# Patient Record
Sex: FEMALE | Race: OTHER | HISPANIC OR LATINO | Employment: FULL TIME | URBAN - METROPOLITAN AREA
[De-identification: names, ages, dates, MRNs, and addresses within clinical notes are randomized per-mention and may not be internally consistent; named-entity substitution may affect disease eponyms.]

---

## 2017-03-02 ENCOUNTER — HISTORICAL (OUTPATIENT)
Dept: ADMINISTRATIVE | Facility: HOSPITAL | Age: 37
End: 2017-03-02

## 2017-03-15 ENCOUNTER — HISTORICAL (OUTPATIENT)
Dept: RADIOLOGY | Facility: HOSPITAL | Age: 37
End: 2017-03-15

## 2017-09-11 ENCOUNTER — HISTORICAL (OUTPATIENT)
Dept: ADMINISTRATIVE | Facility: HOSPITAL | Age: 37
End: 2017-09-11

## 2018-04-27 ENCOUNTER — HISTORICAL (OUTPATIENT)
Dept: ADMINISTRATIVE | Facility: HOSPITAL | Age: 38
End: 2018-04-27

## 2018-04-27 LAB
CHOLEST SERPL-MCNC: 133 MG/DL (ref 0–200)
CHOLEST/HDLC SERPL: 2.5 {RATIO} (ref 0–4)
HDLC SERPL-MCNC: 53 MG/DL (ref 35–60)
LDLC SERPL CALC-MCNC: 44 MG/DL (ref 0–129)
TRIGL SERPL-MCNC: 178 MG/DL (ref 30–150)
VLDLC SERPL CALC-MCNC: 36 MG/DL

## 2018-06-22 ENCOUNTER — HISTORICAL (OUTPATIENT)
Dept: ADMINISTRATIVE | Facility: HOSPITAL | Age: 38
End: 2018-06-22

## 2018-06-22 LAB
ABS NEUT (OLG): 5.45 X10(3)/MCL (ref 2.1–9.2)
ALBUMIN SERPL-MCNC: 3.4 GM/DL (ref 3.4–5)
ALBUMIN/GLOB SERPL: 1 RATIO (ref 1.1–2)
ALP SERPL-CCNC: 75 UNIT/L (ref 38–126)
ALT SERPL-CCNC: 26 UNIT/L (ref 12–78)
AST SERPL-CCNC: 15 UNIT/L (ref 15–37)
BASOPHILS # BLD AUTO: 0 X10(3)/MCL (ref 0–0.2)
BASOPHILS NFR BLD AUTO: 0 %
BILIRUB SERPL-MCNC: 0.3 MG/DL (ref 0.2–1)
BILIRUBIN DIRECT+TOT PNL SERPL-MCNC: 0.1 MG/DL (ref 0–0.5)
BILIRUBIN DIRECT+TOT PNL SERPL-MCNC: 0.2 MG/DL (ref 0–0.8)
BUN SERPL-MCNC: 10 MG/DL (ref 7–18)
CALCIUM SERPL-MCNC: 8.7 MG/DL (ref 8.5–10.1)
CHLORIDE SERPL-SCNC: 105 MMOL/L (ref 98–107)
CO2 SERPL-SCNC: 28 MMOL/L (ref 21–32)
CREAT SERPL-MCNC: 0.88 MG/DL (ref 0.55–1.02)
EOSINOPHIL # BLD AUTO: 0.2 X10(3)/MCL (ref 0–0.9)
EOSINOPHIL NFR BLD AUTO: 2 %
ERYTHROCYTE [DISTWIDTH] IN BLOOD BY AUTOMATED COUNT: 12.5 % (ref 11.5–17)
GLOBULIN SER-MCNC: 3.5 GM/DL (ref 2.4–3.5)
GLUCOSE SERPL-MCNC: 135 MG/DL (ref 74–106)
HCT VFR BLD AUTO: 42.2 % (ref 37–47)
HGB BLD-MCNC: 14.2 GM/DL (ref 12–16)
LYMPHOCYTES # BLD AUTO: 2.7 X10(3)/MCL (ref 0.6–4.6)
LYMPHOCYTES NFR BLD AUTO: 29 %
MCH RBC QN AUTO: 29.7 PG (ref 27–31)
MCHC RBC AUTO-ENTMCNC: 33.6 GM/DL (ref 33–36)
MCV RBC AUTO: 88.3 FL (ref 80–94)
MONOCYTES # BLD AUTO: 0.8 X10(3)/MCL (ref 0.1–1.3)
MONOCYTES NFR BLD AUTO: 9 %
NEUTROPHILS # BLD AUTO: 5.45 X10(3)/MCL (ref 2.1–9.2)
NEUTROPHILS NFR BLD AUTO: 59 %
PLATELET # BLD AUTO: 314 X10(3)/MCL (ref 130–400)
PMV BLD AUTO: 10.2 FL (ref 9.4–12.4)
POTASSIUM SERPL-SCNC: 4 MMOL/L (ref 3.5–5.1)
PROT SERPL-MCNC: 6.9 GM/DL (ref 6.4–8.2)
RBC # BLD AUTO: 4.78 X10(6)/MCL (ref 4.2–5.4)
SODIUM SERPL-SCNC: 140 MMOL/L (ref 136–145)
TSH SERPL-ACNC: 2.12 MIU/L (ref 0.36–3.74)
WBC # SPEC AUTO: 9.2 X10(3)/MCL (ref 4.5–11.5)

## 2018-09-11 ENCOUNTER — HISTORICAL (OUTPATIENT)
Dept: ADMINISTRATIVE | Facility: HOSPITAL | Age: 38
End: 2018-09-11

## 2019-08-02 ENCOUNTER — HISTORICAL (OUTPATIENT)
Dept: RADIOLOGY | Facility: HOSPITAL | Age: 39
End: 2019-08-02

## 2019-08-02 ENCOUNTER — HISTORICAL (OUTPATIENT)
Dept: ADMINISTRATIVE | Facility: HOSPITAL | Age: 39
End: 2019-08-02

## 2019-08-02 LAB
ABS NEUT (OLG): 3.97 X10(3)/MCL (ref 2.1–9.2)
ALBUMIN SERPL-MCNC: 3.4 GM/DL (ref 3.4–5)
ALBUMIN/GLOB SERPL: 1 {RATIO}
ALP SERPL-CCNC: 75 UNIT/L (ref 38–126)
ALT SERPL-CCNC: 23 UNIT/L (ref 12–78)
APPEARANCE, UA: CLEAR
AST SERPL-CCNC: 14 UNIT/L (ref 15–37)
BACTERIA SPEC CULT: NORMAL /HPF
BASOPHILS # BLD AUTO: 0 X10(3)/MCL (ref 0–0.2)
BASOPHILS NFR BLD AUTO: 1 %
BILIRUB SERPL-MCNC: 0.4 MG/DL (ref 0.2–1)
BILIRUB UR QL STRIP: NEGATIVE
BILIRUBIN DIRECT+TOT PNL SERPL-MCNC: 0.1 MG/DL (ref 0–0.2)
BILIRUBIN DIRECT+TOT PNL SERPL-MCNC: 0.3 MG/DL (ref 0–0.8)
BUN SERPL-MCNC: 9 MG/DL (ref 7–18)
CALCIUM SERPL-MCNC: 9.1 MG/DL (ref 8.5–10.1)
CHLORIDE SERPL-SCNC: 103 MMOL/L (ref 98–107)
CHOLEST SERPL-MCNC: 168 MG/DL (ref 0–200)
CHOLEST/HDLC SERPL: 3.2 {RATIO} (ref 0–4)
CO2 SERPL-SCNC: 27 MMOL/L (ref 21–32)
COLOR UR: YELLOW
CREAT SERPL-MCNC: 0.84 MG/DL (ref 0.55–1.02)
EOSINOPHIL # BLD AUTO: 0.1 X10(3)/MCL (ref 0–0.9)
EOSINOPHIL NFR BLD AUTO: 2 %
ERYTHROCYTE [DISTWIDTH] IN BLOOD BY AUTOMATED COUNT: 13.1 % (ref 11.5–17)
EST. AVERAGE GLUCOSE BLD GHB EST-MCNC: 111 MG/DL
GLOBULIN SER-MCNC: 3.4 GM/DL (ref 2.4–3.5)
GLUCOSE (UA): NEGATIVE
GLUCOSE SERPL-MCNC: 87 MG/DL (ref 74–106)
HBA1C MFR BLD: 5.5 % (ref 4.2–6.3)
HCT VFR BLD AUTO: 42.9 % (ref 37–47)
HDLC SERPL-MCNC: 53 MG/DL (ref 35–60)
HGB BLD-MCNC: 14.4 GM/DL (ref 12–16)
HGB UR QL STRIP: NEGATIVE
KETONES UR QL STRIP: NEGATIVE
LDLC SERPL CALC-MCNC: 62 MG/DL (ref 0–129)
LEUKOCYTE ESTERASE UR QL STRIP: NEGATIVE
LYMPHOCYTES # BLD AUTO: 2 X10(3)/MCL (ref 0.6–4.6)
LYMPHOCYTES NFR BLD AUTO: 29 %
MCH RBC QN AUTO: 29.5 PG (ref 27–31)
MCHC RBC AUTO-ENTMCNC: 33.6 GM/DL (ref 33–36)
MCV RBC AUTO: 87.9 FL (ref 80–94)
MONOCYTES # BLD AUTO: 0.7 X10(3)/MCL (ref 0.1–1.3)
MONOCYTES NFR BLD AUTO: 10 %
NEUTROPHILS # BLD AUTO: 3.97 X10(3)/MCL (ref 2.1–9.2)
NEUTROPHILS NFR BLD AUTO: 57 %
NITRITE UR QL STRIP: NEGATIVE
NRBC BLD AUTO-RTO: 0 % (ref 0–0.2)
PH UR STRIP: 5 [PH] (ref 5–9)
PLATELET # BLD AUTO: 373 X10(3)/MCL (ref 130–400)
PMV BLD AUTO: 10.1 FL (ref 9.4–12.4)
POTASSIUM SERPL-SCNC: 4.6 MMOL/L (ref 3.5–5.1)
PROT SERPL-MCNC: 6.8 GM/DL (ref 6.4–8.2)
PROT UR QL STRIP: NEGATIVE
RBC # BLD AUTO: 4.88 X10(6)/MCL (ref 4.2–5.4)
RBC #/AREA URNS HPF: NORMAL /[HPF]
SODIUM SERPL-SCNC: 138 MMOL/L (ref 136–145)
SP GR UR STRIP: 1.02 (ref 1–1.03)
SQUAMOUS EPITHELIAL, UA: NORMAL
TRIGL SERPL-MCNC: 263 MG/DL (ref 30–150)
TSH SERPL-ACNC: 1.99 MIU/L (ref 0.36–3.74)
UROBILINOGEN UR STRIP-ACNC: 0.2
VLDLC SERPL CALC-MCNC: 53 MG/DL
WBC # SPEC AUTO: 6.9 X10(3)/MCL (ref 4.5–11.5)
WBC #/AREA URNS HPF: NORMAL /[HPF]

## 2020-01-02 ENCOUNTER — HISTORICAL (OUTPATIENT)
Dept: RADIOLOGY | Facility: HOSPITAL | Age: 40
End: 2020-01-02

## 2020-01-20 ENCOUNTER — HISTORICAL (OUTPATIENT)
Dept: RADIOLOGY | Facility: HOSPITAL | Age: 40
End: 2020-01-20

## 2020-01-29 ENCOUNTER — HISTORICAL (OUTPATIENT)
Dept: ADMINISTRATIVE | Facility: HOSPITAL | Age: 40
End: 2020-01-29

## 2020-01-29 LAB
BUN SERPL-MCNC: 9 MG/DL (ref 7–18)
CALCIUM SERPL-MCNC: 8.8 MG/DL (ref 8.5–10.1)
CHLORIDE SERPL-SCNC: 106 MMOL/L (ref 98–107)
CHOLEST SERPL-MCNC: 198 MG/DL (ref 0–200)
CHOLEST/HDLC SERPL: 3.8 {RATIO} (ref 0–4)
CO2 SERPL-SCNC: 29 MMOL/L (ref 21–32)
CREAT SERPL-MCNC: 0.76 MG/DL (ref 0.55–1.02)
CREAT/UREA NIT SERPL: 11.8
GLUCOSE SERPL-MCNC: 88 MG/DL (ref 74–106)
HDLC SERPL-MCNC: 52 MG/DL (ref 35–60)
LDLC SERPL CALC-MCNC: 107 MG/DL (ref 0–129)
POTASSIUM SERPL-SCNC: 4.7 MMOL/L (ref 3.5–5.1)
SODIUM SERPL-SCNC: 143 MMOL/L (ref 136–145)
TRIGL SERPL-MCNC: 195 MG/DL (ref 30–150)
VLDLC SERPL CALC-MCNC: 39 MG/DL

## 2020-06-12 ENCOUNTER — HISTORICAL (OUTPATIENT)
Dept: ADMINISTRATIVE | Facility: HOSPITAL | Age: 40
End: 2020-06-12

## 2020-06-12 LAB
ALBUMIN SERPL-MCNC: 3.5 GM/DL (ref 3.5–5)
ALP SERPL-CCNC: 72 UNIT/L (ref 40–150)
ALT SERPL-CCNC: 14 UNIT/L (ref 0–55)
AMYLASE SERPL-CCNC: 80 UNIT/L (ref 25–125)
AST SERPL-CCNC: 14 UNIT/L (ref 5–34)
BILIRUB SERPL-MCNC: 0.3 MG/DL
BILIRUBIN DIRECT+TOT PNL SERPL-MCNC: 0.1 MG/DL (ref 0–0.5)
BILIRUBIN DIRECT+TOT PNL SERPL-MCNC: 0.2 MG/DL (ref 0–0.8)
LIPASE SERPL-CCNC: 29 U/L
LIVER PROFILE INTERP: NORMAL
PROT SERPL-MCNC: 6.7 GM/DL (ref 6.4–8.3)

## 2021-02-25 ENCOUNTER — HISTORICAL (OUTPATIENT)
Dept: ADMINISTRATIVE | Facility: HOSPITAL | Age: 41
End: 2021-02-25

## 2021-02-25 LAB
ALBUMIN SERPL-MCNC: 3.8 GM/DL (ref 3.5–5)
ALBUMIN/GLOB SERPL: 1.3 RATIO (ref 1.1–2)
ALP SERPL-CCNC: 62 UNIT/L (ref 40–150)
ALT SERPL-CCNC: 18 UNIT/L (ref 0–55)
AST SERPL-CCNC: 19 UNIT/L (ref 5–34)
BILIRUB SERPL-MCNC: 0.5 MG/DL
BILIRUBIN DIRECT+TOT PNL SERPL-MCNC: 0.2 MG/DL (ref 0–0.5)
BILIRUBIN DIRECT+TOT PNL SERPL-MCNC: 0.3 MG/DL (ref 0–0.8)
BUN SERPL-MCNC: 8.5 MG/DL (ref 7–18.7)
CALCIUM SERPL-MCNC: 8.6 MG/DL (ref 8.4–10.2)
CHLORIDE SERPL-SCNC: 105 MMOL/L (ref 98–107)
CHOLEST SERPL-MCNC: 150 MG/DL
CHOLEST/HDLC SERPL: 3 {RATIO} (ref 0–5)
CO2 SERPL-SCNC: 24 MMOL/L (ref 22–29)
CREAT SERPL-MCNC: 0.8 MG/DL (ref 0.55–1.02)
GLOBULIN SER-MCNC: 3 GM/DL (ref 2.4–3.5)
GLUCOSE SERPL-MCNC: 79 MG/DL (ref 74–100)
HDLC SERPL-MCNC: 47 MG/DL (ref 35–60)
LDLC SERPL CALC-MCNC: 52 MG/DL (ref 50–140)
POTASSIUM SERPL-SCNC: 4.4 MMOL/L (ref 3.5–5.1)
PROT SERPL-MCNC: 6.8 GM/DL (ref 6.4–8.3)
SODIUM SERPL-SCNC: 139 MMOL/L (ref 136–145)
TRIGL SERPL-MCNC: 253 MG/DL (ref 37–140)
VLDLC SERPL CALC-MCNC: 51 MG/DL

## 2022-02-21 ENCOUNTER — HISTORICAL (OUTPATIENT)
Dept: ADMINISTRATIVE | Facility: HOSPITAL | Age: 42
End: 2022-02-21

## 2022-02-21 LAB
ALBUMIN SERPL-MCNC: 3.9 G/DL (ref 3.5–5)
ALBUMIN/GLOB SERPL: 1.1 {RATIO} (ref 1.1–2)
ALP SERPL-CCNC: 65 U/L (ref 40–150)
ALT SERPL-CCNC: 25 U/L (ref 0–55)
AST SERPL-CCNC: 24 U/L (ref 5–34)
BILIRUB SERPL-MCNC: 0.6 MG/DL
BILIRUBIN DIRECT+TOT PNL SERPL-MCNC: 0.3 (ref 0–0.5)
BILIRUBIN DIRECT+TOT PNL SERPL-MCNC: 0.3 (ref 0–0.8)
BUN SERPL-MCNC: 8.9 MG/DL (ref 7–18.7)
CALCIUM SERPL-MCNC: 9.6 MG/DL (ref 8.7–10.5)
CHLORIDE SERPL-SCNC: 103 MMOL/L (ref 98–107)
CHOLEST SERPL-MCNC: 177 MG/DL
CHOLEST/HDLC SERPL: 3 {RATIO} (ref 0–5)
CO2 SERPL-SCNC: 26 MMOL/L (ref 22–29)
CREAT SERPL-MCNC: 0.77 MG/DL (ref 0.55–1.02)
GLOBULIN SER-MCNC: 3.4 G/DL (ref 2.4–3.5)
GLUCOSE SERPL-MCNC: 76 MG/DL (ref 74–100)
HDLC SERPL-MCNC: 56 MG/DL (ref 35–60)
HEMOLYSIS INTERF INDEX SERPL-ACNC: 13
ICTERIC INTERF INDEX SERPL-ACNC: 1
LDLC SERPL CALC-MCNC: 90 MG/DL (ref 50–140)
LIPEMIC INTERF INDEX SERPL-ACNC: 14
POTASSIUM SERPL-SCNC: 4.3 MMOL/L (ref 3.5–5.1)
PROT SERPL-MCNC: 7.3 G/DL (ref 6.4–8.3)
SODIUM SERPL-SCNC: 139 MMOL/L (ref 136–145)
TRIGL SERPL-MCNC: 156 MG/DL (ref 37–140)
VLDLC SERPL CALC-MCNC: 31 MG/DL

## 2022-03-09 ENCOUNTER — HISTORICAL (OUTPATIENT)
Dept: RADIOLOGY | Facility: HOSPITAL | Age: 42
End: 2022-03-09

## 2022-03-09 ENCOUNTER — HISTORICAL (OUTPATIENT)
Dept: ADMINISTRATIVE | Facility: HOSPITAL | Age: 42
End: 2022-03-09

## 2022-03-29 LAB — PAP RECOMMENDATION EXT: NORMAL

## 2022-04-11 ENCOUNTER — HISTORICAL (OUTPATIENT)
Dept: ADMINISTRATIVE | Facility: HOSPITAL | Age: 42
End: 2022-04-11
Payer: COMMERCIAL

## 2022-04-14 ENCOUNTER — HISTORICAL (OUTPATIENT)
Dept: ADMINISTRATIVE | Facility: HOSPITAL | Age: 42
End: 2022-04-14

## 2022-04-14 ENCOUNTER — HISTORICAL (OUTPATIENT)
Dept: RADIOLOGY | Facility: HOSPITAL | Age: 42
End: 2022-04-14
Payer: COMMERCIAL

## 2022-04-24 VITALS
BODY MASS INDEX: 33.23 KG/M2 | HEIGHT: 61 IN | DIASTOLIC BLOOD PRESSURE: 70 MMHG | WEIGHT: 176 LBS | SYSTOLIC BLOOD PRESSURE: 132 MMHG | OXYGEN SATURATION: 97 %

## 2022-05-10 ENCOUNTER — HOSPITAL ENCOUNTER (OUTPATIENT)
Dept: RADIOLOGY | Facility: HOSPITAL | Age: 42
Discharge: HOME | End: 2022-05-10
Attending: NURSE PRACTITIONER
Payer: COMMERCIAL

## 2022-05-10 DIAGNOSIS — R92.8 ABNORMAL MAMMOGRAM: ICD-10-CM

## 2022-05-10 DIAGNOSIS — R92.8 ABNORMAL MAMMOGRAM: Primary | ICD-10-CM

## 2022-05-10 PROCEDURE — 19083 BX BREAST 1ST LESION US IMAG: CPT | Mod: RT,,, | Performed by: RADIOLOGY

## 2022-05-10 PROCEDURE — 77065 DX MAMMO INCL CAD UNI: CPT | Mod: TC,RT

## 2022-05-10 PROCEDURE — 19083 US BREAST BIOPSY WITH IMAGING 1ST SITE RIGHT: ICD-10-PCS | Mod: RT,,, | Performed by: RADIOLOGY

## 2022-05-10 PROCEDURE — 19083 BX BREAST 1ST LESION US IMAG: CPT | Mod: RT

## 2022-05-10 PROCEDURE — 77065 DX MAMMO INCL CAD UNI: CPT | Mod: 26,RT,, | Performed by: RADIOLOGY

## 2022-05-10 PROCEDURE — 77061 MAMMO DIGITAL DIAGNOSTIC RIGHT WITH TOMO: ICD-10-PCS | Mod: 26,RT,, | Performed by: RADIOLOGY

## 2022-05-10 PROCEDURE — 27000549 US BREAST BIOPSY WITH IMAGING 1ST SITE RIGHT

## 2022-05-10 PROCEDURE — 77065 MAMMO DIGITAL DIAGNOSTIC RIGHT WITH TOMO: ICD-10-PCS | Mod: 26,RT,, | Performed by: RADIOLOGY

## 2022-05-10 PROCEDURE — 77061 BREAST TOMOSYNTHESIS UNI: CPT | Mod: 26,RT,, | Performed by: RADIOLOGY

## 2022-05-31 LAB
ESTRIOL SERPL-MCNC: NORMAL NG/ML
ESTROGEN SERPL-MCNC: NORMAL PG/ML
INSULIN SERPL-ACNC: NORMAL U[IU]/ML
LAB AP CLINICAL INFORMATION: NORMAL
LAB AP GROSS DESCRIPTION: NORMAL
LAB AP REPORT FOOTNOTES: NORMAL
T3RU NFR SERPL: NORMAL %

## 2022-06-01 ENCOUNTER — TELEPHONE (OUTPATIENT)
Dept: SURGERY | Facility: CLINIC | Age: 42
End: 2022-06-01
Payer: COMMERCIAL

## 2022-06-01 NOTE — PROGRESS NOTES
[facility] - Breast [place] [facility]  Breast Surgical Oncology  New Patient Office Visit - H&P      Referring Provider: No ref. provider found   PCP: Aliya Winslow MD     Chief Complaint:   Chief Complaint   Patient presents with    Breast Cancer     Right Invasive ductal Ca        Subjective:     HPI:  Rob Dennison is a 42 y.o. female who presents on 2022 for evaluation of newly diagnosed right  breast cancer.    A detailed patient history was obtained and reviewed. She currently denies any breast issues including rashes, redness, pain, swelling, nipple discharge, or new lumps/masses.    MG breast density: Category C ( Heterogeneously dense)     Imagin. 3/9/2022 BL SC MG at [facility]- which revealed a a focal asymmetry in the right breast central to the nipple, posterior depth.    No other significant masses, calcifications, or other findings are seen in either breast.  BIRADS-0; additional imaging needed.    2. 2022 R DG MG/ R US BREAST LIMITED at [facility]- which revealed on R MG at central region posterior depth focal asymmetry recalled from screening mammography. On today's additional mammographic views, there are three adjacent oval equal density masses with indistinct margins located in the central region far posterior depth. The most posterior of these three masses correlates with the focal asymmetry recalled from screening. No other significant mammographic finding.  On R US, at 7 o'clock to 9 o'clock and subareolar region revealed three adjacent oval hypoechoic masses with indistinct margins at the 9 o'clock  subareolar position, correlating with the three adjacent oval equal density masses with indistinct margins seen on today's mammogram. These three masses measure 0.5 x 0.5 x 0.4 cm, 0.9 x 0.9 x 0.8 cm, and 0.8 x 0.4 x 0.5 cm.   Incidentally seen at 7 o'clock to 8 o'clock  are several subcentimeter cysts of varying size and complication. These are benign. No suspicious right  axillary adenopathy. BIRADS-4 suspicious; biopsy recommended.       Pathology:  1. 2022 Ultrasound-guided Core Needle Biopsy Right Breast Mass 9 o'clock Subareolar-  -Invasive ductal carcinoma, grade 3 (measuring 5.0mm)  ER %  AL 05  HER2 Negative  Ki67 63%    OB/GYN History:  Age at Menarche Onset: 10  Menopausal Status: premenopausal, LMP: 2022   Hysterectomy/Oophorectomy: no,n/a  Hormonal birth control (duration): Yes OCP (estrogen/progesterone).   Pregnancy History:   Age at first live birth: 1  Hormone Replacement Therapy: No, none  Patient admits to nipple discharge. Described as bilateral and clear after having first mammogram which has resolved   Patient denies to previous breast biopsy.   Patient denies to a personal history of breast cancer.    Other Relevant History:  Prior thoracic RT: none  Genetic testing: no  Ashkenazi Druze descent: No    Family History:  Father - Skin cancer possible melanoma at age 80  Paternal Grandfather - colon cancer at age 70    Past History:  Past Medical History:   Diagnosis Date    Anxiety     Depression     Hypertension         Past Surgical History:   Procedure Laterality Date    BACK SURGERY      CHOLECYSTECTOMY      ENDOSCOPIC RELEASE OF BOTH CARPAL TUNNELS Bilateral     TRIGGER FINGER RELEASE Right         Social History     Socioeconomic History    Marital status: Unknown   Tobacco Use    Smoking status: Never Smoker    Smokeless tobacco: Never Used   Substance and Sexual Activity    Alcohol use: Not Currently     Comment: Occasionally        Body mass index is 32.8 kg/m².     Allergy/Medications:   Review of patient's allergies indicates:  No Known Allergies       Current Outpatient Medications:     acyclovir (ZOVIRAX) 400 MG tablet, Take 400 mg by mouth once daily., Disp: , Rfl:     cyanocobalamin 2000 MCG tablet, Take 2,000 mcg by mouth once daily., Disp: , Rfl:     DULoxetine (CYMBALTA) 60 MG capsule, Take 120 mg by mouth once daily.,  Disp: , Rfl:     DULoxetine (CYMBALTA) 60 MG capsule,  See Instructions, TAKE 1 CAPSULE BY MOUTH DAILY, # 90 cap(s), 1 Refill(s), Pharmacy: [id number], 155, cm, Height/Length Dosing, 02/22/21 13:16:00 CST, 79.83, kg, Weight Dosing, 02/22/21 13:16:00 CST, Disp: , Rfl:     fluticasone propionate (FLONASE) 50 mcg/actuation nasal spray, 1 spray by Each Nostril route once daily., Disp: , Rfl:     hyoscyamine (LEVSIN/SL) 0.125 mg Subl, DISSOLVE 1 TABLET UNDER THE TONGUE EVERY 6 HOURS AS NEEDED FOR CRAMPING OR DIARRHEA, Disp: , Rfl:     lisinopriL 10 MG tablet, Take 10 mg by mouth once daily., Disp: , Rfl:     loratadine (CLARITIN) 10 mg tablet, Take 10 mg by mouth once daily., Disp: , Rfl:     multivitamin (ONE DAILY MULTIVITAMIN) per tablet, Take 1 tablet by mouth once daily., Disp: , Rfl:     ondansetron (ZOFRAN-ODT) 8 MG TbDL, DISSOLVE ONE TABLET BY MOUTH EVERY 8 HOURS AS NEEDED FORNAUSEA AND VOMITING, Disp: , Rfl:     pyridoxine, vitamin B6, (VITAMIN B-6) 100 MG Tab, Take 50 mg by mouth once daily., Disp: , Rfl:     spironolactone (ALDACTONE) 100 MG tablet, Take 100 mg by mouth every morning., Disp: , Rfl:     VIENVA 0.1-20 mg-mcg per tablet, Take 1 tablet by mouth once daily., Disp: , Rfl:     vitamin E 400 UNIT capsule, Take 400 Units by mouth once daily., Disp: , Rfl:        Review of Systems:  Constitutional: denies fevers, chills, weight loss  HEENT: denies blurry/double vision, changes in hearing, odynophagia, dysphagia  Respiratory: denies cough, shortness of breath  Cardiovascular: denies palpitations, swelling of the extremities  GI: denies abdominal pain, nausea/vomiting, hematochezia, frequent stools  : denies frequency, dysuria, flank pain, hematuria  Skin: denies new rashes  Neurological: denies muscular/sensory deficiencies, loss of coordination, headaches, memory changes  Endo: denies hair loss/thinning, nervousness, hot flashes, heat/cold intolerance, lumps in the neck  "area  Heme: denies easy bruising and fatigue  Psychological: denies anxious/depressive moods  Musculoskeletal: denies bony pain, muscle cramps, swollen joints    -Right Shoulder pain- when lying down at night (sharp, deep) less than a year; pain has gotten worse over the year  -Left great toe pain- started a week ago  -Shortness of breath- stress related, anxiety  -Back pain-has had seen since 2018- Herniated disc(surgery, injections)  -Anxiety/ Depression(in the past)  -Delayed healing and bruises easily      Objective:     Vitals:  Blood pressure 108/72, pulse 91, temperature 97.9 °F (36.6 °C), resp. rate 18, height 5' 1" (1.549 m), weight 78.7 kg (173 lb 9.6 oz), last menstrual period 05/25/2022, SpO2 98 %.      Physical Exam:  General: The patient is awake, alert and oriented times three. The patient is well nourished and in no acute distress.   Neck: There is no evidence of palpable cervical, supraclavicular or axillary adenopathy. The neck is supple. The thyroid is not enlarged.   Musculoskeletal: The patient has a normal range of motion of her bilateral upper extremities.   Chest: Examination of the chest wall fails to reveal any obvious abnormalities. The lungs are clear to auscultation bilaterally without rales, rhonchi, or wheezing.   Cardiovascular: The heart has a regular rate and rhythm without murmurs, gallops or rubs.  Breast:  Right: Examination of right breast fails to reveal any dominant masses or areas of significant focal nodularity. The nipple is everted without evidence of discharge. There is no skin dimpling with movement of the pectoralis. There are no significant skin changes overlying the breast.   Left: Examination of the left breast fails to reveal any dominant masses or areas of significant focal nodularity. The nipple is everted without evidence of discharge. There is no skin dimpling with movement of the pectoralis. There are no significant skin changes overlying the breast.  Abdomen: " The abdomen is soft, flat, nontender and nondistended with no palpable masses or organomegaly.  Integumentary: no rashes or skin lesions present  Neurologic: cranial nerves intact, no signs of peripheral neurological deficit, motor/sensory function intact    Assessment and Discussion:     Cancer Staging  Malignant neoplasm of central portion of right breast in female, estrogen receptor negative  Staging form: Breast, AJCC 8th Edition  - Clinical stage from 6/2/2022: Stage IB (cT1b(3), cN0, cM0, G3, ER-, IN-, HER2-) - Signed by Genie Josue MD on 6/2/2022        Encounter Diagnoses   Name Primary?    Malignant neoplasm of central portion of right breast in female, estrogen receptor negative Yes         We discussed the need to proceed with local and systemic treatment. Local treatment options are surgery and radiation. The choices for surgical operations include mastectomy and lumpectomy with radiation. The general operative procedure of mastectomy, the skin sparing nature and attempts made to save underlying muscle with modified mastectomy were discussed. The options of primary reconstruction following mastectomy include either implant reconstruction or tissue transfer type of reconstruction. The pros and cons of both of these reconstructive methods were addressed. Both of these are performed in stages and second operation is usually required before the final completion of the reconstructive process. I also explained to the patient that the reconstructive options are generally by law required to be covered by insurance and would be considered part of a cancer operation and not a cosmetic operation. Sometimes also a reduction or mastopexy is performed on the opposite side for better match, and this operation by itself is also considered part of the cancer treatment.     Following explanation of the mastectomy option, I then explained to her the procedure of lumpectomy. The rationale for lumpectomy, the need to  obtain clear margins was specifically addressed. The absolute necessity of adding radiation following lumpectomy with good margins was explained. The logistics of radiation were discussed at length. The patient will further discuss details of radiation with her Radiation Oncologist.     I informed her of the complications which include surgical site infections, hematoma or seroma requiring operation, necrosis of nipple-areola and/or mastectomy flaps requiring debridement or hyperbaric therapy, unplanned re-operations, and delay in adjuvant treatments.     Following this, I also explained to her the procedure of sentinel lymph node mapping and its rationale. We have a 98% success rate identifying the sentinel node. The need to use radioactive dye and blue dye to ensure proper identification of the node was explained. MagTrace brown dye can also be used in the setting of a mastectomy. Also the small but real risk of anaphylactic shock with blue dye or MagTrace dye was discussed. The need to perform completion dissection should the sentinel lymph node be positive was also explained to the patient.     Following this, I have also briefly discussed with her the rationale for adjuvant systemic treatment in the form of either chemotherapy and/or hormonal therapy. This would depend on the presence of hormone receptors in the tumor. Further recommendations regarding the kind of chemo and the cycles would be finalized by Medical Oncology. She will discuss these issues at length with her medical oncologist. Specifically, we went over the local recurrence rate and survival rates with mastectomy and breast conserving therapy, the small but real risk of lymphedema should we need completion dissection, and the indications for post mastectomy radiation in certain subset of patients.       Plan:       1. Surgery - TBD  2. Recommend BL Breast MRI - re: newly diagnosed breast cancer, elevated risk, and evaluation of extent of  disease  3. Recommend Genetic testing - re: newly diagnosed breast cancer at age 42  4. Recommend Right shoulder X-ray-2/views - re: new onset right shoulder pain. Also recommend referral to Ortho - Dr. Jose A Rojas  5. Referral to Medical Oncology - she would like to be seen in [place] if possible will coordinate  6. Mediport placement and chemotherapy were also discussed      All of questions were answered    Genie Josue MD  Breast Surgical Oncology     OFFICE VISIT CODING:    Non-face-to-face time included:  Yes Preparing to see the patient such as reviewing the patient record  Yes Obtaining and reviewing separately obtained history  Yes Independently interpreting results  Yes Documenting clinical information in electronic health record  No Ordering appropriate medications  Yes Ordering appropriate tests  Yes Ordering appropriate procedures (including follow-up)  No Referring and communicating with other health care professionals (not separately reported)  Yes Care Coordination (not separately reported)    Face-to-face time included:  Yes Performing a medically necessary appropriate history, examination, and/or evaluation  Yes Communicating results to the patient/family/caregiver  Yes Counseling and educating the patient/family/caregiver  Yes Answering patient/family/caregiver questions    Total Time: 65 minutes    Total time includes both face-to-face and non-face-to-face time personally spent by myself on the day of the visit.

## 2022-06-02 ENCOUNTER — OFFICE VISIT (OUTPATIENT)
Dept: SURGERY | Facility: CLINIC | Age: 42
End: 2022-06-02
Payer: COMMERCIAL

## 2022-06-02 VITALS
TEMPERATURE: 98 F | SYSTOLIC BLOOD PRESSURE: 108 MMHG | DIASTOLIC BLOOD PRESSURE: 72 MMHG | HEIGHT: 61 IN | BODY MASS INDEX: 32.78 KG/M2 | HEART RATE: 91 BPM | WEIGHT: 173.63 LBS | OXYGEN SATURATION: 98 % | RESPIRATION RATE: 18 BRPM

## 2022-06-02 DIAGNOSIS — Z17.1 MALIGNANT NEOPLASM OF CENTRAL PORTION OF RIGHT BREAST IN FEMALE, ESTROGEN RECEPTOR NEGATIVE: Primary | ICD-10-CM

## 2022-06-02 DIAGNOSIS — M25.511 ACUTE PAIN OF RIGHT SHOULDER: ICD-10-CM

## 2022-06-02 DIAGNOSIS — C50.111 MALIGNANT NEOPLASM OF CENTRAL PORTION OF RIGHT BREAST IN FEMALE, ESTROGEN RECEPTOR NEGATIVE: Primary | ICD-10-CM

## 2022-06-02 PROCEDURE — 1160F RVW MEDS BY RX/DR IN RCRD: CPT | Mod: CPTII,S$GLB,, | Performed by: SURGERY

## 2022-06-02 PROCEDURE — 99999 PR PBB SHADOW E&M-EST. PATIENT-LVL V: CPT | Mod: PBBFAC,,, | Performed by: SURGERY

## 2022-06-02 PROCEDURE — 99999 PR PBB SHADOW E&M-EST. PATIENT-LVL V: ICD-10-PCS | Mod: PBBFAC,,, | Performed by: SURGERY

## 2022-06-02 PROCEDURE — 3008F BODY MASS INDEX DOCD: CPT | Mod: CPTII,S$GLB,, | Performed by: SURGERY

## 2022-06-02 PROCEDURE — 99205 PR OFFICE/OUTPT VISIT, NEW, LEVL V, 60-74 MIN: ICD-10-PCS | Mod: S$GLB,,, | Performed by: SURGERY

## 2022-06-02 PROCEDURE — 1159F PR MEDICATION LIST DOCUMENTED IN MEDICAL RECORD: ICD-10-PCS | Mod: CPTII,S$GLB,, | Performed by: SURGERY

## 2022-06-02 PROCEDURE — 3074F PR MOST RECENT SYSTOLIC BLOOD PRESSURE < 130 MM HG: ICD-10-PCS | Mod: CPTII,S$GLB,, | Performed by: SURGERY

## 2022-06-02 PROCEDURE — 1159F MED LIST DOCD IN RCRD: CPT | Mod: CPTII,S$GLB,, | Performed by: SURGERY

## 2022-06-02 PROCEDURE — 3078F PR MOST RECENT DIASTOLIC BLOOD PRESSURE < 80 MM HG: ICD-10-PCS | Mod: CPTII,S$GLB,, | Performed by: SURGERY

## 2022-06-02 PROCEDURE — 1160F PR REVIEW ALL MEDS BY PRESCRIBER/CLIN PHARMACIST DOCUMENTED: ICD-10-PCS | Mod: CPTII,S$GLB,, | Performed by: SURGERY

## 2022-06-02 PROCEDURE — 3008F PR BODY MASS INDEX (BMI) DOCUMENTED: ICD-10-PCS | Mod: CPTII,S$GLB,, | Performed by: SURGERY

## 2022-06-02 PROCEDURE — 4010F PR ACE/ARB THEARPY RXD/TAKEN: ICD-10-PCS | Mod: CPTII,S$GLB,, | Performed by: SURGERY

## 2022-06-02 PROCEDURE — 3078F DIAST BP <80 MM HG: CPT | Mod: CPTII,S$GLB,, | Performed by: SURGERY

## 2022-06-02 PROCEDURE — 3074F SYST BP LT 130 MM HG: CPT | Mod: CPTII,S$GLB,, | Performed by: SURGERY

## 2022-06-02 PROCEDURE — 4010F ACE/ARB THERAPY RXD/TAKEN: CPT | Mod: CPTII,S$GLB,, | Performed by: SURGERY

## 2022-06-02 PROCEDURE — 99205 OFFICE O/P NEW HI 60 MIN: CPT | Mod: S$GLB,,, | Performed by: SURGERY

## 2022-06-02 RX ORDER — LISINOPRIL 10 MG/1
10 TABLET ORAL DAILY
COMMUNITY
Start: 2022-04-10 | End: 2023-04-03

## 2022-06-02 RX ORDER — DULOXETIN HYDROCHLORIDE 60 MG/1
120 CAPSULE, DELAYED RELEASE ORAL DAILY
COMMUNITY
Start: 2021-10-25 | End: 2023-03-13

## 2022-06-02 RX ORDER — ONDANSETRON 8 MG/1
8 TABLET, ORALLY DISINTEGRATING ORAL
COMMUNITY
Start: 2022-05-08

## 2022-06-02 RX ORDER — PYRIDOXINE HCL (VITAMIN B6) 100 MG
50 TABLET ORAL DAILY
COMMUNITY

## 2022-06-02 RX ORDER — HYOSCYAMINE SULFATE 0.12 MG/1
TABLET SUBLINGUAL
COMMUNITY
Start: 2022-05-08 | End: 2023-11-16 | Stop reason: ALTCHOICE

## 2022-06-02 RX ORDER — LORATADINE 10 MG/1
10 TABLET ORAL DAILY
COMMUNITY

## 2022-06-02 RX ORDER — TIMOLOL MALEATE 5 MG/ML
1 SOLUTION/ DROPS OPHTHALMIC DAILY
COMMUNITY
Start: 2022-04-24 | End: 2022-06-29

## 2022-06-02 RX ORDER — WITCH HAZEL 50 %
2000 PADS, MEDICATED (EA) TOPICAL DAILY
COMMUNITY

## 2022-06-02 RX ORDER — SPIRONOLACTONE 100 MG/1
100 TABLET, FILM COATED ORAL EVERY MORNING
COMMUNITY
Start: 2022-04-24 | End: 2023-11-16 | Stop reason: ALTCHOICE

## 2022-06-02 RX ORDER — VITAMIN E 268 MG
400 CAPSULE ORAL DAILY
COMMUNITY

## 2022-06-02 RX ORDER — MULTIVITAMIN
1 TABLET ORAL DAILY
COMMUNITY

## 2022-06-02 RX ORDER — FLUTICASONE PROPIONATE 50 MCG
1 SPRAY, SUSPENSION (ML) NASAL DAILY
COMMUNITY

## 2022-06-02 RX ORDER — ACYCLOVIR 400 MG/1
400 TABLET ORAL DAILY
COMMUNITY
Start: 2022-04-24

## 2022-06-03 NOTE — NURSING
Met with patient after her consult with . I informed patient that I refer my patients to [facility] and [facility] to serve as places to obtain any resources she may need during the course of her treatment. She verbalized understanding, and she gave her consent to be referred to these organizations.  The Distress Screening Tool was utilized, and the patient rated her stress as a 10.  She states that she is in the process of moving to [place] and having some personal issues she is dealing with. The educational program, Nurse KARISSA, was viewed by the patient.  The Breast Education binder was given to the patient.  All patient's questions were answered.

## 2022-06-08 ENCOUNTER — HOSPITAL ENCOUNTER (OUTPATIENT)
Dept: RADIOLOGY | Facility: HOSPITAL | Age: 42
Discharge: HOME | End: 2022-06-08
Attending: SURGERY
Payer: COMMERCIAL

## 2022-06-08 ENCOUNTER — LAB VISIT (OUTPATIENT)
Dept: LAB | Facility: HOSPITAL | Age: 42
End: 2022-06-08
Attending: NURSE PRACTITIONER
Payer: COMMERCIAL

## 2022-06-08 ENCOUNTER — OFFICE VISIT (OUTPATIENT)
Dept: HEMATOLOGY/ONCOLOGY | Facility: CLINIC | Age: 42
End: 2022-06-08
Payer: COMMERCIAL

## 2022-06-08 DIAGNOSIS — C50.111 MALIGNANT NEOPLASM OF CENTRAL PORTION OF RIGHT BREAST IN FEMALE, ESTROGEN RECEPTOR NEGATIVE: ICD-10-CM

## 2022-06-08 DIAGNOSIS — M25.511 ACUTE PAIN OF RIGHT SHOULDER: ICD-10-CM

## 2022-06-08 DIAGNOSIS — Z17.1 MALIGNANT NEOPLASM OF CENTRAL PORTION OF RIGHT BREAST IN FEMALE, ESTROGEN RECEPTOR NEGATIVE: ICD-10-CM

## 2022-06-08 PROCEDURE — 1159F MED LIST DOCD IN RCRD: CPT | Mod: CPTII,95,, | Performed by: NURSE PRACTITIONER

## 2022-06-08 PROCEDURE — 99205 OFFICE O/P NEW HI 60 MIN: CPT | Mod: 95,,, | Performed by: NURSE PRACTITIONER

## 2022-06-08 PROCEDURE — 73030 X-RAY EXAM OF SHOULDER: CPT | Mod: TC,RT

## 2022-06-08 PROCEDURE — 36415 COLL VENOUS BLD VENIPUNCTURE: CPT

## 2022-06-08 PROCEDURE — 1159F PR MEDICATION LIST DOCUMENTED IN MEDICAL RECORD: ICD-10-PCS | Mod: CPTII,95,, | Performed by: NURSE PRACTITIONER

## 2022-06-08 PROCEDURE — 4010F PR ACE/ARB THEARPY RXD/TAKEN: ICD-10-PCS | Mod: CPTII,95,, | Performed by: NURSE PRACTITIONER

## 2022-06-08 PROCEDURE — 4010F ACE/ARB THERAPY RXD/TAKEN: CPT | Mod: CPTII,95,, | Performed by: NURSE PRACTITIONER

## 2022-06-08 PROCEDURE — 99205 PR OFFICE/OUTPT VISIT, NEW, LEVL V, 60-74 MIN: ICD-10-PCS | Mod: 95,,, | Performed by: NURSE PRACTITIONER

## 2022-06-08 NOTE — PROGRESS NOTES
REFERRING PHYSICIAN: Dr. Genie Josue    Subjective:       Patient ID: Rob Dennison is a 42 y.o. female.    Chief Complaint: Personal history of recently diagnosed triple negative breast cancer and a family history of cancer. Patient was referred by Dr. Josue to assist with surgical decision-making. Patient presented via Telemedicine Visit (audio and video) today for risk assessment, genetic counseling, and consideration for genetic testing.    ALLERGIES:  Patient has no known allergies.    HPI  Past Medical History:   Diagnosis Date    Anxiety     Depression     Hypertension      Past Surgical History:   Procedure Laterality Date    BACK SURGERY      CHOLECYSTECTOMY      ENDOSCOPIC RELEASE OF BOTH CARPAL TUNNELS Bilateral     TRIGGER FINGER RELEASE Right        Oncology History   Malignant neoplasm of central portion of right breast in female, estrogen receptor negative         Assessment:       Family History   Problem Relation Age of Onset    Hypertension Mother     Hyperlipidemia Mother     Hyperlipidemia Father     Stroke Father     Skin cancer Father     Cataracts Father     Colon cancer Paternal Grandfather           Plan:       Risk Assessment:  This patient is at increased risk of having a genetic mutation that increases the risk of cancer. She meets criteria for genetic testing based on the National Comprehensive Cancer Network (NCCN) criteria due to a personal history of triple negative breast cancer diagnosed under 60y (dx42y) and a family history that is not well known (limited family structure)  (see family history and pedigree). Based on her likelihood of having a mutation, BRCA1/2 analysis with myRisk testing through [employer] was described in detail.    Education and Counseling:  [employer] is a gene panel that evaluates a broad number of hereditary cancer syndromes to help define patients' cancer risk with a focus on eight primary cancer sites (breast, ovarian,  gastric, colorectal, pancreatic, melanoma, prostate, and endometrial). This test is appropriate for patients that meet criteria for genetic testing for Breast and Ovarian Cancer Syndrome. This panel will test for genes that increase cancer risk APC, MARIO, AXIN2, BAP1, BARD1, BMPR1A, BRCA1, BRCA2, BRIP1, CDH1, CDK4, CDKN2A, CHEK2, CTNNA1, FH, FLCN, HOXB13 (seq only), MEN1, MET, MLH1, MSH2, MSH3 (excluding repetitive portions of exon 1), MSH6, MUTYH, NTHL1, PALB2, PMS2, PTEN, RAD51C, RAD51D, SDHA, SDHB, SDHC, SDHD, SMAD4, STK11, TP53, TSC1, TSC2, VHL.    Risks of cancer associated with inherited cancer predisposition mutations were discussed in detail.  If a mutation were found, this patient would have a significantly increased risk for cancer.  It has been shown that individuals with a BRCA1 or BRCA2 mutation face a 56-87% lifetime risk of breast cancer and a 27-44% lifetime risk of ovarian cancer. Mutation carriers who have had breast cancer have a 20% (BRCA1) or 12% (BRCA2) chance of developing a second breast cancer within 5 years, and up to 64% (BRCA1) or 52% (BRCA2) of a second breast cancer by age 70. Mutation carriers have up to a 5% risk of pancreatic cancer, as well as increased risk for other cancers such as colon cancer and lymphoma. Other inherited cancer syndromes that are also included in the myRisk test, may have different, but still significant risk for cancer.    The availability of clinical management options for inherited cancer predisposition mutation carriers was discussed, including increased surveillance, chemo prevention, and prophylactic surgery. Details of the testing process, including benefits and limitations of genetic analysis as well as the implications of possible test results, were discussed.  Because this patient is the first member of her family to be tested comprehensive testing was presented.  Related insurance issues were discussed.      Summary:  This patient was evaluated for  hereditary risk of cancer and was found to be at an increased risk of having an inherited cancer predisposition mutation.  The option of genetic testing was explained in detail, including the possible impact of this information on family members.  Since this patient wishes to proceed with testing an informed consent will be obtained and blood drawn and sent to [email or web address].  Expedited testing will be requested. Results will be expected in approximately 2 weeks from this time.  A follow-up appointment will be scheduled for results disclosure.        The patient location is: home    Visit type: audiovisual    Face to Face time with patient: 30 minutes  >60 minutes of total time spent on the encounter, which includes face to face time and non-face to face time preparing to see the patient (eg, review of tests), Obtaining and/or reviewing separately obtained history, Documenting clinical information in the electronic or other health record, Independently interpreting results (not separately reported) and communicating results to the patient/family/caregiver, or Care coordination (not separately reported).         Each patient to whom he or she provides medical services by telemedicine is:  (1) informed of the relationship between the physician and patient and the respective role of any other health care provider with respect to management of the patient; and (2) notified that he or she may decline to receive medical services by telemedicine and may withdraw from such care at any time.      ASPEN KEE, PhD    Answers for HPI/ROS submitted by the patient on 6/6/2022  appetite change : No  unexpected weight change: No  mouth sores: No  visual disturbance: No  cough: No  shortness of breath: Yes  chest pain: No  abdominal pain: No  diarrhea: No  frequency: No  back pain: No  rash: No  headaches: No  adenopathy: No  nervous/ anxious: Yes

## 2022-06-09 ENCOUNTER — TELEPHONE (OUTPATIENT)
Dept: SURGERY | Facility: CLINIC | Age: 42
End: 2022-06-09
Payer: COMMERCIAL

## 2022-06-09 NOTE — TELEPHONE ENCOUNTER
Called patient with her shoulder Xray results.  Patient states that she sees an Orthopedist at [facility].  She states she will call for an appointment with them.       ----- Message from Genie Josue MD sent at 6/9/2022  7:05 AM CDT -----  Please call patient and let her know - shoulder X-ray was normal. We can refer her to Ortho for further evaluation

## 2022-06-15 ENCOUNTER — TELEPHONE (OUTPATIENT)
Dept: SURGERY | Facility: CLINIC | Age: 42
End: 2022-06-15
Payer: COMMERCIAL

## 2022-06-15 NOTE — TELEPHONE ENCOUNTER
Spoke with patient regarding her MRI results.  She states that she is leaning more towards a bilateral mastectomy.      ----- Message from Genie Josue MD sent at 6/15/2022  2:57 PM CDT -----  Please call and let her know the area that we thought was 3 small lesion appears to me more like 1 lesion measuring 2.7 cm. There also additional areas of concern that would need further biopsy if breast conserving surgery. Dr. Josue to explain more at follow up.

## 2022-06-20 NOTE — H&P (VIEW-ONLY)
[facility] Breast Surg  Breast Surgical Oncology  Follow-Up Patient Office Visit       Referring Provider: No ref. provider found  PCP: Aliya Winslow MD   Care Team: No care team member to display No care team member to display     Chief Complaint:   Chief Complaint   Patient presents with    Follow-up     Followup for Right Breast Caner Preop        Subjective:   Treatment History:  2022-Genetic Testing  Medical Oncology Referral to Dr. Marcy Del Valle    Interval History:  2022 - Rob Dennison presents today to discuss surgical options.    HPI:  Rob Dennison is a 42 y.o. female who presents on 2022 for evaluation of newly diagnosed right  breast cancer.     A detailed patient history was obtained and reviewed. She currently denies any breast issues including rashes, redness, pain, swelling, nipple discharge, or new lumps/masses.     MG breast density: Category C ( Heterogeneously dense)      Imagin. 3 BL SC MG at [facility] - which revealed a a focal asymmetry in the right breast central to the nipple, posterior depth.    No other significant masses, calcifications, or other findings are seen in either breast.  BIRADS-0; additional imaging needed.     2. 2022 R DG MG/ R US BREAST LIMITED at [facility] - which revealed on R MG at central region posterior depth focal asymmetry recalled from screening mammography. On today's additional mammographic views, there are three adjacent oval equal density masses with indistinct margins located in the central region far posterior depth. The most posterior of these three masses correlates with the focal asymmetry recalled from screening. No other significant mammographic finding.  On R US, at 7 o'clock to 9 o'clock and subareolar region revealed three adjacent oval hypoechoic masses with indistinct margins at the 9 o'clock  subareolar position, correlating with the three adjacent oval equal density masses with indistinct  margins seen on today's mammogram. These three masses measure 0.5 x 0.5 x 0.4 cm, 0.9 x 0.9 x 0.8 cm, and 0.8 x 0.4 x 0.5 cm.   Incidentally seen at 7 o'clock to 8 o'clock  are several subcentimeter cysts of varying size and complication. These are benign. No suspicious right axillary adenopathy. BIRADS-4 suspicious; biopsy recommended.    3. 6/8/2022 Xray Right Shoulder at [facility] - which revealed no acute osseous abnormality.     4. 6/9/2022 BL BREAST MRI at [facility] - which revealed in R breast, 9 o'clock subareolar right breast, posterior depth, there is a 2.7 x 1.5 x 1.6 cm oval mass with non circumscribed margins which demonstrates rapid, washout kinetics (axial image 333).  There is a signal void consistent with a T3 coil shaped HydroMARK clip within this known malignancy.  In the subareolar right breast, anterior to middle depth, there is a 0.7 x 0.3 x 0.7 cm oval, enhancing mass with circumscribed margins (axial image 335).  This mass is 3.5 cm anterior to the known malignancy and 2.8 cm deep to the nipple.   Inferomedial to the known malignancy, in the lower inner quadrant of the right breast, posterior depth, there are 2 areas of clumped, non mass enhancement.  The deeper of the 2 areas of clumped, non mass enhancement spans 0.8 cm (axial image 341) and is 1.4 cm anterior to the pectoralis major muscle.  The more superficial area of clumped, non mass enhancement spans 0.7 cm (axial image 342).   Superomedial to the known malignancy, in the upper inner quadrant of the right breast, middle depth, there is linear, non mass enhancement spanning 1.7 cm anterior to posterior (axial image 327).  In total the abnormal enhancement spans 7.6 cm anterior to posterior.  There is no skin thickening or nipple retraction.  No axillary or internal mammary lymphadenopathy is identified.  In the L breast, there is no suspicious areas of enhancement or areas of architectural distortion are seen.  There is no skin thickening or  nipple retraction.  No axillary or internal mammary lymphadenopathy is identified.  BIRADS-4 suspicious          Pathology:  1. 2022 Ultrasound-guided Core Needle Biopsy Right Breast Mass 9 o'clock Subareolar-  -Invasive ductal carcinoma, grade 3 (measuring 5.0mm)  ER %  [place] 05  HER2 Negative  Ki67 63%     OB/GYN History:  Age at Menarche Onset: 10  Menopausal Status: premenopausal, LMP: 2022   Hysterectomy/Oophorectomy: no,n/a  Hormonal birth control (duration): Yes OCP (estrogen/progesterone).   Pregnancy History:   Age at first live birth: 1  Hormone Replacement Therapy: No, none  Patient admits to nipple discharge. Described as bilateral and clear after having first mammogram which has resolved   Patient denies to previous breast biopsy.   Patient denies to a personal history of breast cancer.     Other Relevant History:  Prior thoracic RT: none  Genetic testing: yes  [employer] descent: No     Family History:  Father - Skin cancer possible melanoma at age 80  Paternal Grandfather - colon cancer at age 70     Past History:  Past Medical History:   Diagnosis Date    Anxiety     Depression     Hypertension         Past Surgical History:   Procedure Laterality Date    BACK SURGERY      CHOLECYSTECTOMY      ENDOSCOPIC RELEASE OF BOTH CARPAL TUNNELS Bilateral     TRIGGER FINGER RELEASE Right         Social History     Socioeconomic History    Marital status: Legally    Tobacco Use    Smoking status: Never Smoker    Smokeless tobacco: Never Used   Substance and Sexual Activity    Alcohol use: Yes     Comment: Occasionally    Drug use: Never    Sexual activity: Yes        Body mass index is 34.04 kg/m².     Allergy/Medications:   Review of patient's allergies indicates:  No Known Allergies       Current Outpatient Medications:     acyclovir (ZOVIRAX) 400 MG tablet, Take 400 mg by mouth once daily., Disp: , Rfl:     cyanocobalamin 2000 MCG tablet, Take 2,000 mcg by mouth once  daily., Disp: , Rfl:     DULoxetine (CYMBALTA) 60 MG capsule, Take 120 mg by mouth once daily., Disp: , Rfl:     DULoxetine (CYMBALTA) 60 MG capsule,  See Instructions, TAKE 1 CAPSULE BY MOUTH DAILY, # 90 cap(s), 1 Refill(s), Pharmacy: [id number], 155, cm, Height/Length Dosing, 02/22/21 13:16:00 CST, 79.83, kg, Weight Dosing, 02/22/21 13:16:00 CST, Disp: , Rfl:     fluticasone propionate (FLONASE) 50 mcg/actuation nasal spray, 1 spray by Each Nostril route once daily., Disp: , Rfl:     hyoscyamine (LEVSIN/SL) 0.125 mg Subl, DISSOLVE 1 TABLET UNDER THE TONGUE EVERY 6 HOURS AS NEEDED FOR CRAMPING OR DIARRHEA, Disp: , Rfl:     lisinopriL 10 MG tablet, Take 10 mg by mouth once daily., Disp: , Rfl:     loratadine (CLARITIN) 10 mg tablet, Take 10 mg by mouth once daily., Disp: , Rfl:     multivitamin (THERAGRAN) per tablet, Take 1 tablet by mouth once daily., Disp: , Rfl:     ondansetron (ZOFRAN-ODT) 8 MG TbDL, DISSOLVE ONE TABLET BY MOUTH EVERY 8 HOURS AS NEEDED FORNAUSEA AND VOMITING, Disp: , Rfl:     pyridoxine, vitamin B6, (B-6) 100 MG Tab, Take 50 mg by mouth once daily., Disp: , Rfl:     spironolactone (ALDACTONE) 100 MG tablet, Take 100 mg by mouth every morning., Disp: , Rfl:     vitamin E 400 UNIT capsule, Take 400 Units by mouth once daily., Disp: , Rfl:     VIENVA 0.1-20 mg-mcg per tablet, Take 1 tablet by mouth once daily., Disp: , Rfl:        Review of Systems:  Constitutional: denies fevers, chills, weight loss  HEENT: denies blurry/double vision, changes in hearing, odynophagia, dysphagia  Respiratory: denies cough, shortness of breath  Cardiovascular: denies palpitations, swelling of the extremities  GI: denies abdominal pain, nausea/vomiting, hematochezia, frequent stools  : denies frequency, dysuria, flank pain, hematuria  Skin: denies new rashes  Neurological: denies muscular/sensory deficiencies, loss of coordination, headaches, memory changes  Endo: denies hair  loss/thinning, nervousness, hot flashes, heat/cold intolerance, lumps in the neck area  Heme: denies easy bruising and fatigue  Psychological: denies anxious/depressive moods  Musculoskeletal: denies bony pain, muscle cramps, swollen joints    Objective:     Vitals:  Blood pressure 132/84, pulse 87, temperature 98.8 °F (37.1 °C), temperature source Oral, resp. rate 20, height 5' (1.524 m), weight 79.1 kg (174 lb 4.8 oz), last menstrual period 06/22/2022, SpO2 99 %.      Physical Exam:  General: The patient is awake, alert and oriented times three. The patient is well nourished and in no acute distress.   Neck: There is no evidence of palpable cervical, supraclavicular or axillary adenopathy. The neck is supple. The thyroid is not enlarged.   Musculoskeletal: The patient has a normal range of motion of her bilateral upper extremities.   Chest: Examination of the chest wall fails to reveal any obvious abnormalities. The lungs are clear to auscultation bilaterally without rales, rhonchi, or wheezing.   Cardiovascular: The heart has a regular rate and rhythm without murmurs, gallops or rubs.   Breast: Examination of right breast fails to reveal any dominant masses or areas of significant focal nodularity. The nipple is everted without evidence of discharge. There is no skin dimpling with movement of the pectoralis. There is no significant skin changes overlying the breast. Examination of the left breast fails to reveal any dominant masses or areas of significant focal nodularity. The nipple is everted without evidence of discharge. There is no skin dimpling with movement of the pectoralis. There is no significant skin changes overlying the breast.  Abdomen: The abdomen is soft, flat, nontender and nondistended with no palpable masses or organomegaly.  Integumentary: no rashes or skin lesions present  Neurologic: cranial nerves intact, no signs of peripheral neurological deficit, motor/sensory function  intact    Assessment and Plan:     Encounter Diagnoses   Name Primary?    Malignant neoplasm of central portion of right breast in female, estrogen receptor negative Yes    Venous insufficiency         We discussed the details and findings of her recent breast MRI. Dr. Del Valle and I spoke about her and agreed that proceeding with neoadjuvant chemotherapy first is best.    She requires chemotherapy and has venous insufficiency.     I then spent about twenty minutes with the patient explaining the procedure of the port placement.  I explained to her that this would be an outpatient procedure with local anesthesia and sedation.  The port would be placed under her skin with a catheter which runs into the major vein in her neck or chest.  I explained to her my initial access site would be the subclavian vein which runs beneath the clavicle. If not successful, then the second option would be to do another incision in the neck to access the internal jugular vein.  The risks and complications of pain, bleeding, infection, blood clot, scarring, the inability to access the port, clogging of the port and pneumothorax/hemothorax requiring chest tube or additional surgery were all explained in detail.      Informed consent was obtained, and we shall arrange the surgery at the earliest available opportunity.         Plan:     1. Surgery - Mediport Placement  Tentative Date: 7/1/2022  2. Preop - BMP and CBC  3. Surgical policies and instructions     All of questions were answered    Genie Josue MD  Breast Surgical Oncology     OFFICE VISIT CODING:    Non-face-to-face time included:  Yes Preparing to see the patient such as reviewing the patient record  No Obtaining and reviewing separately obtained history  No Independently interpreting results  Yes Documenting clinical information in electronic health record  No Ordering appropriate medications  Yes Ordering appropriate tests  Yes Ordering appropriate procedures (including  follow-up)  Yes Referring and communicating with other health care professionals (not separately reported)  Yes Care Coordination (not separately reported)    Face-to-face time included:  Yes Performing a medically necessary appropriate history, examination, and/or evaluation  Yes Communicating results to the patient/family/caregiver  Yes Counseling and educating the patient/family/caregiver  Yes Answering patient/family/caregiver questions    Total Time: 40 minutes    Total time includes both face-to-face and non-face-to-face time personally spent by myself on the day of the visit.

## 2022-06-20 NOTE — PROGRESS NOTES
[facility] Breast [facility]  Breast Surgical Oncology  Follow-Up Patient Office Visit       Referring Provider: No ref. provider found  PCP: Aliya Winslow MD   Care Team: No care team member to display No care team member to display     Chief Complaint:   Chief Complaint   Patient presents with    Follow-up     Followup for Right Breast Caner Preop        Subjective:   Treatment History:  2022-Genetic Testing  Medical Oncology Referral to Dr. Marcy Del Valle    Interval History:  2022 - Rob Dennison presents today to discuss surgical options.    HPI:  Rob Dennison is a 42 y.o. female who presents on 2022 for evaluation of newly diagnosed right  breast cancer.     A detailed patient history was obtained and reviewed. She currently denies any breast issues including rashes, redness, pain, swelling, nipple discharge, or new lumps/masses.     MG breast density: Category C ( Heterogeneously dense)      Imagin. 3 BL SC MG at [facility] - which revealed a a focal asymmetry in the right breast central to the nipple, posterior depth.    No other significant masses, calcifications, or other findings are seen in either breast.  BIRADS-0; additional imaging needed.     2. 2022 R DG MG/ R US BREAST LIMITED at [facility] - which revealed on R MG at central region posterior depth focal asymmetry recalled from screening mammography. On today's additional mammographic views, there are three adjacent oval equal density masses with indistinct margins located in the central region far posterior depth. The most posterior of these three masses correlates with the focal asymmetry recalled from screening. No other significant mammographic finding.  On R US, at 7 o'clock to 9 o'clock and subareolar region revealed three adjacent oval hypoechoic masses with indistinct margins at the 9 o'clock  subareolar position, correlating with the three adjacent oval equal density masses with indistinct  margins seen on today's mammogram. These three masses measure 0.5 x 0.5 x 0.4 cm, 0.9 x 0.9 x 0.8 cm, and 0.8 x 0.4 x 0.5 cm.   Incidentally seen at 7 o'clock to 8 o'clock  are several subcentimeter cysts of varying size and complication. These are benign. No suspicious right axillary adenopathy. BIRADS-4 suspicious; biopsy recommended.    3. 6/8/2022 Xray Right Shoulder at [facility] - which revealed no acute osseous abnormality.     4. 6/9/2022 BL BREAST MRI at [facility] - which revealed in R breast, 9 o'clock subareolar right breast, posterior depth, there is a 2.7 x 1.5 x 1.6 cm oval mass with non circumscribed margins which demonstrates rapid, washout kinetics (axial image 333).  There is a signal void consistent with a T3 coil shaped HydroMARK clip within this known malignancy.  In the subareolar right breast, anterior to middle depth, there is a 0.7 x 0.3 x 0.7 cm oval, enhancing mass with circumscribed margins (axial image 335).  This mass is 3.5 cm anterior to the known malignancy and 2.8 cm deep to the nipple.   Inferomedial to the known malignancy, in the lower inner quadrant of the right breast, posterior depth, there are 2 areas of clumped, non mass enhancement.  The deeper of the 2 areas of clumped, non mass enhancement spans 0.8 cm (axial image 341) and is 1.4 cm anterior to the pectoralis major muscle.  The more superficial area of clumped, non mass enhancement spans 0.7 cm (axial image 342).   Superomedial to the known malignancy, in the upper inner quadrant of the right breast, middle depth, there is linear, non mass enhancement spanning 1.7 cm anterior to posterior (axial image 327).  In total the abnormal enhancement spans 7.6 cm anterior to posterior.  There is no skin thickening or nipple retraction.  No axillary or internal mammary lymphadenopathy is identified.  In the L breast, there is no suspicious areas of enhancement or areas of architectural distortion are seen.  There is no skin thickening or  nipple retraction.  No axillary or internal mammary lymphadenopathy is identified.  BIRADS-4 suspicious          Pathology:  1. 2022 Ultrasound-guided Core Needle Biopsy Right Breast Mass 9 o'clock Subareolar-  -Invasive ductal carcinoma, grade 3 (measuring 5.0mm)  ER %  OH 05  HER2 Negative  Ki67 63%     OB/GYN History:  Age at Menarche Onset: 10  Menopausal Status: premenopausal, LMP: 2022   Hysterectomy/Oophorectomy: no,n/a  Hormonal birth control (duration): Yes OCP (estrogen/progesterone).   Pregnancy History:   Age at first live birth: 1  Hormone Replacement Therapy: No, none  Patient admits to nipple discharge. Described as bilateral and clear after having first mammogram which has resolved   Patient denies to previous breast biopsy.   Patient denies to a personal history of breast cancer.     Other Relevant History:  Prior thoracic RT: none  Genetic testing: yes  Ash[employer] descent: No     Family History:  Father - Skin cancer possible melanoma at age 80  Paternal Grandfather - colon cancer at age 70     Past History:  Past Medical History:   Diagnosis Date    Anxiety     Depression     Hypertension         Past Surgical History:   Procedure Laterality Date    BACK SURGERY      CHOLECYSTECTOMY      ENDOSCOPIC RELEASE OF BOTH CARPAL TUNNELS Bilateral     TRIGGER FINGER RELEASE Right         Social History     Socioeconomic History    Marital status: Legally    Tobacco Use    Smoking status: Never Smoker    Smokeless tobacco: Never Used   Substance and Sexual Activity    Alcohol use: Yes     Comment: Occasionally    Drug use: Never    Sexual activity: Yes        Body mass index is 34.04 kg/m².     Allergy/Medications:   Review of patient's allergies indicates:  No Known Allergies       Current Outpatient Medications:     acyclovir (ZOVIRAX) 400 MG tablet, Take 400 mg by mouth once daily., Disp: , Rfl:     cyanocobalamin 2000 MCG tablet, Take 2,000 mcg by mouth once  daily., Disp: , Rfl:     DULoxetine (CYMBALTA) 60 MG capsule, Take 120 mg by mouth once daily., Disp: , Rfl:     DULoxetine (CYMBALTA) 60 MG capsule,  See Instructions, TAKE 1 CAPSULE BY MOUTH DAILY, # 90 cap(s), 1 Refill(s), Pharmacy: [id number], 155, cm, Height/Length Dosing, 02/22/21 13:16:00 CST, 79.83, kg, Weight Dosing, 02/22/21 13:16:00 CST, Disp: , Rfl:     fluticasone propionate (FLONASE) 50 mcg/actuation nasal spray, 1 spray by Each Nostril route once daily., Disp: , Rfl:     hyoscyamine (LEVSIN/SL) 0.125 mg Subl, DISSOLVE 1 TABLET UNDER THE TONGUE EVERY 6 HOURS AS NEEDED FOR CRAMPING OR DIARRHEA, Disp: , Rfl:     lisinopriL 10 MG tablet, Take 10 mg by mouth once daily., Disp: , Rfl:     loratadine (CLARITIN) 10 mg tablet, Take 10 mg by mouth once daily., Disp: , Rfl:     multivitamin (THERAGRAN) per tablet, Take 1 tablet by mouth once daily., Disp: , Rfl:     ondansetron (ZOFRAN-ODT) 8 MG TbDL, DISSOLVE ONE TABLET BY MOUTH EVERY 8 HOURS AS NEEDED FORNAUSEA AND VOMITING, Disp: , Rfl:     pyridoxine, vitamin B6, (B-6) 100 MG Tab, Take 50 mg by mouth once daily., Disp: , Rfl:     spironolactone (ALDACTONE) 100 MG tablet, Take 100 mg by mouth every morning., Disp: , Rfl:     vitamin E 400 UNIT capsule, Take 400 Units by mouth once daily., Disp: , Rfl:     VIENVA 0.1-20 mg-mcg per tablet, Take 1 tablet by mouth once daily., Disp: , Rfl:        Review of Systems:  Constitutional: denies fevers, chills, weight loss  HEENT: denies blurry/double vision, changes in hearing, odynophagia, dysphagia  Respiratory: denies cough, shortness of breath  Cardiovascular: denies palpitations, swelling of the extremities  GI: denies abdominal pain, nausea/vomiting, hematochezia, frequent stools  : denies frequency, dysuria, flank pain, hematuria  Skin: denies new rashes  Neurological: denies muscular/sensory deficiencies, loss of coordination, headaches, memory changes  Endo: denies hair  loss/thinning, nervousness, hot flashes, heat/cold intolerance, lumps in the neck area  Heme: denies easy bruising and fatigue  Psychological: denies anxious/depressive moods  Musculoskeletal: denies bony pain, muscle cramps, swollen joints    Objective:     Vitals:  Blood pressure 132/84, pulse 87, temperature 98.8 °F (37.1 °C), temperature source Oral, resp. rate 20, height 5' (1.524 m), weight 79.1 kg (174 lb 4.8 oz), last menstrual period 06/22/2022, SpO2 99 %.      Physical Exam:  General: The patient is awake, alert and oriented times three. The patient is well nourished and in no acute distress.   Neck: There is no evidence of palpable cervical, supraclavicular or axillary adenopathy. The neck is supple. The thyroid is not enlarged.   Musculoskeletal: The patient has a normal range of motion of her bilateral upper extremities.   Chest: Examination of the chest wall fails to reveal any obvious abnormalities. The lungs are clear to auscultation bilaterally without rales, rhonchi, or wheezing.   Cardiovascular: The heart has a regular rate and rhythm without murmurs, gallops or rubs.   Breast: Examination of right breast fails to reveal any dominant masses or areas of significant focal nodularity. The nipple is everted without evidence of discharge. There is no skin dimpling with movement of the pectoralis. There is no significant skin changes overlying the breast. Examination of the left breast fails to reveal any dominant masses or areas of significant focal nodularity. The nipple is everted without evidence of discharge. There is no skin dimpling with movement of the pectoralis. There is no significant skin changes overlying the breast.  Abdomen: The abdomen is soft, flat, nontender and nondistended with no palpable masses or organomegaly.  Integumentary: no rashes or skin lesions present  Neurologic: cranial nerves intact, no signs of peripheral neurological deficit, motor/sensory function  intact    Assessment and Plan:     Encounter Diagnoses   Name Primary?    Malignant neoplasm of central portion of right breast in female, estrogen receptor negative Yes    Venous insufficiency         We discussed the details and findings of her recent breast MRI. Dr. Del Valle and I spoke about her and agreed that proceeding with neoadjuvant chemotherapy first is best.    She requires chemotherapy and has venous insufficiency.     I then spent about twenty minutes with the patient explaining the procedure of the port placement.  I explained to her that this would be an outpatient procedure with local anesthesia and sedation.  The port would be placed under her skin with a catheter which runs into the major vein in her neck or chest.  I explained to her my initial access site would be the subclavian vein which runs beneath the clavicle. If not successful, then the second option would be to do another incision in the neck to access the internal jugular vein.  The risks and complications of pain, bleeding, infection, blood clot, scarring, the inability to access the port, clogging of the port and pneumothorax/hemothorax requiring chest tube or additional surgery were all explained in detail.      Informed consent was obtained, and we shall arrange the surgery at the earliest available opportunity.         Plan:     1. Surgery - Mediport Placement  Tentative Date: 7/1/2022  2. Preop - BMP and CBC  3. Surgical policies and instructions     All of questions were answered    Genie Josue MD  Breast Surgical Oncology     OFFICE VISIT CODING:    Non-face-to-face time included:  Yes Preparing to see the patient such as reviewing the patient record  No Obtaining and reviewing separately obtained history  No Independently interpreting results  Yes Documenting clinical information in electronic health record  No Ordering appropriate medications  Yes Ordering appropriate tests  Yes Ordering appropriate procedures (including  follow-up)  Yes Referring and communicating with other health care professionals (not separately reported)  Yes Care Coordination (not separately reported)    Face-to-face time included:  Yes Performing a medically necessary appropriate history, examination, and/or evaluation  Yes Communicating results to the patient/family/caregiver  Yes Counseling and educating the patient/family/caregiver  Yes Answering patient/family/caregiver questions    Total Time: 40 minutes    Total time includes both face-to-face and non-face-to-face time personally spent by myself on the day of the visit.

## 2022-06-23 ENCOUNTER — OFFICE VISIT (OUTPATIENT)
Dept: SURGERY | Facility: CLINIC | Age: 42
End: 2022-06-23
Payer: COMMERCIAL

## 2022-06-23 ENCOUNTER — LAB VISIT (OUTPATIENT)
Dept: LAB | Facility: HOSPITAL | Age: 42
End: 2022-06-23
Attending: SURGERY
Payer: COMMERCIAL

## 2022-06-23 ENCOUNTER — HOSPITAL ENCOUNTER (OUTPATIENT)
Dept: RADIOLOGY | Facility: HOSPITAL | Age: 42
Discharge: HOME | End: 2022-06-23
Attending: SURGERY
Payer: COMMERCIAL

## 2022-06-23 VITALS
RESPIRATION RATE: 20 BRPM | WEIGHT: 174.31 LBS | DIASTOLIC BLOOD PRESSURE: 84 MMHG | HEIGHT: 60 IN | BODY MASS INDEX: 34.22 KG/M2 | SYSTOLIC BLOOD PRESSURE: 132 MMHG | OXYGEN SATURATION: 99 % | HEART RATE: 87 BPM | TEMPERATURE: 99 F

## 2022-06-23 DIAGNOSIS — C50.111 MALIGNANT NEOPLASM OF CENTRAL PORTION OF RIGHT BREAST IN FEMALE, ESTROGEN RECEPTOR NEGATIVE: Primary | ICD-10-CM

## 2022-06-23 DIAGNOSIS — Z17.1 MALIGNANT NEOPLASM OF CENTRAL PORTION OF RIGHT BREAST IN FEMALE, ESTROGEN RECEPTOR NEGATIVE: ICD-10-CM

## 2022-06-23 DIAGNOSIS — C50.111 MALIGNANT NEOPLASM OF CENTRAL PORTION OF RIGHT BREAST IN FEMALE, ESTROGEN RECEPTOR NEGATIVE: ICD-10-CM

## 2022-06-23 DIAGNOSIS — Z17.1 MALIGNANT NEOPLASM OF CENTRAL PORTION OF RIGHT BREAST IN FEMALE, ESTROGEN RECEPTOR NEGATIVE: Primary | ICD-10-CM

## 2022-06-23 DIAGNOSIS — I87.2 VENOUS INSUFFICIENCY: ICD-10-CM

## 2022-06-23 LAB
ANION GAP SERPL CALC-SCNC: 8 MEQ/L
BASOPHILS # BLD AUTO: 0.06 X10(3)/MCL (ref 0–0.2)
BASOPHILS NFR BLD AUTO: 0.7 %
BUN SERPL-MCNC: 7.9 MG/DL (ref 7–18.7)
CALCIUM SERPL-MCNC: 9.7 MG/DL (ref 8.4–10.2)
CHLORIDE SERPL-SCNC: 104 MMOL/L (ref 98–107)
CO2 SERPL-SCNC: 26 MMOL/L (ref 22–29)
CREAT SERPL-MCNC: 0.69 MG/DL (ref 0.55–1.02)
CREAT/UREA NIT SERPL: 11
EOSINOPHIL # BLD AUTO: 0.12 X10(3)/MCL (ref 0–0.9)
EOSINOPHIL NFR BLD AUTO: 1.3 %
ERYTHROCYTE [DISTWIDTH] IN BLOOD BY AUTOMATED COUNT: 12.8 % (ref 11.5–17)
GLUCOSE SERPL-MCNC: 78 MG/DL (ref 74–100)
HCT VFR BLD AUTO: 43.4 % (ref 37–47)
HGB BLD-MCNC: 14.6 GM/DL (ref 12–16)
IMM GRANULOCYTES # BLD AUTO: 0.02 X10(3)/MCL (ref 0–0.02)
IMM GRANULOCYTES NFR BLD AUTO: 0.2 % (ref 0–0.43)
LYMPHOCYTES # BLD AUTO: 2.9 X10(3)/MCL (ref 0.6–4.6)
LYMPHOCYTES NFR BLD AUTO: 31.8 %
MCH RBC QN AUTO: 29.8 PG (ref 27–31)
MCHC RBC AUTO-ENTMCNC: 33.6 MG/DL (ref 33–36)
MCV RBC AUTO: 88.6 FL (ref 80–94)
MONOCYTES # BLD AUTO: 0.97 X10(3)/MCL (ref 0.1–1.3)
MONOCYTES NFR BLD AUTO: 10.6 %
NEUTROPHILS # BLD AUTO: 5.1 X10(3)/MCL (ref 2.1–9.2)
NEUTROPHILS NFR BLD AUTO: 55.4 %
NRBC BLD AUTO-RTO: 0 %
PLATELET # BLD AUTO: 396 X10(3)/MCL (ref 130–400)
PMV BLD AUTO: 10.2 FL (ref 9.4–12.4)
POTASSIUM SERPL-SCNC: 4.8 MMOL/L (ref 3.5–5.1)
RBC # BLD AUTO: 4.9 X10(6)/MCL (ref 4.2–5.4)
SODIUM SERPL-SCNC: 138 MMOL/L (ref 136–145)
WBC # SPEC AUTO: 9.1 X10(3)/MCL (ref 4.5–11.5)

## 2022-06-23 PROCEDURE — 99999 PR PBB SHADOW E&M-EST. PATIENT-LVL V: CPT | Mod: PBBFAC,,, | Performed by: SURGERY

## 2022-06-23 PROCEDURE — 80048 BASIC METABOLIC PNL TOTAL CA: CPT | Performed by: SURGERY

## 2022-06-23 PROCEDURE — 36415 COLL VENOUS BLD VENIPUNCTURE: CPT

## 2022-06-23 PROCEDURE — 99215 OFFICE O/P EST HI 40 MIN: CPT | Mod: S$GLB,,, | Performed by: SURGERY

## 2022-06-23 PROCEDURE — 4010F PR ACE/ARB THEARPY RXD/TAKEN: ICD-10-PCS | Mod: CPTII,S$GLB,, | Performed by: SURGERY

## 2022-06-23 PROCEDURE — 3075F PR MOST RECENT SYSTOLIC BLOOD PRESS GE 130-139MM HG: ICD-10-PCS | Mod: CPTII,S$GLB,, | Performed by: SURGERY

## 2022-06-23 PROCEDURE — 1159F MED LIST DOCD IN RCRD: CPT | Mod: CPTII,S$GLB,, | Performed by: SURGERY

## 2022-06-23 PROCEDURE — 3008F BODY MASS INDEX DOCD: CPT | Mod: CPTII,S$GLB,, | Performed by: SURGERY

## 2022-06-23 PROCEDURE — 71045 X-RAY EXAM CHEST 1 VIEW: CPT | Mod: TC

## 2022-06-23 PROCEDURE — 85025 COMPLETE CBC W/AUTO DIFF WBC: CPT | Performed by: SURGERY

## 2022-06-23 PROCEDURE — 99215 PR OFFICE/OUTPT VISIT, EST, LEVL V, 40-54 MIN: ICD-10-PCS | Mod: S$GLB,,, | Performed by: SURGERY

## 2022-06-23 PROCEDURE — 3075F SYST BP GE 130 - 139MM HG: CPT | Mod: CPTII,S$GLB,, | Performed by: SURGERY

## 2022-06-23 PROCEDURE — 4010F ACE/ARB THERAPY RXD/TAKEN: CPT | Mod: CPTII,S$GLB,, | Performed by: SURGERY

## 2022-06-23 PROCEDURE — 1160F RVW MEDS BY RX/DR IN RCRD: CPT | Mod: CPTII,S$GLB,, | Performed by: SURGERY

## 2022-06-23 PROCEDURE — 3008F PR BODY MASS INDEX (BMI) DOCUMENTED: ICD-10-PCS | Mod: CPTII,S$GLB,, | Performed by: SURGERY

## 2022-06-23 PROCEDURE — 1160F PR REVIEW ALL MEDS BY PRESCRIBER/CLIN PHARMACIST DOCUMENTED: ICD-10-PCS | Mod: CPTII,S$GLB,, | Performed by: SURGERY

## 2022-06-23 PROCEDURE — 3079F PR MOST RECENT DIASTOLIC BLOOD PRESSURE 80-89 MM HG: ICD-10-PCS | Mod: CPTII,S$GLB,, | Performed by: SURGERY

## 2022-06-23 PROCEDURE — 3079F DIAST BP 80-89 MM HG: CPT | Mod: CPTII,S$GLB,, | Performed by: SURGERY

## 2022-06-23 PROCEDURE — 99999 PR PBB SHADOW E&M-EST. PATIENT-LVL V: ICD-10-PCS | Mod: PBBFAC,,, | Performed by: SURGERY

## 2022-06-23 PROCEDURE — 1159F PR MEDICATION LIST DOCUMENTED IN MEDICAL RECORD: ICD-10-PCS | Mod: CPTII,S$GLB,, | Performed by: SURGERY

## 2022-06-23 NOTE — LETTER
June 24, 2022        Aliya Winslow MD  [address]  [address]  [place] [address]             [facility] [place] [facility]  [address]  [place] [address]  Phone: [phone]  Fax: [phone]   Patient: Rob Dennison   MR Number: [id number]   YOB: 1980   Date of Visit: 6/23/2022       Dear Dr. Winslow:    Thank you for referring Rob Dennison to me for evaluation. Attached you will find relevant portions of my assessment and plan of care.    If you have questions, please do not hesitate to call me. I look forward to following Rob Dennison along with you.    Sincerely,      eGnie Josue MD            CC  No Recipients    Enclosure

## 2022-06-27 ENCOUNTER — OFFICE VISIT (OUTPATIENT)
Dept: HEMATOLOGY/ONCOLOGY | Facility: CLINIC | Age: 42
End: 2022-06-27
Payer: COMMERCIAL

## 2022-06-27 DIAGNOSIS — Z15.09 BRCA1 GENE MUTATION POSITIVE: ICD-10-CM
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
## 2022-06-27 DIAGNOSIS — Z17.1 MALIGNANT NEOPLASM OF CENTRAL PORTION OF RIGHT BREAST IN FEMALE, ESTROGEN RECEPTOR NEGATIVE: Primary | ICD-10-CM

## 2022-06-27 DIAGNOSIS — C50.111 MALIGNANT NEOPLASM OF CENTRAL PORTION OF RIGHT BREAST IN FEMALE, ESTROGEN RECEPTOR NEGATIVE: Primary | ICD-10-CM

## 2022-06-27 DIAGNOSIS — Z17.1 ESTROGEN RECEPTOR NEGATIVE STATUS (ER-): ICD-10-CM

## 2022-06-27 DIAGNOSIS — Z15.01 BRCA1 GENE MUTATION POSITIVE: ICD-10-CM

## 2022-06-27 DIAGNOSIS — C50.411 MALIGNANT NEOPLASM OF UPPER-OUTER QUADRANT OF RIGHT FEMALE BREAST: Primary | ICD-10-CM

## 2022-06-27 PROCEDURE — 99213 OFFICE O/P EST LOW 20 MIN: CPT | Mod: 95,,, | Performed by: NURSE PRACTITIONER

## 2022-06-27 PROCEDURE — 4010F ACE/ARB THERAPY RXD/TAKEN: CPT | Mod: CPTII,95,, | Performed by: NURSE PRACTITIONER

## 2022-06-27 PROCEDURE — 4010F PR ACE/ARB THEARPY RXD/TAKEN: ICD-10-PCS | Mod: CPTII,95,, | Performed by: NURSE PRACTITIONER

## 2022-06-27 PROCEDURE — 99999 PR PBB SHADOW E&M-EST. PATIENT-LVL III: ICD-10-PCS | Mod: PBBFAC,,, | Performed by: NURSE PRACTITIONER

## 2022-06-27 PROCEDURE — 99999 PR PBB SHADOW E&M-EST. PATIENT-LVL III: CPT | Mod: PBBFAC,,, | Performed by: NURSE PRACTITIONER

## 2022-06-27 PROCEDURE — 1159F PR MEDICATION LIST DOCUMENTED IN MEDICAL RECORD: ICD-10-PCS | Mod: CPTII,95,, | Performed by: NURSE PRACTITIONER

## 2022-06-27 PROCEDURE — 1159F MED LIST DOCD IN RCRD: CPT | Mod: CPTII,95,, | Performed by: NURSE PRACTITIONER

## 2022-06-27 PROCEDURE — 99213 PR OFFICE/OUTPT VISIT, EST, LEVL III, 20-29 MIN: ICD-10-PCS | Mod: 95,,, | Performed by: NURSE PRACTITIONER

## 2022-06-27 NOTE — PROGRESS NOTES
REFERRING PHYSICIAN: Dr. Genie Josue    Subjective:       Patient ID: Rob Dennison is a 42 y.o. female.    Chief Complaint: Personal history of recently diagnosed triple negative breast cancer and a family history of cancer. Patient was referred by Dr. Josue to assist with surgical decision-making. Patient presented for genetic counseling on 6/8/2022 and was found appropriate for genetic testing based on the National Comprehensive Cancer Network (NCCN) criteria due to a personal history of triple negative breast cancer diagnosed under 60y (dx42y) and a family history that is not well known (limited family structure)  (see family history and pedigree). She signed consent, lab was drawn and sent to [facility] for BRCA1/2 Analysis with myRisk panel testing. She presented via Telemedicine Visit (audio and video) today for results disclosure.     HPI  Past Medical History:   Diagnosis Date    Anxiety     Depression     Hypertension       Review of patient's allergies indicates:  No Known Allergies     Review of Systems   Constitutional: Negative for appetite change and unexpected weight change.   HENT: Negative for mouth sores.    Eyes: Negative for visual disturbance.   Respiratory: Positive for shortness of breath. Negative for cough.    Cardiovascular: Negative for chest pain.   Gastrointestinal: Negative for abdominal pain and diarrhea.   Genitourinary: Negative for frequency.   Musculoskeletal: Negative for back pain.   Integumentary:  Negative for rash.   Neurological: Negative for headaches.   Hematological: Negative for adenopathy.   Psychiatric/Behavioral: The patient is nervous/anxious.              Problem List Items Addressed This Visit    None       Oncology History   Malignant neoplasm of central portion of right breast in female, estrogen receptor negative   6/2/2022 Initial Diagnosis    Malignant neoplasm of central portion of right breast in female, estrogen receptor negative      6/2/2022 Cancer Staged    Staging form: Breast, AJCC 8th Edition  - Clinical stage from 6/2/2022: Stage IB (cT1b(3), cN0, cM0, G3, ER-, PA-, HER2-)            Family History   Problem Relation Age of Onset    Hypertension Mother     Hyperlipidemia Mother     Hyperlipidemia Father     Stroke Father     Skin cancer Father     Cataracts Father     Colon cancer Paternal Grandfather       Assessment:   Genetic testing for this patient indicated the heterozygous deleterious mutation: BRCA1 c.211A>G ([id number]). No other mutation or variant of uncertain significance was found in the genes included in the panel: APC, MARIO, AXIN2, BAP1, BARD1, BMPR1A, BRCA1, BRCA2, BRIP1, CDH1, CDK4, CDKN2A, CHEK2, CTNNA1, FH, FLCN, HOXB13 (seq only), MEN1, MET, MLH1, MSH2, MSH3 (excluding repetitive portions of exon 1), MSH6, MUTYH, NTHL1, PALB2, PMS2, PTEN, RAD51C, RAD51D, SDHA, SDHB, SDHC, SDHD, SMAD4, STK11, TP53, TSC1, TSC2, VHL.    The heterozygous germline BRCA1 mutation c.211A>G is located near the 3' end of exon 5, and it has been shown to result in abnormal mRNA splicing.  This BRCA1 mutation is associated with a high risk for female breast cancer (28-87% lifetime risk compared to 10.2% general population lifetime risk), ovarian cancer (up to 63% lifetime risk), and elevated risk of pancreatic cancer (risk thought to be higher with known family history of pancreatic cancer).     The National Comprehensive Cancer Network (NCCN) considers an BRCA1 mutation as a high risk for breast cancer and recommends annual breast MRI starting at 25y, annual mammogram and annual breast MRI at age 30. A reasonable schedule is mammogram alternating with breast MRI every six months. NCCN states that risk-reducing prophylactic mastectomy may be considered.     For ovarian cancer risk associated with BRCA1, screening is transvaginal ultrasound and CA-125 beginning at 30-35 years. However, screening has not been shown to allow detection of  ovarian cancer at an early stage. The NCCN recommendation for management of ovarian cancer risk associated with BRCA1 is bilateral salpingo-oophorectomy between 35 and 40 years of age, upon completion of childbearing.     For patients with a family history of pancreatic cancer, consider available options for pancreatic cancer screening, including the possibility of endoscopic ultrasonography (EUS) and MRI/magnetic resonance cholangiopancreatography (MRCP). Screening should begin at For patients with a family history of pancreatic cancer, consider available options for pancreatic cancer screening, including the possibility of endoscopic ultrasonography (EUS) and MRI/magnetic resonance cholangiopancreatography (MRCP). It is recommended that patients who are candidates for pancreatic cancer screening be managed by a multidisciplinary team with experience in screening for pancreatic cancer, preferably within research protocols. Screening should begin at 50y, or 10 years prior to the earliest pancreatic cancer diagnosis in the family. It is recommended that patients who are candidates for pancreatic cancer screening be managed by a multidisciplinary team with experience in screening for pancreatic cancer, preferably within research protocols. Patient does not have much knowledge of her paternal extended family. She will attempt to find out more information and contact me if she discovers pancreatic cancer family history.    Each first degree relative of Ms. Dennison has a 50% risk of having this same mutation. Family members can be tested using specific site analysis. A copy of a simple letter of explanation and her results will be emailed to Ms. Dennison ([email or web address]) to assist with informing family members.     Per patient request, in addition to Dr. Josue, a copy of this note and genetic test results will be forwarded to her GYN, Dr. Aliya Winslow, and her oncologist, Dr. Grace Del Valle in [place].       The patient  location is: home.    Visit type: audiovisual    Face to Face time with patient: 10 minutes  20 minutes of total time spent on the encounter, which includes face to face time and non-face to face time preparing to see the patient (eg, review of tests), Obtaining and/or reviewing separately obtained history, Documenting clinical information in the electronic or other health record, Independently interpreting results (not separately reported) and communicating results to the patient/family/caregiver, or Care coordination (not separately reported).         Each patient to whom he or she provides medical services by telemedicine is:  (1) informed of the relationship between the physician and patient and the respective role of any other health care provider with respect to management of the patient; and (2) notified that he or she may decline to receive medical services by telemedicine and may withdraw from such care at any time.    ASPEN KEE PhD

## 2022-06-28 ENCOUNTER — ANESTHESIA EVENT (OUTPATIENT)
Dept: SURGERY | Facility: HOSPITAL | Age: 42
End: 2022-06-28
Payer: COMMERCIAL

## 2022-06-28 RX ORDER — MEPERIDINE HYDROCHLORIDE 25 MG/ML
12.5 INJECTION INTRAMUSCULAR; INTRAVENOUS; SUBCUTANEOUS EVERY 10 MIN PRN
Status: CANCELLED | OUTPATIENT
Start: 2022-06-28 | End: 2022-06-29

## 2022-06-28 RX ORDER — PROCHLORPERAZINE EDISYLATE 5 MG/ML
5 INJECTION INTRAMUSCULAR; INTRAVENOUS EVERY 30 MIN PRN
Status: CANCELLED | OUTPATIENT
Start: 2022-06-28

## 2022-06-28 RX ORDER — ONDANSETRON 2 MG/ML
4 INJECTION INTRAMUSCULAR; INTRAVENOUS DAILY PRN
Status: CANCELLED | OUTPATIENT
Start: 2022-06-28

## 2022-06-28 RX ORDER — DIPHENHYDRAMINE HYDROCHLORIDE 50 MG/ML
25 INJECTION INTRAMUSCULAR; INTRAVENOUS EVERY 6 HOURS PRN
Status: CANCELLED | OUTPATIENT
Start: 2022-06-28

## 2022-06-28 RX ORDER — HYDROMORPHONE HYDROCHLORIDE 2 MG/ML
0.2 INJECTION, SOLUTION INTRAMUSCULAR; INTRAVENOUS; SUBCUTANEOUS EVERY 5 MIN PRN
Status: CANCELLED | OUTPATIENT
Start: 2022-06-28

## 2022-06-28 RX ORDER — IPRATROPIUM BROMIDE AND ALBUTEROL SULFATE 2.5; .5 MG/3ML; MG/3ML
3 SOLUTION RESPIRATORY (INHALATION)
Status: CANCELLED | OUTPATIENT
Start: 2022-06-28

## 2022-06-28 NOTE — ANESTHESIA PREPROCEDURE EVALUATION
Adarelis Juma    Procedure(s): Placement, Mediport     Review of patient's allergies indicates:  No Known Allergies    Current Outpatient Medications   Medication Instructions    acyclovir (ZOVIRAX) 400 mg, Oral, Daily    cyanocobalamin 2,000 mcg, Oral, Daily    DULoxetine (CYMBALTA) 60 MG capsule   See Instructions, TAKE 1 CAPSULE BY MOUTH DAILY, # 90 cap(s), 1 Refill(s), [id number], 155, cm, Height/Length Dosing, 02/22/21 13:16:00 CST, 79.83, kg, Weight Dosing, 02/22/21 13:16:00 CST    DULoxetine (CYMBALTA) 120 mg, Oral, Daily    fluticasone propionate (FLONASE) 50 mcg/actuation nasal spray 1 spray, Each Nostril, Daily    hyoscyamine (LEVSIN/SL) 0.125 mg Subl DISSOLVE 1 TABLET UNDER THE TONGUE EVERY 6 HOURS AS NEEDED FOR CRAMPING OR DIARRHEA    lisinopriL 10 mg, Oral, Daily    loratadine (CLARITIN) 10 mg, Oral, Daily    multivitamin (THERAGRAN) per tablet 1 tablet, Oral, Daily    ondansetron (ZOFRAN-ODT) 8 MG TbDL DISSOLVE ONE TABLET BY MOUTH EVERY 8 HOURS AS NEEDED FORNAUSEA AND VOMITING    pyridoxine (vitamin B6) (B-6) 50 mg, Oral, Daily    spironolactone (ALDACTONE) 100 mg, Oral, Every morning    VIENVA 0.1-20 mg-mcg per tablet 1 tablet, Oral, Daily    vitamin E 400 Units, Oral, Daily       Placement, Mediport;PA INSERT TUNNELED CV CATH WITH PORT [3*    Past Medical History:   Diagnosis Date    Anxiety     Depression     Hypertension        Past Surgical History:   Procedure Laterality Date    BACK SURGERY      CHOLECYSTECTOMY      ENDOSCOPIC RELEASE OF BOTH CARPAL TUNNELS Bilateral     TRIGGER FINGER RELEASE Right    PMH includes Migraine HAs    Social History     Tobacco Use    Smoking status: Never Smoker    Smokeless tobacco: Never Used   Substance Use Topics    Alcohol use: Yes     Comment: Occasionally     X-Ray Chest 1 View    Result Date: 6/23/2022  EXAMINATION: XR CHEST 1 VIEW CLINICAL HISTORY: pre op breast;, Malignant neoplasm of central portion of  right female breast. FINDINGS: No alveolar consolidation, effusion, or pneumothorax is seen.   The thoracic aorta is normal  cardiac silhouette, central pulmonary vessels and mediastinum are normal in size and are grossly unremarkable.   visualized osseous structures are grossly unremarkable.     No acute chest disease is identified. Electronically signed by: Inder Alicea Date:    06/23/2022 Time:    12:42    Lab Results   Component Value Date    WBC 9.1 06/23/2022    HGB 14.6 06/23/2022    HCT 43.4 06/23/2022    MCV 88.6 06/23/2022     06/23/2022      BMP  Lab Results   Component Value Date     06/23/2022    K 4.8 06/23/2022    CO2 26 06/23/2022    BUN 7.9 06/23/2022    CREATININE 0.69 06/23/2022    CALCIUM 9.7 06/23/2022    EGFRNONAA >60 06/23/2022          Pre-op Assessment    I have reviewed the Patient Summary Reports.    I have reviewed the NPO Status.   I have reviewed the Medications.     Review of Systems  Anesthesia Hx:  Hx of Anesthetic complications (PONV)  Denies Family Hx of Anesthesia complications.   Denies Personal Hx of Anesthesia complications.   Social:  Non-Smoker    Cardiovascular:   Exercise tolerance: good Hypertension  Denies Angina.  Denies Orthopnea.  Denies PND.  Denies DONAHUE.  Functional Capacity good / => 4 METS    Musculoskeletal:  Musculoskeletal Normal    Neurological:   Denies TIA. Denies CVA.    Psych:   Psychiatric History anxiety depression          Physical Exam  General: Well nourished, Alert and Oriented    Airway:  Mallampati: III   Mouth Opening: Normal  TM Distance: Normal  Tongue: Normal  Neck ROM: Normal ROM    Dental:  Intact    Chest/Lungs:  Clear to auscultation    Heart:  Rate: Normal  Rhythm: Regular Rhythm  No pretibial edema  No carotid bruits      Anesthesia Plan  Type of Anesthesia, risks & benefits discussed:    Anesthesia Type: Gen Natural Airway  Intra-op Monitoring Plan: Standard ASA Monitors  Post Op Pain Control Plan: IV/PO Opioids  PRN  Induction:  IV  Informed Consent: Informed consent signed with the Patient and all parties understand the risks and agree with anesthesia plan.  All questions answered. Patient consented to blood products? No  ASA Score: 3  Day of Surgery Review of History & Physical: H&P Update referred to the surgeon/provider.  Anesthesia Plan Notes: GA TIVA    Ready For Surgery From Anesthesia Perspective.     .

## 2022-07-01 ENCOUNTER — HOSPITAL ENCOUNTER (OUTPATIENT)
Facility: HOSPITAL | Age: 42
Discharge: HOME | End: 2022-07-01
Attending: SURGERY | Admitting: SURGERY
Payer: COMMERCIAL

## 2022-07-01 ENCOUNTER — ANESTHESIA (OUTPATIENT)
Dept: SURGERY | Facility: HOSPITAL | Age: 42
End: 2022-07-01
Payer: COMMERCIAL

## 2022-07-01 DIAGNOSIS — Z17.1 MALIGNANT NEOPLASM OF CENTRAL PORTION OF RIGHT BREAST IN FEMALE, ESTROGEN RECEPTOR NEGATIVE: ICD-10-CM

## 2022-07-01 DIAGNOSIS — I87.2 VENOUS INSUFFICIENCY: ICD-10-CM

## 2022-07-01 DIAGNOSIS — C50.111 MALIGNANT NEOPLASM OF CENTRAL PORTION OF RIGHT BREAST IN FEMALE, ESTROGEN RECEPTOR NEGATIVE: ICD-10-CM

## 2022-07-01 LAB
B-HCG UR QL: NEGATIVE
CTP QC/QA: YES

## 2022-07-01 PROCEDURE — 77001 CHG FLUOROGUIDE CNTRL VEN ACCESS,PLACE,REPLACE,REMOVE: ICD-10-PCS | Mod: 26,,, | Performed by: SURGERY

## 2022-07-01 PROCEDURE — 81025 URINE PREGNANCY TEST: CPT | Performed by: ANESTHESIOLOGY

## 2022-07-01 PROCEDURE — 37000009 HC ANESTHESIA EA ADD 15 MINS: Performed by: SURGERY

## 2022-07-01 PROCEDURE — 36000706: Performed by: SURGERY

## 2022-07-01 PROCEDURE — 37000008 HC ANESTHESIA 1ST 15 MINUTES: Performed by: SURGERY

## 2022-07-01 PROCEDURE — 63600175 PHARM REV CODE 636 W HCPCS: Performed by: SURGERY

## 2022-07-01 PROCEDURE — 25000003 PHARM REV CODE 250

## 2022-07-01 PROCEDURE — 36561 INSERT TUNNELED CV CATH: CPT | Mod: AS,LT,, | Performed by: PHYSICIAN ASSISTANT

## 2022-07-01 PROCEDURE — 71000016 HC POSTOP RECOV ADDL HR: Performed by: SURGERY

## 2022-07-01 PROCEDURE — 36561 PR INSERT TUNNELED CV CATH WITH PORT: ICD-10-PCS | Mod: LT,,, | Performed by: SURGERY

## 2022-07-01 PROCEDURE — 36561 INSERT TUNNELED CV CATH: CPT | Mod: LT,,, | Performed by: SURGERY

## 2022-07-01 PROCEDURE — 25000003 PHARM REV CODE 250: Performed by: ANESTHESIOLOGY

## 2022-07-01 PROCEDURE — 36561 PR INSERT TUNNELED CV CATH WITH PORT: ICD-10-PCS | Mod: AS,LT,, | Performed by: PHYSICIAN ASSISTANT

## 2022-07-01 PROCEDURE — 36000707: Performed by: SURGERY

## 2022-07-01 PROCEDURE — 71000015 HC POSTOP RECOV 1ST HR: Performed by: SURGERY

## 2022-07-01 PROCEDURE — 63600175 PHARM REV CODE 636 W HCPCS

## 2022-07-01 PROCEDURE — 77001 FLUOROGUIDE FOR VEIN DEVICE: CPT | Mod: 26,,, | Performed by: SURGERY

## 2022-07-01 PROCEDURE — C1788 PORT, INDWELLING, IMP: HCPCS | Performed by: SURGERY

## 2022-07-01 PROCEDURE — 25000003 PHARM REV CODE 250: Performed by: SURGERY

## 2022-07-01 DEVICE — PORT POWER SLIM: Type: IMPLANTABLE DEVICE | Site: CHEST | Status: FUNCTIONAL

## 2022-07-01 RX ORDER — SCOLOPAMINE TRANSDERMAL SYSTEM 1 MG/1
1 PATCH, EXTENDED RELEASE TRANSDERMAL
Status: ACTIVE | OUTPATIENT
Start: 2022-07-01

## 2022-07-01 RX ORDER — HEPARIN SODIUM 5000 [USP'U]/ML
INJECTION, SOLUTION INTRAVENOUS; SUBCUTANEOUS
Status: DISCONTINUED | OUTPATIENT
Start: 2022-07-01 | End: 2022-07-01 | Stop reason: HOSPADM

## 2022-07-01 RX ORDER — CEFAZOLIN SODIUM 1 G/3ML
INJECTION, POWDER, FOR SOLUTION INTRAMUSCULAR; INTRAVENOUS
Status: DISCONTINUED
Start: 2022-07-01 | End: 2022-07-01 | Stop reason: HOSPADM

## 2022-07-01 RX ORDER — LIDOCAINE HYDROCHLORIDE 10 MG/ML
1 INJECTION, SOLUTION EPIDURAL; INFILTRATION; INTRACAUDAL; PERINEURAL ONCE
Status: COMPLETED | OUTPATIENT
Start: 2022-07-01 | End: 2022-07-01

## 2022-07-01 RX ORDER — MIDAZOLAM HYDROCHLORIDE 1 MG/ML
2 INJECTION INTRAMUSCULAR; INTRAVENOUS
Status: ACTIVE | OUTPATIENT
Start: 2022-07-01 | End: 2022-07-01

## 2022-07-01 RX ORDER — HEPARIN SODIUM 5000 [USP'U]/ML
INJECTION, SOLUTION INTRAVENOUS; SUBCUTANEOUS
Status: DISCONTINUED
Start: 2022-07-01 | End: 2022-07-01 | Stop reason: HOSPADM

## 2022-07-01 RX ORDER — PROPOFOL 10 MG/ML
VIAL (ML) INTRAVENOUS CONTINUOUS PRN
Status: DISCONTINUED | OUTPATIENT
Start: 2022-07-01 | End: 2022-07-01

## 2022-07-01 RX ORDER — TRAMADOL HYDROCHLORIDE 50 MG/1
50 TABLET ORAL EVERY 4 HOURS PRN
Status: DISCONTINUED | OUTPATIENT
Start: 2022-07-01 | End: 2022-07-01 | Stop reason: HOSPADM

## 2022-07-01 RX ORDER — SODIUM CHLORIDE, SODIUM GLUCONATE, SODIUM ACETATE, POTASSIUM CHLORIDE AND MAGNESIUM CHLORIDE 30; 37; 368; 526; 502 MG/100ML; MG/100ML; MG/100ML; MG/100ML; MG/100ML
1000 INJECTION, SOLUTION INTRAVENOUS CONTINUOUS
Status: ACTIVE | OUTPATIENT
Start: 2022-07-01 | End: 2022-07-31

## 2022-07-01 RX ORDER — FENTANYL CITRATE 50 UG/ML
INJECTION, SOLUTION INTRAMUSCULAR; INTRAVENOUS
Status: DISCONTINUED | OUTPATIENT
Start: 2022-07-01 | End: 2022-07-01

## 2022-07-01 RX ORDER — LIDOCAINE HYDROCHLORIDE 10 MG/ML
INJECTION INFILTRATION; PERINEURAL
Status: DISCONTINUED
Start: 2022-07-01 | End: 2022-07-01 | Stop reason: HOSPADM

## 2022-07-01 RX ORDER — CEFAZOLIN SODIUM 2 G/50ML
2 SOLUTION INTRAVENOUS
Status: COMPLETED | OUTPATIENT
Start: 2022-07-01 | End: 2022-07-01

## 2022-07-01 RX ORDER — SCOLOPAMINE TRANSDERMAL SYSTEM 1 MG/1
PATCH, EXTENDED RELEASE TRANSDERMAL
Status: DISCONTINUED
Start: 2022-07-01 | End: 2022-07-01 | Stop reason: HOSPADM

## 2022-07-01 RX ORDER — PROPOFOL 10 MG/ML
VIAL (ML) INTRAVENOUS
Status: DISCONTINUED | OUTPATIENT
Start: 2022-07-01 | End: 2022-07-01

## 2022-07-01 RX ORDER — DEXAMETHASONE SODIUM PHOSPHATE 4 MG/ML
INJECTION, SOLUTION INTRA-ARTICULAR; INTRALESIONAL; INTRAMUSCULAR; INTRAVENOUS; SOFT TISSUE
Status: DISCONTINUED | OUTPATIENT
Start: 2022-07-01 | End: 2022-07-01

## 2022-07-01 RX ORDER — ONDANSETRON 2 MG/ML
4 INJECTION INTRAMUSCULAR; INTRAVENOUS EVERY 12 HOURS PRN
Status: DISCONTINUED | OUTPATIENT
Start: 2022-07-01 | End: 2022-07-01 | Stop reason: HOSPADM

## 2022-07-01 RX ORDER — ONDANSETRON 2 MG/ML
INJECTION INTRAMUSCULAR; INTRAVENOUS
Status: DISCONTINUED | OUTPATIENT
Start: 2022-07-01 | End: 2022-07-01

## 2022-07-01 RX ORDER — BUPIVACAINE HYDROCHLORIDE 2.5 MG/ML
INJECTION, SOLUTION EPIDURAL; INFILTRATION; INTRACAUDAL
Status: DISCONTINUED
Start: 2022-07-01 | End: 2022-07-01 | Stop reason: HOSPADM

## 2022-07-01 RX ORDER — LIDOCAINE HYDROCHLORIDE 10 MG/ML
INJECTION INFILTRATION; PERINEURAL
Status: DISCONTINUED | OUTPATIENT
Start: 2022-07-01 | End: 2022-07-01 | Stop reason: HOSPADM

## 2022-07-01 RX ORDER — BUPIVACAINE HYDROCHLORIDE 2.5 MG/ML
INJECTION, SOLUTION EPIDURAL; INFILTRATION; INTRACAUDAL
Status: DISCONTINUED | OUTPATIENT
Start: 2022-07-01 | End: 2022-07-01 | Stop reason: HOSPADM

## 2022-07-01 RX ORDER — MIDAZOLAM HYDROCHLORIDE 1 MG/ML
INJECTION INTRAMUSCULAR; INTRAVENOUS
Status: COMPLETED
Start: 2022-07-01 | End: 2022-07-01

## 2022-07-01 RX ADMIN — DEXAMETHASONE SODIUM PHOSPHATE 4 MG: 4 INJECTION, SOLUTION INTRA-ARTICULAR; INTRALESIONAL; INTRAMUSCULAR; INTRAVENOUS; SOFT TISSUE at 07:07

## 2022-07-01 RX ADMIN — SODIUM CHLORIDE, POTASSIUM CHLORIDE, SODIUM LACTATE AND CALCIUM CHLORIDE 1000 ML: 600; 310; 30; 20 INJECTION, SOLUTION INTRAVENOUS at 06:07

## 2022-07-01 RX ADMIN — CEFAZOLIN SODIUM 2 G: 2 SOLUTION INTRAVENOUS at 07:07

## 2022-07-01 RX ADMIN — SODIUM CHLORIDE, POTASSIUM CHLORIDE, SODIUM LACTATE AND CALCIUM CHLORIDE: 600; 310; 30; 20 INJECTION, SOLUTION INTRAVENOUS at 07:07

## 2022-07-01 RX ADMIN — PROPOFOL 50 MG: 10 INJECTION, EMULSION INTRAVENOUS at 07:07

## 2022-07-01 RX ADMIN — FENTANYL CITRATE 50 MCG: 50 INJECTION, SOLUTION INTRAMUSCULAR; INTRAVENOUS at 07:07

## 2022-07-01 RX ADMIN — PROPOFOL 50 MCG/KG/MIN: 10 INJECTION, EMULSION INTRAVENOUS at 07:07

## 2022-07-01 RX ADMIN — SCOLOPAMINE TRANSDERMAL SYSTEM 1 PATCH: 1 PATCH, EXTENDED RELEASE TRANSDERMAL at 06:07

## 2022-07-01 RX ADMIN — MIDAZOLAM HYDROCHLORIDE 2 MG: 1 INJECTION INTRAMUSCULAR; INTRAVENOUS at 07:07

## 2022-07-01 RX ADMIN — LIDOCAINE HYDROCHLORIDE 40 MG: 10 INJECTION, SOLUTION EPIDURAL; INFILTRATION; INTRACAUDAL; PERINEURAL at 07:07

## 2022-07-01 RX ADMIN — ONDANSETRON 4 MG: 2 INJECTION INTRAMUSCULAR; INTRAVENOUS at 07:07

## 2022-07-01 RX ADMIN — SCOPALAMINE 1 PATCH: 1 PATCH, EXTENDED RELEASE TRANSDERMAL at 06:07

## 2022-07-01 RX ADMIN — MIDAZOLAM 2 MG: 1 INJECTION INTRAMUSCULAR; INTRAVENOUS at 07:07

## 2022-07-01 NOTE — PATIENT INSTRUCTIONS
Patient Education        How to Care for a Portacath   About this topic   A central line catheter is a very long intravenous (IV) line. A portacath or port is a type of central line. A port has 2 main parts, a round disc and a long tube called a catheter. The port and catheter are completely inside your body. The port looks like a small bump under your skin. It is most often in the chest area, but may be in your upper arm or lower abdomen. The long catheter goes under the skin and into a large vein that leads just above your heart.  Some ports can be used to inject dye when you have special tests. This is a power-injectable port. Ask your doctor about your port before you have any tests. Also, your doctor will give you a card that tells information about your port. Keep this with you and share it with your other doctors.  Once this port is in place, the doctor or nurse will use a special needle to get into the disc part. This is called accessing the port. The needle can stay in for a week at a time and will be covered with a clear bandage and taped in place. When the needle is not in the port, you will not have any holes in your skin because the port is under the skin. It can stay in place for months or years until your treatment is no longer needed. This kind of IV is used to draw blood for tests whenever possible to save your other veins from needlesticks.  General   Always wash your hands carefully before you touch your port, needle, tubing, or dressing. Everyone who touches your port or access needle should wash their hands first and wear gloves.       When you are at home, there may be times when your port is still accessed. This means it still has a needle in place.  Do not get your port wet while the needle is in place. Take a sponge bath or cover the area with plastic wrap before you shower.  Make sure your dressing stays clean and dry. This will help keep the needle secure and in place.  If your port is  not accessed, it will just look like a small raised area under the skin.  You can shower, take a bath, or swim in a pool.  You do not need to have a dressing over your port.  Wear clothes that do not rub on your port.  Talk to your doctor about what activities are safe for you.  Flush your port before and after drugs and blood draws. Flush your port at least one time every 4 weeks. Talk to your doctor about what you should use to flush your port with.  Changing the Port Dressing: Keeping the skin clean and changing the dressing as ordered may help to prevent infection. You will always change the dressing if it is wet, loose, or dirty. You or someone in your family can learn how to do this. Others have a nurse come to your home to help with this care.  Gather supplies and place them on a clean work space. Your supplies may come in a kit. You will need clean gloves, sterile gloves, masks, chlorhexidine swab sticks, skin preparation wipes, tape, and sterile dressings.  Always wash your hands well with soap and water before touching the needle and tubing. This will help to avoid spreading germs. Wear a mask when changing the dressing. Also, have the patient wear a mask or keep their head turned away while the dressing is being changed. Wear clean gloves when taking off the old dressing.  Take off the old dressing. Avoid using scissors or sharp tools. They could cut the extension tubing that is connected to the needle. Do not pull on the tubing when taking off the dressing.  Check the site for swelling, drainage, or redness.  Wash your hands again. Put on sterile gloves.  Wash the site with a chlorhexidine swab using a back-and-forth scrubbing motion for 30 seconds. Start at the exit site, wash the tubing if it is staying in, and wash away from the site. Clean the entire area where the new dressing will go, about 4 inches (10 cm). Let the cleaned area air dry for at least 30 seconds or until completely dry. Do not fan,  wave, or blow on the site to help it dry faster.  Your doctor may order a small sponge to go around the needle where it goes into the port. Put this sponge on the skin so it circles the needle. Make sure it is right-side up as marked on the sponge.  Once the cleaned area is dry, use a skin preparation wipe around the port where the new dressing will go. Allow this skin prep to dry completely before putting the dressing on. This protects the skin, helping to prevent irritation from changing the dressing and helps the dressing stick.  Lay the tubing on the skin in a loose [place] if it is long or in a straight line if it is short. It should not lay on top of where the port is in the skin or lay on itself. Put the dressing on all sides of the tubing. Be sure to cover where the needle goes in to the skin. The dressing should be snug but not stretched or it will pull on the skin. Most dressings are clear and will stick to the skin. If it is not sticking, put medical tape on the edges only to hold it in place. If you often have trouble with it sticking, talk to your doctor or nurse about the dressing you use or the way you are changing your dressing. They will help you get the dressing to stick better.  Throw away the old dressing, mask, used swabs, and used wipes.  Remove gloves.  Wash your hands with soap and water.  Accessing and Flushing the Port: The port should be flushed each month with a sterile fluid. It should also be flushed before and after each use. This will help keep the port and catheter from getting blocked.  Wash your hands with soap and water.  Gather supplies and place them on a clean work space. You will need:  Clean gloves  Masks for the person who is accessing the port and one for the patient  Chlorhexidine swab sticks  Alcohol wipes  Special needle to access the port connected to tubing. This needle is bent in the middle and connected to a short piece of clear tubing.  10 mL syringes filled with the  fluid to flush the catheter. Your doctor may order heparin, normal saline, or a special flush for you to use. Your doctor may have you use a smaller amount of fluid to flush the line if the catheter is in a child.  Injection cap  Numbing cream is sometimes used to numb the skin before accessing the port.  Wear a mask when changing the dressing. Also, have the patient wear a mask or keep their head turned away while the dressing is being changed.  Put on clean gloves and take off the old dressing. Throw it away.  Wash your hands with soap and water.  Apply numbing cream on skin over port and wait required time for cream to work.  Wipe off cream with clean gauze.  Now put on sterile gloves. Connect the injection cap to the tubing. Fill injection cap, tubing, and needle with flush solution. Clamp tubing and leave the syringe attached to the tubing.  Clean skin over port with chlorhexidine swabs and allow to dry. Do not fan, wave, or blow on the site to help it dry faster.  Hold the edges of the port in place with your thumb and index finger of the hand that you do not write with. Do not touch the place where you will insert the needle because it is clean.  Insert the needle at a 90-degree angle from the skin in the center of the port. Push the needle in firmly until it touches the back of the port. Do not push more once you reach the back of the port.  Unclamp the tubing and pull back on the syringe to check for a blood return. You do not need to fill the syringe with blood, just enough into the tubing to check that the blood is there. If there is a blood return, flush slowly and steadily until the syringe is almost empty. Clamp the tubing before you get to the very last amount of flush solution, less than 1 mL in the syringe.  If there is not a blood return, reposition the needle and try again. If there is still no blood return, start over with a new needle. If you are not able to get a blood return with a new needle,  contact your doctor.  Take off syringe.  Cover needle and tubing with a clear dressing.  Throw out any used swabs, wipes, or materials.  Wash your hands with soap and water.  Flushing the Port and Taking Out the Needle (Deaccessing)   Wash your hands with soap and water.  Gather supplies and place them on a clean work space. You will need:  Masks for the person who is accessing the port and one for the patient  Clean gloves  Alcohol wipe  10 mL syringes filled with the fluid to flush the catheter. Your doctor may order heparin, normal saline, or a special flush solution for you to use. Your doctor may have you use a smaller amount of fluid to flush the line if the catheter is in a child.  Wash your hands with soap and water.  Wearing clean gloves, scrub the injection cap with an alcohol wipe for 15 seconds and let dry. Do not fan, wave, or blow on the site to help it dry faster.  Attach the flush syringe to the injection cap. Unclamp the tubing and pull back on the syringe to check for a blood return. If there is a blood return, flush slowly and steadily until the syringe is almost empty. Clamp the tubing before you get to the very last bit of flush solution, less than 1 mL, and remove the syringe.  If there is not a blood return, reposition the needle and try again. If there is still no blood return, start over with a new needle. If you are not able to get a blood return with a new needle, contact your doctor.  After flushing the port, put on the mask and remove the clear dressing. Hold the port steady with your thumb and index fingers of one hand. With your other hand, remove the port needle. Take care that you do not stick your fingers or skin with the needle. Most needles have a safety catch or cover. Your nurse will show you how to cover the needle when it is out.  Dispose of the needle in the proper container.  If there is bleeding, put gentle pressure on the site with a sterile gauze until it stops.  Throw  out your mask, used swabs, wipes, or materials.  Wash your hands with soap and water.  What problems could happen?   Infection  Bruising  Blood clot  Scar inside the large blood vessel if the port is in for a long time  Catheter moves to wrong position inside the vein  Fluid leaks into the surrounding skin and tissue  When do I need to call the doctor?   Fever of 100.4°F (38°C) or higher  Problems with your port site like:  Site starts bleeding and does not stop with gentle pressure  Site becomes more red, or the skin over or around your port breaks open  Rash, bumps, itching, or irritation where the dressing goes onto the skin  Drainage or pus from the port site  Problems with the port like:  Your port seems to have moved under your skin  Not able to get the needle into the port  Pain, irritation, or swelling when you flush the port  Not able to get the drugs or flush solution through the port  Not able to get a blood return from your port  You have any concerns about your port  Sudden shortness of breath or chest pain  Pain in your arm or chest when you flush the port  Swishing sound in one of your ears when you flush the port  Swelling in your neck, jaw, face, or upper chest  Your veins in your face, upper arms, neck, or chest are swelling up or sticking out  Change in skin color on your upper chest, neck, jaw, or face  Teach Back: Helping You Understand   The Teach Back Method helps you understand the information we are giving you. After you talk with the staff, tell them in your own words what you learned. This helps to make sure the staff has described each thing clearly. It also helps to explain things that may have been confusing. Before going home, make sure you can do these:  I can tell you about my condition.  I can tell you how I will take care of my port.  I can tell you what I will do if I have fever, redness, or drainage from the port site.  Where can I learn more?   American Society of Clinical  Oncology  https://www.cancer.net/navigating-cancer-care/how-cancer-treated/chemotherapy/catheters-and-ports-cancer-treatment   [facility] Support  https://www.[facility].org.uk/information-and-support/treating/chemotherapy/being-treated-with-chemotherapy/implantable-ports.html#62191   Last Reviewed Date   2021-09-27  Consumer Information Use and Disclaimer   This information is not specific medical advice and does not replace information you receive from your health care provider. This is only a brief summary of general information. It does NOT include all information about conditions, illnesses, injuries, tests, procedures, treatments, therapies, discharge instructions or life-style choices that may apply to you. You must talk with your health care provider for complete information about your health and treatment options. This information should not be used to decide whether or not to accept your health care providers advice, instructions or recommendations. Only your health care provider has the knowledge and training to provide advice that is right for you.  Copyright   Copyright © 2021 [employer] and its affiliates and/or licensors. All rights reserved.

## 2022-07-01 NOTE — PLAN OF CARE
Problem: Pain Acute  Goal: Acceptable Pain Control and Functional Ability  Outcome: Met  Intervention: Prevent or Manage Pain  Flowsheets (Taken 7/1/2022 0882)  Sleep/Rest Enhancement:   family presence promoted   noise level reduced  Sensory Stimulation Regulation:   care clustered   quiet environment promoted  Bowel Elimination Promotion:   adequate fluid intake promoted   ambulation promoted  Medication Review/Management: medications reviewed     Problem: Fall Injury Risk  Goal: Absence of Fall and Fall-Related Injury  Outcome: Met  Intervention: Identify and Manage Contributors  Flowsheets (Taken 7/1/2022 2223)  Self-Care Promotion: independence encouraged  Medication Review/Management: medications reviewed

## 2022-07-01 NOTE — INTERVAL H&P NOTE
The patient has been examined and the H&P has been reviewed:    I concur with the findings and no changes have occurred since H&P was written.    Anesthesia/Surgery risks, benefits and alternative options discussed and understood by patient/family.

## 2022-07-01 NOTE — ANESTHESIA POSTPROCEDURE EVALUATION
Anesthesia Post Evaluation    Patient: Rob Dennison    Procedure(s) Performed: Procedure(s) (LRB):  Placement, Mediport (N/A)    Final Anesthesia Type: MAC      Patient location during evaluation: OPS  Patient participation: Yes- Able to Participate  Level of consciousness: awake and alert  Post-procedure vital signs: reviewed and stable  Pain management: adequate  Airway patency: patent    PONV status at discharge: No PONV  Anesthetic complications: no      Cardiovascular status: blood pressure returned to baseline  Respiratory status: unassisted and room air  Hydration status: euvolemic  Follow-up not needed.          Vitals Value Taken Time   /85 07/01/22 0755   Temp 36.6 °C (97.9 °F) 07/01/22 0754   Pulse 84 07/01/22 0754   Resp 16 07/01/22 0755   SpO2 99 % 07/01/22 0754         No case tracking events are documented in the log.      Pain/Jayden Score: No data recorded

## 2022-07-01 NOTE — BRIEF OP NOTE
[facility]  Brief Operative Note     SUMMARY     Surgery Date: 7/1/2022     Surgeon: Genie Josue MD    Assist: Shavon Rizzo PA-C    Pre-op Diagnosis:     * Malignant neoplasm of central portion of right breast in female, estrogen receptor negative [C50.111, Z17.1]     * Venous insufficiency [I87.2]    Post-op Diagnosis:  Post-Op Diagnosis Codes:     * Malignant neoplasm of central portion of right breast in female, estrogen receptor negative [C50.111, Z17.1]     * Venous insufficiency [I87.2]    Procedure(s) (LRB):  Placement, Mediport (N/A) left subclavian vein under fluoroscopic-guidance    Anesthesia: General    Findings/Key Components: Left subclavian vein Mediport Placement    Estimated Blood Loss: 1 ml         Specimens:   Specimen (24h ago, onward)            None          Discharge Note    SUMMARY     Admit Date: 7/1/2022    Discharge Date and Time:  07/01/2022 7:57 AM    Hospital Course (synopsis of major diagnoses, care, treatment, and services provided during the course of the hospital stay): She is status post left subclavian vein Mediport placement     Final Diagnosis: Post-Op Diagnosis Codes:     * Malignant neoplasm of central portion of right breast in female, estrogen receptor negative [C50.111, Z17.1]     * Venous insufficiency [I87.2]    Disposition: Home or Self Care    Follow Up/Patient Instructions:     Medications:  Reconciled Home Medications:      Medication List      CONTINUE taking these medications    acyclovir 400 MG tablet  Commonly known as: ZOVIRAX  Take 400 mg by mouth once daily.     cyanocobalamin 2000 MCG tablet  Take 2,000 mcg by mouth once daily.     * DULoxetine 60 MG capsule  Commonly known as: CYMBALTA  See Instructions, TAKE 1 CAPSULE BY MOUTH DAILY, # 90 cap(s), 1 Refill(s), [id number], 155, cm, Height/Length Dosing, 02/22/21 13:16:00 CST, 79.83, kg, Weight Dosing, 02/22/21 13:16:00 CST     * DULOXETINE ORAL  Take  120 mg by mouth once daily.     fluticasone propionate 50 mcg/actuation nasal spray  Commonly known as: FLONASE  1 spray by Each Nostril route once daily.     hyoscyamine 0.125 mg Subl  Commonly known as: LEVSIN/SL  DISSOLVE 1 TABLET UNDER THE TONGUE EVERY 6 HOURS AS NEEDED FOR CRAMPING OR DIARRHEA     lisinopriL 10 MG tablet  Take 10 mg by mouth once daily.     loratadine 10 mg tablet  Commonly known as: CLARITIN  Take 10 mg by mouth once daily.     multivitamin per tablet  Commonly known as: THERAGRAN  Take 1 tablet by mouth once daily.     ondansetron 8 MG Tbdl  Commonly known as: ZOFRAN-ODT  8 mg.     pyridoxine (vitamin B6) 100 MG Tab  Commonly known as: B-6  Take 50 mg by mouth once daily.     spironolactone 100 MG tablet  Commonly known as: ALDACTONE  Take 100 mg by mouth every morning.     vitamin E 400 UNIT capsule  Take 400 Units by mouth once daily.         * This list has 2 medication(s) that are the same as other medications prescribed for you. Read the directions carefully, and ask your doctor or other care provider to review them with you.              Discharge Procedure Orders   Diet general     Ice to affected area     Lifting restrictions     Remove dressing in 24 hours   Order Comments: Leave glue and steri strips until clinic visit     Call MD for:  temperature >100.4     Call MD for:  persistent nausea and vomiting     Call MD for:  severe uncontrolled pain     Call MD for:  difficulty breathing, headache or visual disturbances     Call MD for:  redness, tenderness, or signs of infection (pain, swelling, redness, odor or green/yellow discharge around incision site)     Call MD for:  hives     Call MD for:  persistent dizziness or light-headedness

## 2022-07-01 NOTE — DISCHARGE INSTRUCTIONS
Mediport/Portacath Discharge Instructions     About this topic   A portacath or port is a type of central line. A central line catheter is a very long intravenous (IV) line. A port has two main parts: A round disc and long tube called a catheter. The port looks like a small bump under your skin. It is most often in the chest area, but may be in your upper arm or lower abdomen. The long catheter tube goes under the skin and into a large vein near your heart. Once you have a port, you do not need to have IV needles stuck into your arms to have lab tests drawn or fluids given.    Once this port is in place, the doctor or nurse will use a special needle to get into the disc part. This is called accessing the port. The needle can stay in for a week at a time and will be covered with a clear bandage and taped in place. When the needle is not in the port, you will not have any holes in your skin because the port is under the skin. It can stay in place for months or years until your treatment is no longer needed. Sometimes, this kind of IV is used to draw blood for tests.  Your doctor can give you any kind of fluids, drugs, or blood products through your port. They will not bother your veins because the port goes into such a large vein in your body. You can also have all kinds of blood draws through your port. If your port is not accessed, you will just see a small bump under your skin. There will be nothing that is sticking out from your skin.         What care is needed at home?   This is what you will need to know:  You can take the bandage off in 24 hours.  Wash your hands before and after touching your dressing and incision. You may take a bath or shower after removing the bandage. Wash your incision with soap and water, pat dry, then leave open to air. The skin glue (dermabond) will peel off within 1-2 weeks. Do not peel or pick at it.  Check your incision daily for signs and symptoms of infection: redness, drainage  or pus, foul odor, warmth, and swelling.   No lifting anything over 10 pounds (4.5 kg) until the doctor say otherwise.  Gradually go back to your normal activities like work, driving, or sex.  You may use an ice pack for comfort and swelling. Place ice pack over incision with a towel between your skin and the ice pack. Leave on 15 minutes and take off. Repeat 3-4 times during the day.   How to care for your port.  Wash your hands with soap and water for at least 30 seconds before touching the needle or port. Do not allow anyone to touch your needle or port with dirty hands or if they have not had special training.  If there is no needle in it, the port will be fully under your skin and you will be able to do your normal activities.  Your port will need to be flushed so a clot does not form at the end. A nurse will flush the port with saline and then heparin to avoid having a clot form inside or at the end of the tube. The nurse will do this each time you are finished getting fluid or drugs, and when the it's time to pull the needle out of your skin. If you are allergic to heparin or had a bad side effect from it, talk to your doctor.  You will get an ID card that will tell other people that you have an implanted port. It is very important to keep this card with you at all times to let doctors and nurses know what kind of port you have.  Will physical activity be limited?   You may have to limit your activity when you have a port. Avoid activities that make you tired. Avoid sports where you might get hit in the port. Talk to your doctor about the right amount of activity for you.  Avoid activities that pull on your arms or chest.  No pulling with arm on the side of the mediport and not picking up heavy objects with the arm on the side of the port.  What problems could happen?   Infection  Bruising  Blood clot  Scar inside the large blood vessel if the port is in for a long time  When do I need to call the doctor?    Signs of infection. These include a fever of 100.4°F (38°C) or higher, chills.  Signs of wound infection. These include swelling, redness, warmth around the wound; too much pain when touched; yellowish, greenish, or bloody discharge; foul smell coming from the cut site; cut site opens up.  If you hear a rushing sound in your ear when you flush your port.  You have concerns about your port.  Problems with the port like:  Your port seems to have moved under your skin  Not able to get the needle into the port  Pain, irritation, or swelling when you flush the port  Not able to get the drugs or flush solution through the port  Not able to get a blood return from your port  You have any concerns about your port  Problems with your port site like:  Site starts bleeding and does not stop with gentle pressure  Site becomes more red, or the skin over or around your port breaks open  Rash, bumps, itching, or irritation where the dressing goes onto the skin  Drainage or pus from the port site.  Sudden shortness of breath or chest pain  Pain in your arm or chest when you flush the port  Swelling in your neck, jaw, face, or upper chest  Your veins in your face, upper arms, neck, or chest are swelling up or sticking out  Change in skin color on your upper chest, neck, jaw, or face

## 2022-07-01 NOTE — OP NOTE
DATE OF PROCEDURE: 7/1/2022    SURGEON: Genie Josue M.D.    ASSISTANT:  Shavon Rizzo PA-C    PREOPERATIVE DIAGNOSIS:   * Malignant neoplasm of central portion of right breast in female, estrogen receptor negative [C50.111, Z17.1]     * Venous insufficiency [I87.2]     POSTOPERATIVE DIAGNOSIS: Post-Op Diagnosis Codes:     * Malignant neoplasm of central portion of right breast in female, estrogen receptor negative [C50.111, Z17.1]     * Venous insufficiency [I87.2]     ANESTHESIA: General Anesthesiologist: Sridhar Mao MD  CRNA: Laura Reyes CRNA     PROCEDURES PERFORMED:   Placement, Mediport:  Left subclavian vein under fluoroscopic-guidance      PROCEDURE IN DETAIL:   She is in need of adjuvant chemotherapy and has venous insufficiency. I explained to her that this would be an outpatient procedure with local anesthesia and sedation. The port would be placed under her skin with a catheter which runs into the major vein in her neck or chest. I explained to her my initial access site would be the subclavian vein beneath the clavicle on the left side. If not successful, then the second option would be to do another incision in the neck to access the internal jugular vein. The risks and complications of pain, bleeding, infection, blood clot, scarring, the inability to access the port, clogging of the port, and pneumo/hemothorax with possible chest tube or additional surgery were all explained in detail. Informed consent was obtained.    The patient was then brought to the operating room and placed supine on the operative table. Sedation anesthesia was initiated. The skin of the Left and Right neck, chest, and shoulder were prepped and draped in standard sterile surgical fashion. A time-out was completed verifying correct patient, procedure, site, positioning and equipment prior to beginning the procedure.     The Mediport placement  The skin of the Left and Right neck, chest, and shoulder were  prepped and draped in standard sterile surgical fashion. An incision was planned of the left infraclavicular area about 2 fingerbreadths below the clavicle. The incision was made with a 15-blade. The subcutaneous tissue was dissected using electrocautery. Hemostasis was checked and maintained. Using Seldinger technique the left subclavian vein was assessed. A guide-wire was then introduced. Using the C-Arm the positioning of the wire was checked and determine to be in the appropriate location. A sheath was then inserted over the guide-wire and the inner cannula and guide-wire were both removed. The catheter which had already been flushed and checked was then inserted into the subclavian vein. The C-Arm was then used to confirm and adjust the catheter so that it was positioned in the superior vena cava. The length of the catheter was measure at 22 cm from the entry point.    A subcutaneous pocket was then created for the Mediport. The catheter was cut and attached to the Mediport. The port was flushed and checked with Heparinized solution. The port was placed into the pocket. The Mediport was checked and flushed with heparinized solution. The subcutaneous tissues were closed with 3-0 Monocryl and the skin closed with running 4-0 Monocryl subcuticular stitches. Dermabond was applied followed by sterile dressings.     Post-Op CXR reviewed    Significant Surgical Tasks Conducted by the Assistant(s), if Applicable: The skilled assistance of the Physician Assistant, Shavon Rizzo PA-C, was necessary for the successful completion of this case. She was essential for proper positioning of the patient, manipulation of instruments, proper exposure, manipulation of tissue, and wound closure.       ESTIMATED BLOOD LOSS: 1 ml    Implants:   Implant Name Type Inv. Item Serial No.  Lot No. LRB No. Used Action   PORT POWER SLIM - [id number]  PORT POWER SLIM  [employer] [id number] Left 1 Implanted       Specimens:    Specimen (24h ago, onward)            None                  Condition: Good    Disposition: PACU - hemodynamically stable.    Attestation: I performed the procedure.

## 2022-07-02 VITALS
BODY MASS INDEX: 33.99 KG/M2 | WEIGHT: 173.13 LBS | SYSTOLIC BLOOD PRESSURE: 123 MMHG | TEMPERATURE: 98 F | OXYGEN SATURATION: 99 % | HEIGHT: 60 IN | HEART RATE: 84 BPM | DIASTOLIC BLOOD PRESSURE: 81 MMHG | RESPIRATION RATE: 16 BRPM

## 2022-07-18 ENCOUNTER — PATIENT MESSAGE (OUTPATIENT)
Dept: SURGERY | Facility: CLINIC | Age: 42
End: 2022-07-18
Payer: COMMERCIAL

## 2022-07-19 ENCOUNTER — PATIENT MESSAGE (OUTPATIENT)
Dept: SURGERY | Facility: CLINIC | Age: 42
End: 2022-07-19
Payer: COMMERCIAL

## 2022-09-26 ENCOUNTER — PATIENT MESSAGE (OUTPATIENT)
Dept: SURGERY | Facility: CLINIC | Age: 42
End: 2022-09-26
Payer: COMMERCIAL

## 2022-11-10 ENCOUNTER — DOCUMENTATION ONLY (OUTPATIENT)
Dept: ADMINISTRATIVE | Facility: HOSPITAL | Age: 42
End: 2022-11-10
Payer: COMMERCIAL

## 2022-12-05 ENCOUNTER — PATIENT MESSAGE (OUTPATIENT)
Dept: SURGERY | Facility: CLINIC | Age: 42
End: 2022-12-05
Payer: COMMERCIAL

## 2022-12-23 ENCOUNTER — PATIENT MESSAGE (OUTPATIENT)
Dept: SURGERY | Facility: CLINIC | Age: 42
End: 2022-12-23
Payer: COMMERCIAL

## 2023-01-09 NOTE — H&P (VIEW-ONLY)
[facility] Breast [facility]urg  Breast Surgical Oncology  Follow-Up Patient Office Visit       Referring Provider: No ref. provider found  PCP: Richa Waller MD   Care Team: No care team member to display No care team member to display     Chief Complaint:   Chief Complaint   Patient presents with    Follow-up     Patient present today for follow up post chemo. Patient is well, no breast pain nor concerns to report.        Subjective:   Treatment History:  1. 2022-Genetic Testing- BRCA 1 mutation  2. Medical Oncology Referral to Dr. Marcy Del Valle  3. 2022 Mediport Placement  4. Neoadjuvant Chemotherapy - AC/Ketruda followed by weekly Taxol x 12 cycles (last dose of Taxol 2022) + Carboplatin/Keytruda (last dose of Keytruda 2022)    Interval History:  1/10/2023 - Rob Dennison presents today to discuss surgical options following neoadjuvant chemotherapy. She has been doing well. She did get hospitalized after her 2nd dose of chemotherapy, but dosage was reduced and was fine. She has some nail bed color changes still. Appetite is still good.     HPI:  Rob Dennison is a 42 y.o. female who presents on 2022 for evaluation of newly diagnosed right  breast cancer.     A detailed patient history was obtained and reviewed. She currently denies any breast issues including rashes, redness, pain, swelling, nipple discharge, or new lumps/masses.     MG breast density: Category C (Heterogeneously dense)      Imagin. 3/9/2022 BL SC MG at [facility] - which revealed a a focal asymmetry in the right breast central to the nipple, posterior depth.    No other significant masses, calcifications, or other findings are seen in either breast.  BIRADS-0; additional imaging needed.     2. 2022 R DG MG/ R US BREAST LIMITED at [facility] - which revealed on R MG at central region posterior depth focal asymmetry recalled from screening mammography. On today's additional mammographic views, there  are three adjacent oval equal density masses with indistinct margins located in the central region far posterior depth. The most posterior of these three masses correlates with the focal asymmetry recalled from screening. No other significant mammographic finding.  On R US, at 7 o'clock to 9 o'clock and subareolar region revealed three adjacent oval hypoechoic masses with indistinct margins at the 9 o'clock  subareolar position, correlating with the three adjacent oval equal density masses with indistinct margins seen on today's mammogram. These three masses measure 0.5 x 0.5 x 0.4 cm, 0.9 x 0.9 x 0.8 cm, and 0.8 x 0.4 x 0.5 cm.   Incidentally seen at 7 o'clock to 8 o'clock  are several subcentimeter cysts of varying size and complication. These are benign. No suspicious right axillary adenopathy. BIRADS-4 suspicious; biopsy recommended.    3. 6/8/2022 Xray Right Shoulder at [facility] - which revealed no acute osseous abnormality.     4. 6/9/2022 BL BREAST MRI at [facility] - which revealed in R breast, 9 o'clock subareolar right breast, posterior depth, there is a 2.7 x 1.5 x 1.6 cm oval mass with non circumscribed margins which demonstrates rapid, washout kinetics (axial image 333).  There is a signal void consistent with a T3 coil shaped HydroMARK clip within this known malignancy.  In the subareolar right breast, anterior to middle depth, there is a 0.7 x 0.3 x 0.7 cm oval, enhancing mass with circumscribed margins (axial image 335).  This mass is 3.5 cm anterior to the known malignancy and 2.8 cm deep to the nipple.   Inferomedial to the known malignancy, in the lower inner quadrant of the right breast, posterior depth, there are 2 areas of clumped, non mass enhancement.  The deeper of the 2 areas of clumped, non mass enhancement spans 0.8 cm (axial image 341) and is 1.4 cm anterior to the pectoralis major muscle.  The more superficial area of clumped, non mass enhancement spans 0.7 cm (axial image 342).   Superomedial to  the known malignancy, in the upper inner quadrant of the right breast, middle depth, there is linear, non mass enhancement spanning 1.7 cm anterior to posterior (axial image 327).  In total the abnormal enhancement spans 7.6 cm anterior to posterior.  There is no skin thickening or nipple retraction.  No axillary or internal mammary lymphadenopathy is identified.  In the L breast, there is no suspicious areas of enhancement or areas of architectural distortion are seen.  There is no skin thickening or nipple retraction.  No axillary or internal mammary lymphadenopathy is identified.  BIRADS-4 suspicious          Pathology:  1. 2022 Ultrasound-guided Core Needle Biopsy Right Breast Mass 9 o'clock Subareolar-  -Invasive ductal carcinoma, grade 3 (measuring 5.0mm)  ER 1%  MO 0%  HER2 Negative  Ki67 63%     OB/GYN History:  Age at Menarche Onset: 10  Menopausal Status: premenopausal, LMP: 2022   Hysterectomy/Oophorectomy: no,n/a  Hormonal birth control (duration): Yes OCP (estrogen/progesterone).   Pregnancy History:   Age at first live birth: 1  Hormone Replacement Therapy: No, none  Patient admits to nipple discharge. Described as bilateral and clear after having first mammogram which has resolved   Patient denies to previous breast biopsy.   Patient denies to a personal history of breast cancer.     Other Relevant History:  Prior thoracic RT: none  Genetic testing: yes  Ashkenazi Worship descent: No     Family History:  Father - Skin cancer possible melanoma at age 80  Paternal Grandfather - colon cancer at age 70     Past History:  Past Medical History:   Diagnosis Date    Anxiety     Breast cancer in female     right    Depression     Hypertension         Past Surgical History:   Procedure Laterality Date    BACK SURGERY      CHOLECYSTECTOMY      ENDOSCOPIC RELEASE OF BOTH CARPAL TUNNELS Bilateral     TRIGGER FINGER RELEASE Right     WISDOM TOOTH EXTRACTION          Social History     Socioeconomic  History    Marital status: Legally    Tobacco Use    Smoking status: Never    Smokeless tobacco: Never   Substance and Sexual Activity    Alcohol use: Yes     Comment: Occasionally    Drug use: Never    Sexual activity: Yes        Body mass index is 33.01 kg/m².     Allergy/Medications:   Review of patient's allergies indicates:   Allergen Reactions    Fosaprepitant Other (See Comments)     Flushing, nausea, abdominal discomfort    Adhesive     Erythromycin base Other (See Comments) and Rash     Fever          Current Outpatient Medications:     acyclovir (ZOVIRAX) 400 MG tablet, Take 400 mg by mouth once daily., Disp: , Rfl:     carvediloL (COREG) 3.125 MG tablet, SMARTSI Tablet(s) By Mouth Morning-Evening, Disp: , Rfl:     cyanocobalamin 2000 MCG tablet, Take 2,000 mcg by mouth once daily., Disp: , Rfl:     DULoxetine (CYMBALTA) 60 MG capsule,  See Instructions, TAKE 1 CAPSULE BY MOUTH DAILY, # 90 cap(s), 1 Refill(s), Pharmacy: [id number], 155, cm, Height/Length Dosing, 21 13:16:00 CST, 79.83, kg, Weight Dosing, 21 13:16:00 CST, Disp: , Rfl:     hydrOXYzine pamoate (VISTARIL) 50 MG Cap, Take by mouth., Disp: , Rfl:     hyoscyamine (LEVSIN/SL) 0.125 mg Subl, DISSOLVE 1 TABLET UNDER THE TONGUE EVERY 6 HOURS AS NEEDED FOR CRAMPING OR DIARRHEA, Disp: , Rfl:     ipratropium (ATROVENT) 21 mcg (0.03 %) nasal spray, SMARTSI Spray(s) Both Nares, Disp: , Rfl:     LIDOCAINE VISCOUS 2 % solution, SMARTSIG:10 Milliliter(s) By Mouth Every 3 Hours PRN, Disp: , Rfl:     LIDOcaine-prilocaine (EMLA) cream, Apply topically as needed., Disp: , Rfl:     lisinopriL 10 MG tablet, Take 10 mg by mouth once daily., Disp: , Rfl:     multivitamin (THERAGRAN) per tablet, Take 1 tablet by mouth once daily., Disp: , Rfl:     OLANZapine (ZYPREXA) 2.5 MG tablet, Take 2.5 mg by mouth every evening., Disp: , Rfl:     ondansetron (ZOFRAN-ODT) 8 MG TbDL, 8 mg., Disp: , Rfl:     pyridoxine, vitamin B6, (B-6)  100 MG Tab, Take 50 mg by mouth once daily., Disp: , Rfl:     vitamin E 400 UNIT capsule, Take 400 Units by mouth once daily., Disp: , Rfl:     duloxetine HCl (DULOXETINE ORAL), Take 120 mg by mouth once daily., Disp: , Rfl:     fluticasone propionate (FLONASE) 50 mcg/actuation nasal spray, 1 spray by Each Nostril route once daily., Disp: , Rfl:     loratadine (CLARITIN) 10 mg tablet, Take 10 mg by mouth once daily., Disp: , Rfl:     spironolactone (ALDACTONE) 100 MG tablet, Take 100 mg by mouth every morning., Disp: , Rfl:   No current facility-administered medications for this visit.    Facility-Administered Medications Ordered in Other Visits:     scopolamine 1.3-1.5 mg (1 mg over 3 days) 1 patch, 1 patch, Transdermal, Q3 Days, Sridahr Mao MD, 1 patch at 07/01/22 0624       Review of Systems:  Constitutional: denies fevers, chills, weight loss  HEENT: denies blurry/double vision, changes in hearing, odynophagia, dysphagia  Respiratory: denies cough, shortness of breath  Cardiovascular: denies palpitations, swelling of the extremities  GI: denies abdominal pain, nausea/vomiting, hematochezia, frequent stools  : denies frequency, dysuria, flank pain, hematuria  Skin: denies new rashes  Neurological: denies muscular/sensory deficiencies, loss of coordination, headaches, memory changes  Endo: denies hair loss/thinning, nervousness, hot flashes, heat/cold intolerance, lumps in the neck area  Heme: denies easy bruising and fatigue  Psychological: denies anxious/depressive moods  Musculoskeletal: denies bony pain, muscle cramps, swollen joints    Objective:     Vitals:  Blood pressure 104/72, pulse 93, temperature 98 °F (36.7 °C), temperature source Oral, resp. rate 18, height 5' (1.524 m), weight 76.7 kg (169 lb), last menstrual period 06/07/2022, SpO2 99 %.      Physical Exam:  General: The patient is awake, alert and oriented times three. The patient is well nourished and in no acute distress.   Neck: There  is no evidence of palpable cervical, supraclavicular or axillary adenopathy. The neck is supple. The thyroid is not enlarged.   Musculoskeletal: The patient has a normal range of motion of her bilateral upper extremities.   Chest: Examination of the chest wall fails to reveal any obvious abnormalities. The lungs are clear to auscultation bilaterally without rales, rhonchi, or wheezing.   Cardiovascular: The heart has a regular rate and rhythm without murmurs, gallops or rubs.   Breast: Examination of right breast fails to reveal any dominant masses or areas of significant focal nodularity. The nipple is everted without evidence of discharge. There is no skin dimpling with movement of the pectoralis. There is no significant skin changes overlying the breast. Examination of the left breast fails to reveal any dominant masses or areas of significant focal nodularity. The nipple is everted without evidence of discharge. There is no skin dimpling with movement of the pectoralis. There is no significant skin changes overlying the breast.  Abdomen: The abdomen is soft, flat, nontender and nondistended with no palpable masses or organomegaly.  Integumentary: no rashes or skin lesions present  Neurologic: cranial nerves intact, no signs of peripheral neurological deficit, motor/sensory function intact    Assessment and Plan:     Encounter Diagnoses   Name Primary?    Malignant neoplasm of central portion of right breast in female, estrogen receptor negative Yes    Breast cancer, BRCA1 positive, right     Encounter for surgical counseling           She is status post NAC chemotherapy and presents for surgical discussion and planning. She is also a BRCA 1 mutation carrier and will need additional discussion and evaluation by OB/GYN    We discussed her imaging and pathology results in detail.    We discussed the need to proceed with bilateral mastectomy and right axillary sentinel lymph node biopsy. The mastectomy procedure was  discussed in detail. I informed her of the complications which include surgical site infections, hematoma or seroma requiring operation, necrosis of nipple-areola and/or mastectomy flaps requiring debridement or hyperbaric therapy, unplanned re-operations, and delay in adjuvant treatments. She does not wish to have reconstruction at this time.    Following this, I also explained to her the procedure of sentinel lymph node mapping and its rationale. We have a 98% success rate identifying the sentinel node. The need to use radioactive dye and blue dye to ensure proper identification of the node was explained. MagTrace brown dye can also be used in the setting of a mastectomy. Also the small but real risk of anaphylactic shock with blue dye or MagTrace dye was discussed. The need to perform completion dissection should the sentinel lymph node be positive was also explained to the patient.     Specifically, we went over the local recurrence rate and survival rates with mastectomy and breast conserving therapy, the small but real risk of lymphedema should we need completion dissection, and the indications for post mastectomy radiation in certain subset of patients.    The need to additional therapy will depend on the final pathology results from surgery.           Plan:     1. Surgery - Bilateral mastectomy and right axillary sentinel lymph node biopsy, MagTrace Injection preop  Tentative Date: 1/30/2023  2. Preop - CBC, CMP,  3. Surgical instructions and information were discussed in detail  4. Recommend follow-up with Gyn and we will assist if she needs.         All of questions were answered    Genie Josue MD  Breast Surgical Oncology     OFFICE VISIT CODING:    Non-face-to-face time included:  Yes Preparing to see the patient such as reviewing the patient record  No Obtaining and reviewing separately obtained history  No Independently interpreting results  Yes Documenting clinical information in electronic health  record  No Ordering appropriate medications  Yes Ordering appropriate tests  Yes Ordering appropriate procedures (including follow-up)  Yes Referring and communicating with other health care professionals (not separately reported)  Yes Care Coordination (not separately reported)    Face-to-face time included:  Yes Performing a medically necessary appropriate history, examination, and/or evaluation  Yes Communicating results to the patient/family/caregiver  Yes Counseling and educating the patient/family/caregiver  Yes Answering patient/family/caregiver questions    Total Time: 45 minutes    Total time includes both face-to-face and non-face-to-face time personally spent by myself on the day of the visit.

## 2023-01-09 NOTE — PROGRESS NOTES
[facility] Breast Surg  Breast Surgical Oncology  Follow-Up Patient Office Visit       Referring Provider: No ref. provider found  PCP: Richa Waller MD   Care Team: No care team member to display No care team member to display     Chief Complaint:   Chief Complaint   Patient presents with    Follow-up     Patient present today for follow up post chemo. Patient is well, no breast pain nor concerns to report.        Subjective:   Treatment History:  1. 2022-Genetic Testing- BRCA 1 mutation  2. Medical Oncology Referral to Dr. Marcy Del Valle  3. 2022 Mediport Placement  4. Neoadjuvant Chemotherapy - AC/Ketruda followed by weekly Taxol x 12 cycles (last dose of Taxol 2022) + Carboplatin/Keytruda (last dose of Keytruda 2022)    Interval History:  1/10/2023 - Rob Dennison presents today to discuss surgical options following neoadjuvant chemotherapy. She has been doing well. She did get hospitalized after her 2nd dose of chemotherapy, but dosage was reduced and was fine. She has some nail bed color changes still. Appetite is still good.     HPI:  Rob Dennison is a 42 y.o. female who presents on 2022 for evaluation of newly diagnosed right  breast cancer.     A detailed patient history was obtained and reviewed. She currently denies any breast issues including rashes, redness, pain, swelling, nipple discharge, or new lumps/masses.     MG breast density: Category C (Heterogeneously dense)      Imagin. 3/9/2022 BL SC MG at [facility] - which revealed a a focal asymmetry in the right breast central to the nipple, posterior depth.    No other significant masses, calcifications, or other findings are seen in either breast.  BIRADS-0; additional imaging needed.     2. 2022 R DG MG/ R US BREAST LIMITED at [facility] - which revealed on R MG at central region posterior depth focal asymmetry recalled from screening mammography. On today's additional mammographic views, there  are three adjacent oval equal density masses with indistinct margins located in the central region far posterior depth. The most posterior of these three masses correlates with the focal asymmetry recalled from screening. No other significant mammographic finding.  On R US, at 7 o'clock to 9 o'clock and subareolar region revealed three adjacent oval hypoechoic masses with indistinct margins at the 9 o'clock  subareolar position, correlating with the three adjacent oval equal density masses with indistinct margins seen on today's mammogram. These three masses measure 0.5 x 0.5 x 0.4 cm, 0.9 x 0.9 x 0.8 cm, and 0.8 x 0.4 x 0.5 cm.   Incidentally seen at 7 o'clock to 8 o'clock  are several subcentimeter cysts of varying size and complication. These are benign. No suspicious right axillary adenopathy. BIRADS-4 suspicious; biopsy recommended.    3. 6/8/2022 Xray Right Shoulder at [facility] - which revealed no acute osseous abnormality.     4. 6/9/2022 BL BREAST MRI at [facility] - which revealed in R breast, 9 o'clock subareolar right breast, posterior depth, there is a 2.7 x 1.5 x 1.6 cm oval mass with non circumscribed margins which demonstrates rapid, washout kinetics (axial image 333).  There is a signal void consistent with a T3 coil shaped HydroMARK clip within this known malignancy.  In the subareolar right breast, anterior to middle depth, there is a 0.7 x 0.3 x 0.7 cm oval, enhancing mass with circumscribed margins (axial image 335).  This mass is 3.5 cm anterior to the known malignancy and 2.8 cm deep to the nipple.   Inferomedial to the known malignancy, in the lower inner quadrant of the right breast, posterior depth, there are 2 areas of clumped, non mass enhancement.  The deeper of the 2 areas of clumped, non mass enhancement spans 0.8 cm (axial image 341) and is 1.4 cm anterior to the pectoralis major muscle.  The more superficial area of clumped, non mass enhancement spans 0.7 cm (axial image 342).   Superomedial to  the known malignancy, in the upper inner quadrant of the right breast, middle depth, there is linear, non mass enhancement spanning 1.7 cm anterior to posterior (axial image 327).  In total the abnormal enhancement spans 7.6 cm anterior to posterior.  There is no skin thickening or nipple retraction.  No axillary or internal mammary lymphadenopathy is identified.  In the L breast, there is no suspicious areas of enhancement or areas of architectural distortion are seen.  There is no skin thickening or nipple retraction.  No axillary or internal mammary lymphadenopathy is identified.  BIRADS-4 suspicious          Pathology:  1. 2022 Ultrasound-guided Core Needle Biopsy Right Breast Mass 9 o'clock Subareolar-  -Invasive ductal carcinoma, grade 3 (measuring 5.0mm)  ER 1%  DC 0%  HER2 Negative  Ki67 63%     OB/GYN History:  Age at Menarche Onset: 10  Menopausal Status: premenopausal, LMP: 2022   Hysterectomy/Oophorectomy: no,n/a  Hormonal birth control (duration): Yes OCP (estrogen/progesterone).   Pregnancy History:   Age at first live birth: 1  Hormone Replacement Therapy: No, none  Patient admits to nipple discharge. Described as bilateral and clear after having first mammogram which has resolved   Patient denies to previous breast biopsy.   Patient denies to a personal history of breast cancer.     Other Relevant History:  Prior thoracic RT: none  Genetic testing: yes  Ashkenazi Bahai descent: No     Family History:  Father - Skin cancer possible melanoma at age 80  Paternal Grandfather - colon cancer at age 70     Past History:  Past Medical History:   Diagnosis Date    Anxiety     Breast cancer in female     right    Depression     Hypertension         Past Surgical History:   Procedure Laterality Date    BACK SURGERY      CHOLECYSTECTOMY      ENDOSCOPIC RELEASE OF BOTH CARPAL TUNNELS Bilateral     TRIGGER FINGER RELEASE Right     WISDOM TOOTH EXTRACTION          Social History     Socioeconomic  History    Marital status: Legally    Tobacco Use    Smoking status: Never    Smokeless tobacco: Never   Substance and Sexual Activity    Alcohol use: Yes     Comment: Occasionally    Drug use: Never    Sexual activity: Yes        Body mass index is 33.01 kg/m².     Allergy/Medications:   Review of patient's allergies indicates:   Allergen Reactions    Fosaprepitant Other (See Comments)     Flushing, nausea, abdominal discomfort    Adhesive     Erythromycin base Other (See Comments) and Rash     Fever          Current Outpatient Medications:     acyclovir (ZOVIRAX) 400 MG tablet, Take 400 mg by mouth once daily., Disp: , Rfl:     carvediloL (COREG) 3.125 MG tablet, SMARTSI Tablet(s) By Mouth Morning-Evening, Disp: , Rfl:     cyanocobalamin 2000 MCG tablet, Take 2,000 mcg by mouth once daily., Disp: , Rfl:     DULoxetine (CYMBALTA) 60 MG capsule,  See Instructions, TAKE 1 CAPSULE BY MOUTH DAILY, # 90 cap(s), 1 Refill(s), Pharmacy: [id number], 155, cm, Height/Length Dosing, 21 13:16:00 CST, 79.83, kg, Weight Dosing, 21 13:16:00 CST, Disp: , Rfl:     hydrOXYzine pamoate (VISTARIL) 50 MG Cap, Take by mouth., Disp: , Rfl:     hyoscyamine (LEVSIN/SL) 0.125 mg Subl, DISSOLVE 1 TABLET UNDER THE TONGUE EVERY 6 HOURS AS NEEDED FOR CRAMPING OR DIARRHEA, Disp: , Rfl:     ipratropium (ATROVENT) 21 mcg (0.03 %) nasal spray, SMARTSI Spray(s) Both Nares, Disp: , Rfl:     LIDOCAINE VISCOUS 2 % solution, SMARTSIG:10 Milliliter(s) By Mouth Every 3 Hours PRN, Disp: , Rfl:     LIDOcaine-prilocaine (EMLA) cream, Apply topically as needed., Disp: , Rfl:     lisinopriL 10 MG tablet, Take 10 mg by mouth once daily., Disp: , Rfl:     multivitamin (THERAGRAN) per tablet, Take 1 tablet by mouth once daily., Disp: , Rfl:     OLANZapine (ZYPREXA) 2.5 MG tablet, Take 2.5 mg by mouth every evening., Disp: , Rfl:     ondansetron (ZOFRAN-ODT) 8 MG TbDL, 8 mg., Disp: , Rfl:     pyridoxine, vitamin B6, (B-6)  100 MG Tab, Take 50 mg by mouth once daily., Disp: , Rfl:     vitamin E 400 UNIT capsule, Take 400 Units by mouth once daily., Disp: , Rfl:     duloxetine HCl (DULOXETINE ORAL), Take 120 mg by mouth once daily., Disp: , Rfl:     fluticasone propionate (FLONASE) 50 mcg/actuation nasal spray, 1 spray by Each Nostril route once daily., Disp: , Rfl:     loratadine (CLARITIN) 10 mg tablet, Take 10 mg by mouth once daily., Disp: , Rfl:     spironolactone (ALDACTONE) 100 MG tablet, Take 100 mg by mouth every morning., Disp: , Rfl:   No current facility-administered medications for this visit.    Facility-Administered Medications Ordered in Other Visits:     scopolamine 1.3-1.5 mg (1 mg over 3 days) 1 patch, 1 patch, Transdermal, Q3 Days, Sridhar Mao MD, 1 patch at 07/01/22 0624       Review of Systems:  Constitutional: denies fevers, chills, weight loss  HEENT: denies blurry/double vision, changes in hearing, odynophagia, dysphagia  Respiratory: denies cough, shortness of breath  Cardiovascular: denies palpitations, swelling of the extremities  GI: denies abdominal pain, nausea/vomiting, hematochezia, frequent stools  : denies frequency, dysuria, flank pain, hematuria  Skin: denies new rashes  Neurological: denies muscular/sensory deficiencies, loss of coordination, headaches, memory changes  Endo: denies hair loss/thinning, nervousness, hot flashes, heat/cold intolerance, lumps in the neck area  Heme: denies easy bruising and fatigue  Psychological: denies anxious/depressive moods  Musculoskeletal: denies bony pain, muscle cramps, swollen joints    Objective:     Vitals:  Blood pressure 104/72, pulse 93, temperature 98 °F (36.7 °C), temperature source Oral, resp. rate 18, height 5' (1.524 m), weight 76.7 kg (169 lb), last menstrual period 06/07/2022, SpO2 99 %.      Physical Exam:  General: The patient is awake, alert and oriented times three. The patient is well nourished and in no acute distress.   Neck: There  is no evidence of palpable cervical, supraclavicular or axillary adenopathy. The neck is supple. The thyroid is not enlarged.   Musculoskeletal: The patient has a normal range of motion of her bilateral upper extremities.   Chest: Examination of the chest wall fails to reveal any obvious abnormalities. The lungs are clear to auscultation bilaterally without rales, rhonchi, or wheezing.   Cardiovascular: The heart has a regular rate and rhythm without murmurs, gallops or rubs.   Breast: Examination of right breast fails to reveal any dominant masses or areas of significant focal nodularity. The nipple is everted without evidence of discharge. There is no skin dimpling with movement of the pectoralis. There is no significant skin changes overlying the breast. Examination of the left breast fails to reveal any dominant masses or areas of significant focal nodularity. The nipple is everted without evidence of discharge. There is no skin dimpling with movement of the pectoralis. There is no significant skin changes overlying the breast.  Abdomen: The abdomen is soft, flat, nontender and nondistended with no palpable masses or organomegaly.  Integumentary: no rashes or skin lesions present  Neurologic: cranial nerves intact, no signs of peripheral neurological deficit, motor/sensory function intact    Assessment and Plan:     Encounter Diagnoses   Name Primary?    Malignant neoplasm of central portion of right breast in female, estrogen receptor negative Yes    Breast cancer, BRCA1 positive, right     Encounter for surgical counseling           She is status post NAC chemotherapy and presents for surgical discussion and planning. She is also a BRCA 1 mutation carrier and will need additional discussion and evaluation by OB/GYN    We discussed her imaging and pathology results in detail.    We discussed the need to proceed with bilateral mastectomy and right axillary sentinel lymph node biopsy. The mastectomy procedure was  discussed in detail. I informed her of the complications which include surgical site infections, hematoma or seroma requiring operation, necrosis of nipple-areola and/or mastectomy flaps requiring debridement or hyperbaric therapy, unplanned re-operations, and delay in adjuvant treatments. She does not wish to have reconstruction at this time.    Following this, I also explained to her the procedure of sentinel lymph node mapping and its rationale. We have a 98% success rate identifying the sentinel node. The need to use radioactive dye and blue dye to ensure proper identification of the node was explained. MagTrace brown dye can also be used in the setting of a mastectomy. Also the small but real risk of anaphylactic shock with blue dye or MagTrace dye was discussed. The need to perform completion dissection should the sentinel lymph node be positive was also explained to the patient.     Specifically, we went over the local recurrence rate and survival rates with mastectomy and breast conserving therapy, the small but real risk of lymphedema should we need completion dissection, and the indications for post mastectomy radiation in certain subset of patients.    The need to additional therapy will depend on the final pathology results from surgery.           Plan:     1. Surgery - Bilateral mastectomy and right axillary sentinel lymph node biopsy, MagTrace Injection preop  Tentative Date: 1/30/2023  2. Preop - CBC, CMP,  3. Surgical instructions and information were discussed in detail  4. Recommend follow-up with Gyn and we will assist if she needs.         All of questions were answered    Genie Josue MD  Breast Surgical Oncology     OFFICE VISIT CODING:    Non-face-to-face time included:  Yes Preparing to see the patient such as reviewing the patient record  No Obtaining and reviewing separately obtained history  No Independently interpreting results  Yes Documenting clinical information in electronic health  record  No Ordering appropriate medications  Yes Ordering appropriate tests  Yes Ordering appropriate procedures (including follow-up)  Yes Referring and communicating with other health care professionals (not separately reported)  Yes Care Coordination (not separately reported)    Face-to-face time included:  Yes Performing a medically necessary appropriate history, examination, and/or evaluation  Yes Communicating results to the patient/family/caregiver  Yes Counseling and educating the patient/family/caregiver  Yes Answering patient/family/caregiver questions    Total Time: 45 minutes    Total time includes both face-to-face and non-face-to-face time personally spent by myself on the day of the visit.

## 2023-01-10 ENCOUNTER — OFFICE VISIT (OUTPATIENT)
Dept: SURGERY | Facility: CLINIC | Age: 43
End: 2023-01-10
Payer: COMMERCIAL

## 2023-01-10 VITALS
TEMPERATURE: 98 F | HEIGHT: 60 IN | HEART RATE: 93 BPM | BODY MASS INDEX: 33.18 KG/M2 | SYSTOLIC BLOOD PRESSURE: 104 MMHG | WEIGHT: 169 LBS | DIASTOLIC BLOOD PRESSURE: 72 MMHG | RESPIRATION RATE: 18 BRPM | OXYGEN SATURATION: 99 %

## 2023-01-10 DIAGNOSIS — Z71.89 ENCOUNTER FOR SURGICAL COUNSELING: ICD-10-CM

## 2023-01-10 DIAGNOSIS — Z15.01 BREAST CANCER, BRCA1 POSITIVE, RIGHT: ICD-10-CM

## 2023-01-10 DIAGNOSIS — Z17.1 MALIGNANT NEOPLASM OF CENTRAL PORTION OF RIGHT BREAST IN FEMALE, ESTROGEN RECEPTOR NEGATIVE: Primary | ICD-10-CM

## 2023-01-10 DIAGNOSIS — C50.911 BREAST CANCER, BRCA1 POSITIVE, RIGHT: ICD-10-CM

## 2023-01-10 DIAGNOSIS — C50.111 MALIGNANT NEOPLASM OF CENTRAL PORTION OF RIGHT BREAST IN FEMALE, ESTROGEN RECEPTOR NEGATIVE: Primary | ICD-10-CM

## 2023-01-10 PROCEDURE — 3074F PR MOST RECENT SYSTOLIC BLOOD PRESSURE < 130 MM HG: ICD-10-PCS | Mod: CPTII,S$GLB,, | Performed by: SURGERY

## 2023-01-10 PROCEDURE — 99999 PR PBB SHADOW E&M-EST. PATIENT-LVL V: ICD-10-PCS | Mod: PBBFAC,,, | Performed by: SURGERY

## 2023-01-10 PROCEDURE — 1159F PR MEDICATION LIST DOCUMENTED IN MEDICAL RECORD: ICD-10-PCS | Mod: CPTII,S$GLB,, | Performed by: SURGERY

## 2023-01-10 PROCEDURE — 1159F MED LIST DOCD IN RCRD: CPT | Mod: CPTII,S$GLB,, | Performed by: SURGERY

## 2023-01-10 PROCEDURE — 3008F BODY MASS INDEX DOCD: CPT | Mod: CPTII,S$GLB,, | Performed by: SURGERY

## 2023-01-10 PROCEDURE — 3074F SYST BP LT 130 MM HG: CPT | Mod: CPTII,S$GLB,, | Performed by: SURGERY

## 2023-01-10 PROCEDURE — 3078F PR MOST RECENT DIASTOLIC BLOOD PRESSURE < 80 MM HG: ICD-10-PCS | Mod: CPTII,S$GLB,, | Performed by: SURGERY

## 2023-01-10 PROCEDURE — 99215 PR OFFICE/OUTPT VISIT, EST, LEVL V, 40-54 MIN: ICD-10-PCS | Mod: S$GLB,,, | Performed by: SURGERY

## 2023-01-10 PROCEDURE — 1160F RVW MEDS BY RX/DR IN RCRD: CPT | Mod: CPTII,S$GLB,, | Performed by: SURGERY

## 2023-01-10 PROCEDURE — 3008F PR BODY MASS INDEX (BMI) DOCUMENTED: ICD-10-PCS | Mod: CPTII,S$GLB,, | Performed by: SURGERY

## 2023-01-10 PROCEDURE — 99215 OFFICE O/P EST HI 40 MIN: CPT | Mod: S$GLB,,, | Performed by: SURGERY

## 2023-01-10 PROCEDURE — 1160F PR REVIEW ALL MEDS BY PRESCRIBER/CLIN PHARMACIST DOCUMENTED: ICD-10-PCS | Mod: CPTII,S$GLB,, | Performed by: SURGERY

## 2023-01-10 PROCEDURE — 99999 PR PBB SHADOW E&M-EST. PATIENT-LVL V: CPT | Mod: PBBFAC,,, | Performed by: SURGERY

## 2023-01-10 PROCEDURE — 3078F DIAST BP <80 MM HG: CPT | Mod: CPTII,S$GLB,, | Performed by: SURGERY

## 2023-01-10 RX ORDER — OLANZAPINE 2.5 MG/1
2.5 TABLET ORAL NIGHTLY
COMMUNITY
Start: 2022-12-15

## 2023-01-10 RX ORDER — IPRATROPIUM BROMIDE 21 UG/1
SPRAY, METERED NASAL
COMMUNITY
Start: 2022-12-08

## 2023-01-10 RX ORDER — LIDOCAINE HYDROCHLORIDE 20 MG/ML
SOLUTION ORAL; TOPICAL
COMMUNITY
Start: 2022-08-15

## 2023-01-10 RX ORDER — HYDROXYZINE PAMOATE 50 MG/1
CAPSULE ORAL
COMMUNITY
Start: 2022-08-04

## 2023-01-10 RX ORDER — LIDOCAINE AND PRILOCAINE 25; 25 MG/G; MG/G
CREAM TOPICAL
COMMUNITY
Start: 2022-12-12

## 2023-01-10 RX ORDER — CARVEDILOL 3.12 MG/1
TABLET ORAL
COMMUNITY
Start: 2022-11-01

## 2023-01-11 RX ORDER — SODIUM CHLORIDE, SODIUM LACTATE, POTASSIUM CHLORIDE, CALCIUM CHLORIDE 600; 310; 30; 20 MG/100ML; MG/100ML; MG/100ML; MG/100ML
INJECTION, SOLUTION INTRAVENOUS CONTINUOUS
Status: CANCELLED | OUTPATIENT
Start: 2023-01-11

## 2023-01-27 ENCOUNTER — OFFICE VISIT (OUTPATIENT)
Dept: SURGERY | Facility: CLINIC | Age: 43
End: 2023-01-27
Payer: COMMERCIAL

## 2023-01-27 VITALS
BODY MASS INDEX: 33.45 KG/M2 | WEIGHT: 170.38 LBS | DIASTOLIC BLOOD PRESSURE: 75 MMHG | HEART RATE: 88 BPM | HEIGHT: 60 IN | OXYGEN SATURATION: 97 % | TEMPERATURE: 99 F | RESPIRATION RATE: 18 BRPM | SYSTOLIC BLOOD PRESSURE: 124 MMHG

## 2023-01-27 DIAGNOSIS — C50.111 MALIGNANT NEOPLASM OF CENTRAL PORTION OF RIGHT BREAST IN FEMALE, ESTROGEN RECEPTOR NEGATIVE: Primary | ICD-10-CM

## 2023-01-27 DIAGNOSIS — C50.911 BREAST CANCER, BRCA1 POSITIVE, RIGHT: ICD-10-CM

## 2023-01-27 DIAGNOSIS — Z17.1 MALIGNANT NEOPLASM OF CENTRAL PORTION OF RIGHT BREAST IN FEMALE, ESTROGEN RECEPTOR NEGATIVE: Primary | ICD-10-CM

## 2023-01-27 DIAGNOSIS — Z15.01 BREAST CANCER, BRCA1 POSITIVE, RIGHT: ICD-10-CM

## 2023-01-27 PROCEDURE — 99999 PR PBB SHADOW E&M-EST. PATIENT-LVL V: CPT | Mod: PBBFAC,,,

## 2023-01-27 PROCEDURE — 99499 UNLISTED E&M SERVICE: CPT | Mod: S$GLB,,,

## 2023-01-27 PROCEDURE — 99499 NO LOS: ICD-10-PCS | Mod: S$GLB,,,

## 2023-01-27 PROCEDURE — 3008F BODY MASS INDEX DOCD: CPT | Mod: CPTII,S$GLB,,

## 2023-01-27 PROCEDURE — 3078F PR MOST RECENT DIASTOLIC BLOOD PRESSURE < 80 MM HG: ICD-10-PCS | Mod: CPTII,S$GLB,,

## 2023-01-27 PROCEDURE — 1160F RVW MEDS BY RX/DR IN RCRD: CPT | Mod: CPTII,S$GLB,,

## 2023-01-27 PROCEDURE — 3078F DIAST BP <80 MM HG: CPT | Mod: CPTII,S$GLB,,

## 2023-01-27 PROCEDURE — 1159F PR MEDICATION LIST DOCUMENTED IN MEDICAL RECORD: ICD-10-PCS | Mod: CPTII,S$GLB,,

## 2023-01-27 PROCEDURE — 3008F PR BODY MASS INDEX (BMI) DOCUMENTED: ICD-10-PCS | Mod: CPTII,S$GLB,,

## 2023-01-27 PROCEDURE — 1160F PR REVIEW ALL MEDS BY PRESCRIBER/CLIN PHARMACIST DOCUMENTED: ICD-10-PCS | Mod: CPTII,S$GLB,,

## 2023-01-27 PROCEDURE — 3074F PR MOST RECENT SYSTOLIC BLOOD PRESSURE < 130 MM HG: ICD-10-PCS | Mod: CPTII,S$GLB,,

## 2023-01-27 PROCEDURE — 99999 PR PBB SHADOW E&M-EST. PATIENT-LVL V: ICD-10-PCS | Mod: PBBFAC,,,

## 2023-01-27 PROCEDURE — 3074F SYST BP LT 130 MM HG: CPT | Mod: CPTII,S$GLB,,

## 2023-01-27 PROCEDURE — 1159F MED LIST DOCD IN RCRD: CPT | Mod: CPTII,S$GLB,,

## 2023-01-27 NOTE — PROGRESS NOTES
DATE OF SERVICE: 01/27/2023    Provider: WOODROW Dinero    PREOPERATIVE DIAGNOSIS:   Encounter Diagnoses   Name Primary?    Malignant neoplasm of central portion of right breast in female, estrogen receptor negative Yes    Breast cancer, BRCA1 positive, right        POSTOPERATIVE DIAGNOSIS:   Encounter Diagnoses   Name Primary?    Malignant neoplasm of central portion of right breast in female, estrogen receptor negative Yes    Breast cancer, BRCA1 positive, right        PROCEDURE: Injection of non-radioactive, non-contrast/non-visualization agent - 2mL of superparamagnetic iron oxide (SPIO) nanoparticles (56 mg Iron and 64 mg Carboxydextran/2 mL)    ANESTHESIA: 5 mL of 1% Lidocaine was injected at the lateral subareolar border right breast     TECHNIQUE:    1. 2.0 mL of superparamagnetic iron oxide (SPIO) nanoparticles was injected subareolar in the right breast for [place] node localization with a SentiMag probe.       PLAN:  right [place] Lymph Node Localization performed today as above with planned SentiMag probe localization anticipated at the time of right SLNB        All of her questions were answered.     WOODROW Dinero

## 2023-01-30 ENCOUNTER — ANESTHESIA EVENT (OUTPATIENT)
Dept: SURGERY | Facility: HOSPITAL | Age: 43
End: 2023-01-30
Payer: COMMERCIAL

## 2023-01-30 ENCOUNTER — ANESTHESIA (OUTPATIENT)
Dept: SURGERY | Facility: HOSPITAL | Age: 43
End: 2023-01-30
Payer: COMMERCIAL

## 2023-01-30 ENCOUNTER — HOSPITAL ENCOUNTER (OUTPATIENT)
Facility: HOSPITAL | Age: 43
Discharge: HOME | End: 2023-01-30
Attending: SURGERY | Admitting: SURGERY
Payer: COMMERCIAL

## 2023-01-30 DIAGNOSIS — Z17.1 MALIGNANT NEOPLASM OF CENTRAL PORTION OF RIGHT BREAST IN FEMALE, ESTROGEN RECEPTOR NEGATIVE: ICD-10-CM

## 2023-01-30 DIAGNOSIS — Z15.01 BREAST CANCER, BRCA1 POSITIVE, RIGHT: ICD-10-CM

## 2023-01-30 DIAGNOSIS — C50.111 MALIGNANT NEOPLASM OF CENTRAL PORTION OF RIGHT BREAST IN FEMALE, ESTROGEN RECEPTOR NEGATIVE: ICD-10-CM

## 2023-01-30 DIAGNOSIS — Z90.10 STATUS POST MASTECTOMY, UNSPECIFIED LATERALITY: Primary | ICD-10-CM

## 2023-01-30 DIAGNOSIS — C50.911 BREAST CANCER, BRCA1 POSITIVE, RIGHT: ICD-10-CM

## 2023-01-30 PROCEDURE — 71000033 HC RECOVERY, INTIAL HOUR: Performed by: SURGERY

## 2023-01-30 PROCEDURE — 37000008 HC ANESTHESIA 1ST 15 MINUTES: Performed by: SURGERY

## 2023-01-30 PROCEDURE — 63600175 PHARM REV CODE 636 W HCPCS: Performed by: NURSE ANESTHETIST, CERTIFIED REGISTERED

## 2023-01-30 PROCEDURE — 25000003 PHARM REV CODE 250: Performed by: PHYSICIAN ASSISTANT

## 2023-01-30 PROCEDURE — 63600175 PHARM REV CODE 636 W HCPCS

## 2023-01-30 PROCEDURE — 71000039 HC RECOVERY, EACH ADD'L HOUR: Performed by: SURGERY

## 2023-01-30 PROCEDURE — 27201423 OPTIME MED/SURG SUP & DEVICES STERILE SUPPLY: Performed by: SURGERY

## 2023-01-30 PROCEDURE — 19303 PR MASTECTOMY, SIMPLE, COMPLETE: ICD-10-PCS | Mod: 50,AS,, | Performed by: PHYSICIAN ASSISTANT

## 2023-01-30 PROCEDURE — 25000003 PHARM REV CODE 250: Performed by: NURSE ANESTHETIST, CERTIFIED REGISTERED

## 2023-01-30 PROCEDURE — 71000016 HC POSTOP RECOV ADDL HR: Performed by: SURGERY

## 2023-01-30 PROCEDURE — 38900 PR INTRAOPERATIVE SENTINEL LYMPH NODE ID W DYE INJECTION: ICD-10-PCS | Mod: RT,,, | Performed by: SURGERY

## 2023-01-30 PROCEDURE — 38525 PR BIOPSY/REM LYMPH NODES, AXILLARY: ICD-10-PCS | Mod: 51,RT,, | Performed by: SURGERY

## 2023-01-30 PROCEDURE — 37000009 HC ANESTHESIA EA ADD 15 MINS: Performed by: SURGERY

## 2023-01-30 PROCEDURE — 63600175 PHARM REV CODE 636 W HCPCS: Performed by: SURGERY

## 2023-01-30 PROCEDURE — A4216 STERILE WATER/SALINE, 10 ML: HCPCS | Performed by: SURGERY

## 2023-01-30 PROCEDURE — 36000706: Performed by: SURGERY

## 2023-01-30 PROCEDURE — 38900 IO MAP OF SENT LYMPH NODE: CPT | Mod: RT,,, | Performed by: SURGERY

## 2023-01-30 PROCEDURE — 19303 MAST SIMPLE COMPLETE: CPT | Mod: 50,AS,, | Performed by: PHYSICIAN ASSISTANT

## 2023-01-30 PROCEDURE — 25000003 PHARM REV CODE 250

## 2023-01-30 PROCEDURE — 25000003 PHARM REV CODE 250: Performed by: SURGERY

## 2023-01-30 PROCEDURE — C1819 TISSUE LOCALIZATION-EXCISION: HCPCS | Performed by: SURGERY

## 2023-01-30 PROCEDURE — 63600175 PHARM REV CODE 636 W HCPCS: Performed by: ANESTHESIOLOGY

## 2023-01-30 PROCEDURE — 71000015 HC POSTOP RECOV 1ST HR: Performed by: SURGERY

## 2023-01-30 PROCEDURE — C1729 CATH, DRAINAGE: HCPCS | Performed by: SURGERY

## 2023-01-30 PROCEDURE — 19303 MAST SIMPLE COMPLETE: CPT | Mod: 50,,, | Performed by: SURGERY

## 2023-01-30 PROCEDURE — 19303 PR MASTECTOMY, SIMPLE, COMPLETE: ICD-10-PCS | Mod: 50,,, | Performed by: SURGERY

## 2023-01-30 PROCEDURE — 38525 BIOPSY/REMOVAL LYMPH NODES: CPT | Mod: 51,RT,, | Performed by: SURGERY

## 2023-01-30 PROCEDURE — 36000707: Performed by: SURGERY

## 2023-01-30 RX ORDER — METOCLOPRAMIDE HYDROCHLORIDE 5 MG/ML
INJECTION INTRAMUSCULAR; INTRAVENOUS
Status: COMPLETED
Start: 2023-01-30 | End: 2023-01-30

## 2023-01-30 RX ORDER — CEFTRIAXONE 1 G/1
INJECTION, POWDER, FOR SOLUTION INTRAMUSCULAR; INTRAVENOUS
Status: DISCONTINUED
Start: 2023-01-30 | End: 2023-01-30 | Stop reason: HOSPADM

## 2023-01-30 RX ORDER — ROCURONIUM BROMIDE 10 MG/ML
INJECTION, SOLUTION INTRAVENOUS
Status: DISCONTINUED | OUTPATIENT
Start: 2023-01-30 | End: 2023-01-30

## 2023-01-30 RX ORDER — FENTANYL CITRATE 50 UG/ML
INJECTION, SOLUTION INTRAMUSCULAR; INTRAVENOUS
Status: DISCONTINUED | OUTPATIENT
Start: 2023-01-30 | End: 2023-01-30

## 2023-01-30 RX ORDER — SODIUM CHLORIDE 9 MG/ML
INJECTION, SOLUTION INTRAMUSCULAR; INTRAVENOUS; SUBCUTANEOUS
Status: DISCONTINUED
Start: 2023-01-30 | End: 2023-01-30 | Stop reason: HOSPADM

## 2023-01-30 RX ORDER — ONDANSETRON 2 MG/ML
4 INJECTION INTRAMUSCULAR; INTRAVENOUS ONCE
Status: DISCONTINUED | OUTPATIENT
Start: 2023-01-30 | End: 2023-01-30 | Stop reason: HOSPADM

## 2023-01-30 RX ORDER — SODIUM CHLORIDE, SODIUM LACTATE, POTASSIUM CHLORIDE, CALCIUM CHLORIDE 600; 310; 30; 20 MG/100ML; MG/100ML; MG/100ML; MG/100ML
INJECTION, SOLUTION INTRAVENOUS CONTINUOUS
Status: CANCELLED | OUTPATIENT
Start: 2023-01-30

## 2023-01-30 RX ORDER — CEFAZOLIN SODIUM 1 G/3ML
INJECTION, POWDER, FOR SOLUTION INTRAMUSCULAR; INTRAVENOUS
Status: DISCONTINUED | OUTPATIENT
Start: 2023-01-30 | End: 2023-01-30 | Stop reason: HOSPADM

## 2023-01-30 RX ORDER — CEFAZOLIN SODIUM 1 G/3ML
INJECTION, POWDER, FOR SOLUTION INTRAMUSCULAR; INTRAVENOUS
Status: DISCONTINUED | OUTPATIENT
Start: 2023-01-30 | End: 2023-01-30

## 2023-01-30 RX ORDER — METOCLOPRAMIDE HYDROCHLORIDE 5 MG/ML
10 INJECTION INTRAMUSCULAR; INTRAVENOUS EVERY 10 MIN PRN
Status: DISCONTINUED | OUTPATIENT
Start: 2023-01-30 | End: 2023-01-30 | Stop reason: HOSPADM

## 2023-01-30 RX ORDER — DEXMEDETOMIDINE HYDROCHLORIDE 100 UG/ML
INJECTION, SOLUTION INTRAVENOUS
Status: DISCONTINUED | OUTPATIENT
Start: 2023-01-30 | End: 2023-01-30

## 2023-01-30 RX ORDER — CEFAZOLIN SODIUM 2 G/50ML
2 SOLUTION INTRAVENOUS
Status: DISCONTINUED | OUTPATIENT
Start: 2023-01-30 | End: 2023-01-30 | Stop reason: HOSPADM

## 2023-01-30 RX ORDER — ONDANSETRON HYDROCHLORIDE 2 MG/ML
INJECTION, SOLUTION INTRAMUSCULAR; INTRAVENOUS
Status: DISCONTINUED | OUTPATIENT
Start: 2023-01-30 | End: 2023-01-30

## 2023-01-30 RX ORDER — FAMOTIDINE 10 MG/ML
20 INJECTION INTRAVENOUS ONCE
Status: CANCELLED | OUTPATIENT
Start: 2023-01-30 | End: 2023-01-30

## 2023-01-30 RX ORDER — BUPIVACAINE HYDROCHLORIDE 5 MG/ML
INJECTION, SOLUTION EPIDURAL; INTRACAUDAL
Status: DISCONTINUED | OUTPATIENT
Start: 2023-01-30 | End: 2023-01-30 | Stop reason: HOSPADM

## 2023-01-30 RX ORDER — LIDOCAINE HYDROCHLORIDE 10 MG/ML
INJECTION, SOLUTION EPIDURAL; INFILTRATION; INTRACAUDAL; PERINEURAL
Status: DISCONTINUED | OUTPATIENT
Start: 2023-01-30 | End: 2023-01-30

## 2023-01-30 RX ORDER — CEFAZOLIN 2 G/1
INJECTION, POWDER, FOR SOLUTION INTRAMUSCULAR; INTRAVENOUS
Status: COMPLETED
Start: 2023-01-30 | End: 2023-01-30

## 2023-01-30 RX ORDER — METHOCARBAMOL 100 MG/ML
1000 INJECTION, SOLUTION INTRAMUSCULAR; INTRAVENOUS ONCE
Status: COMPLETED | OUTPATIENT
Start: 2023-01-30 | End: 2023-01-30

## 2023-01-30 RX ORDER — TRAMADOL HYDROCHLORIDE 50 MG/1
50 TABLET ORAL EVERY 6 HOURS PRN
Qty: 28 TABLET | Refills: 0 | Status: SHIPPED | OUTPATIENT
Start: 2023-01-30 | End: 2023-02-07

## 2023-01-30 RX ORDER — DEXAMETHASONE SODIUM PHOSPHATE 4 MG/ML
INJECTION, SOLUTION INTRA-ARTICULAR; INTRALESIONAL; INTRAMUSCULAR; INTRAVENOUS; SOFT TISSUE
Status: DISCONTINUED | OUTPATIENT
Start: 2023-01-30 | End: 2023-01-30

## 2023-01-30 RX ORDER — MIDAZOLAM HYDROCHLORIDE 1 MG/ML
2 INJECTION INTRAMUSCULAR; INTRAVENOUS ONCE AS NEEDED
Status: CANCELLED | OUTPATIENT
Start: 2023-01-30 | End: 2034-06-28

## 2023-01-30 RX ORDER — SODIUM CHLORIDE 9 MG/ML
INJECTION, SOLUTION INTRAVENOUS CONTINUOUS
Status: CANCELLED | OUTPATIENT
Start: 2023-01-30

## 2023-01-30 RX ORDER — METOCLOPRAMIDE HYDROCHLORIDE 5 MG/ML
10 INJECTION INTRAMUSCULAR; INTRAVENOUS ONCE
Status: CANCELLED | OUTPATIENT
Start: 2023-01-30 | End: 2023-01-30

## 2023-01-30 RX ORDER — MEPERIDINE HYDROCHLORIDE 25 MG/ML
12.5 INJECTION INTRAMUSCULAR; INTRAVENOUS; SUBCUTANEOUS ONCE
Status: DISCONTINUED | OUTPATIENT
Start: 2023-01-30 | End: 2023-01-30

## 2023-01-30 RX ORDER — LIDOCAINE HYDROCHLORIDE 10 MG/ML
1 INJECTION, SOLUTION EPIDURAL; INFILTRATION; INTRACAUDAL; PERINEURAL ONCE
Status: CANCELLED | OUTPATIENT
Start: 2023-01-30 | End: 2023-01-30

## 2023-01-30 RX ORDER — ACETAMINOPHEN 500 MG
1000 TABLET ORAL ONCE
Status: CANCELLED | OUTPATIENT
Start: 2023-01-30 | End: 2023-01-30

## 2023-01-30 RX ORDER — METHOCARBAMOL 100 MG/ML
INJECTION, SOLUTION INTRAMUSCULAR; INTRAVENOUS
Status: DISCONTINUED
Start: 2023-01-30 | End: 2023-01-30 | Stop reason: HOSPADM

## 2023-01-30 RX ORDER — CEFAZOLIN SODIUM 1 G/3ML
INJECTION, POWDER, FOR SOLUTION INTRAMUSCULAR; INTRAVENOUS
Status: DISCONTINUED
Start: 2023-01-30 | End: 2023-01-30 | Stop reason: HOSPADM

## 2023-01-30 RX ORDER — BUPIVACAINE HYDROCHLORIDE 5 MG/ML
INJECTION, SOLUTION EPIDURAL; INTRACAUDAL
Status: DISCONTINUED
Start: 2023-01-30 | End: 2023-01-30 | Stop reason: HOSPADM

## 2023-01-30 RX ORDER — TRAMADOL HYDROCHLORIDE 50 MG/1
50 TABLET ORAL EVERY 6 HOURS PRN
Status: CANCELLED | OUTPATIENT
Start: 2023-01-30

## 2023-01-30 RX ORDER — HYDROMORPHONE HYDROCHLORIDE 2 MG/ML
INJECTION, SOLUTION INTRAMUSCULAR; INTRAVENOUS; SUBCUTANEOUS
Status: DISCONTINUED
Start: 2023-01-30 | End: 2023-01-30 | Stop reason: HOSPADM

## 2023-01-30 RX ORDER — SODIUM CHLORIDE, SODIUM LACTATE, POTASSIUM CHLORIDE, CALCIUM CHLORIDE 600; 310; 30; 20 MG/100ML; MG/100ML; MG/100ML; MG/100ML
INJECTION, SOLUTION INTRAVENOUS CONTINUOUS
Status: DISCONTINUED | OUTPATIENT
Start: 2023-01-30 | End: 2023-01-30 | Stop reason: HOSPADM

## 2023-01-30 RX ORDER — HYDROMORPHONE HYDROCHLORIDE 2 MG/ML
0.2 INJECTION, SOLUTION INTRAMUSCULAR; INTRAVENOUS; SUBCUTANEOUS EVERY 5 MIN PRN
Status: DISCONTINUED | OUTPATIENT
Start: 2023-01-30 | End: 2023-01-30

## 2023-01-30 RX ORDER — DIPHENHYDRAMINE HYDROCHLORIDE 50 MG/ML
25 INJECTION INTRAMUSCULAR; INTRAVENOUS EVERY 6 HOURS PRN
Status: DISCONTINUED | OUTPATIENT
Start: 2023-01-30 | End: 2023-01-30 | Stop reason: HOSPADM

## 2023-01-30 RX ORDER — FAMOTIDINE 10 MG/ML
INJECTION INTRAVENOUS
Status: COMPLETED
Start: 2023-01-30 | End: 2023-01-30

## 2023-01-30 RX ORDER — PROPOFOL 10 MG/ML
VIAL (ML) INTRAVENOUS
Status: DISCONTINUED | OUTPATIENT
Start: 2023-01-30 | End: 2023-01-30

## 2023-01-30 RX ORDER — SODIUM CHLORIDE 0.9 % (FLUSH) 0.9 %
SYRINGE (ML) INJECTION
Status: DISCONTINUED | OUTPATIENT
Start: 2023-01-30 | End: 2023-01-30 | Stop reason: HOSPADM

## 2023-01-30 RX ORDER — TRAMADOL HYDROCHLORIDE 50 MG/1
50 TABLET ORAL EVERY 4 HOURS PRN
Status: DISCONTINUED | OUTPATIENT
Start: 2023-01-30 | End: 2023-01-30 | Stop reason: HOSPADM

## 2023-01-30 RX ORDER — IPRATROPIUM BROMIDE AND ALBUTEROL SULFATE 2.5; .5 MG/3ML; MG/3ML
3 SOLUTION RESPIRATORY (INHALATION) ONCE AS NEEDED
Status: DISCONTINUED | OUTPATIENT
Start: 2023-01-30 | End: 2023-01-30 | Stop reason: HOSPADM

## 2023-01-30 RX ORDER — HYDROCODONE BITARTRATE AND ACETAMINOPHEN 5; 325 MG/1; MG/1
1 TABLET ORAL
Status: DISCONTINUED | OUTPATIENT
Start: 2023-01-30 | End: 2023-01-30

## 2023-01-30 RX ORDER — MIDAZOLAM HYDROCHLORIDE 1 MG/ML
INJECTION INTRAMUSCULAR; INTRAVENOUS
Status: COMPLETED
Start: 2023-01-30 | End: 2023-01-30

## 2023-01-30 RX ORDER — ACETAMINOPHEN 500 MG
TABLET ORAL
Status: COMPLETED
Start: 2023-01-30 | End: 2023-01-30

## 2023-01-30 RX ADMIN — ACETAMINOPHEN 1000 MG: 500 TABLET, FILM COATED ORAL at 08:01

## 2023-01-30 RX ADMIN — PROPOFOL 150 MG: 10 INJECTION, EMULSION INTRAVENOUS at 10:01

## 2023-01-30 RX ADMIN — HYDROMORPHONE HYDROCHLORIDE 0.2 MG: 2 INJECTION INTRAMUSCULAR; INTRAVENOUS; SUBCUTANEOUS at 02:01

## 2023-01-30 RX ADMIN — MIDAZOLAM HYDROCHLORIDE 2 MG: 1 INJECTION, SOLUTION INTRAMUSCULAR; INTRAVENOUS at 10:01

## 2023-01-30 RX ADMIN — ONDANSETRON 4 MG: 2 INJECTION INTRAMUSCULAR; INTRAVENOUS at 10:01

## 2023-01-30 RX ADMIN — DEXAMETHASONE SODIUM PHOSPHATE 8 MG: 4 INJECTION, SOLUTION INTRA-ARTICULAR; INTRALESIONAL; INTRAMUSCULAR; INTRAVENOUS; SOFT TISSUE at 10:01

## 2023-01-30 RX ADMIN — SODIUM CHLORIDE, POTASSIUM CHLORIDE, SODIUM LACTATE AND CALCIUM CHLORIDE: 600; 310; 30; 20 INJECTION, SOLUTION INTRAVENOUS at 10:01

## 2023-01-30 RX ADMIN — ROCURONIUM BROMIDE 50 MG: 50 INJECTION INTRAVENOUS at 10:01

## 2023-01-30 RX ADMIN — DEXMEDETOMIDINE 4 MCG: 200 INJECTION, SOLUTION INTRAVENOUS at 12:01

## 2023-01-30 RX ADMIN — FENTANYL CITRATE 100 MCG: 50 INJECTION, SOLUTION INTRAMUSCULAR; INTRAVENOUS at 10:01

## 2023-01-30 RX ADMIN — METOCLOPRAMIDE 10 MG: 5 INJECTION, SOLUTION INTRAMUSCULAR; INTRAVENOUS at 08:01

## 2023-01-30 RX ADMIN — LIDOCAINE HYDROCHLORIDE 50 MG: 10 INJECTION, SOLUTION EPIDURAL; INFILTRATION; INTRACAUDAL; PERINEURAL at 10:01

## 2023-01-30 RX ADMIN — FAMOTIDINE 20 MG: 10 INJECTION, SOLUTION INTRAVENOUS at 08:01

## 2023-01-30 RX ADMIN — DEXMEDETOMIDINE 4 MCG: 200 INJECTION, SOLUTION INTRAVENOUS at 11:01

## 2023-01-30 RX ADMIN — CEFAZOLIN 2 G: 330 INJECTION, POWDER, FOR SOLUTION INTRAMUSCULAR; INTRAVENOUS at 10:01

## 2023-01-30 RX ADMIN — TRAMADOL HYDROCHLORIDE 50 MG: 50 TABLET, COATED ORAL at 05:01

## 2023-01-30 RX ADMIN — METHOCARBAMOL 1000 MG: 100 INJECTION, SOLUTION INTRAMUSCULAR; INTRAVENOUS at 02:01

## 2023-01-30 RX ADMIN — SUGAMMADEX 200 MG: 100 INJECTION, SOLUTION INTRAVENOUS at 01:01

## 2023-01-30 RX ADMIN — SODIUM CHLORIDE, POTASSIUM CHLORIDE, SODIUM LACTATE AND CALCIUM CHLORIDE: 600; 310; 30; 20 INJECTION, SOLUTION INTRAVENOUS at 12:01

## 2023-01-30 NOTE — OP NOTE
DATE OF PROCEDURE: 1/30/2023    SURGEON: Genie Josue M.D.    ASSISTANT:  Shavon Rizzo PA-C    Medical Student: Leonor Lloyd MS-4    PREOPERATIVE DIAGNOSIS: Malignant neoplasm of central portion of right breast in female, estrogen receptor negative [C50.111, Z17.1]  Breast cancer, BRCA1 positive, right [C50.911, Z15.01]     POSTOPERATIVE DIAGNOSIS: Post-Op Diagnosis Codes:     * Malignant neoplasm of central portion of right breast in female, estrogen receptor negative [C50.111, Z17.1]     * Breast cancer, BRCA1 positive, right [C50.911, Z15.01]     ANESTHESIA: General Anesthesiologist: Tony Hernandez DO  CRNA: Jose Chandler CRNA     PROCEDURES PERFORMED:   1. Bilateral breast simple mastectomy  2. Identification of right axillary sentinel lymph node  3. Right axillary deep sentinel lymph node biopsy       PROCEDURE IN DETAIL:   Rob Dennison is a 42 y.o. female brought to the operating room for definitive surgical management of right breast cancer status post neoadjuvant chemotherapy. The patient has elected to undergo bilateral simple mastectomy with right axillary sentinel lymph node biopsy for wyatt assessment. The patient was informed of the possible risks and complications of the procedure, including but not limited to anesthetic risks, bleeding, infection, and need for additional surgery.  The patient concurred with the proposed plan, and has given informed consent.  The site of surgery was properly noted/marked in the preoperative holding area.    The patient's right breast was injected with Superparamagnetic iron oxide (MagTrace) in office to facilitate sentinel lymph node identification. The patient was then brought to the operating room and placed in the supine position with both upper extremities extended.  General anesthesia was administered. Perioperative antibiotics were administered and a time out was performed confirming the patient, site, and procedure. The bilateral chest and axilla  was then prepped and draped in the usual sterile fashion.    The Left Mastectomy  The left prophylactic simple mastectomy was performed first. A large elliptical skin incision was made to right breast tissue that included the nipple-areolar complex. Flaps were raised in the avascular plane between subcutaneous tissue and breast tissue was used for to raise flaps from the clavicle superiorly, the sternum medially, the anterior rectus sheath inferiorly, and the lateral border of the pectoralis major muscle laterally. Hemostasis was maintained in the flaps. Next, the breast tissue and underlying pectoralis fascia were excised from the pectoralis major muscle, progressing from medial to lateral. At the lateral border of the pectoralis major muscle, the breast tissue was sung laterally and a lateral pedicle identified where breast tissue gave way to fat of axilla. The lateral pedicle was incised, removed and the specimen was marked. Superiorly was a short suture and laterally was a long suture. The resulting mastectomy specimen was marked using a short stitch superiorly and a long stitch laterally. The breast was sent to pathology for permanent evaluation. The operative field was irrigated with normal saline and all bleeding points were secured with Bovie electrocautery.     The cavity was irrigated and hemostasis was obtained. Via a remote separate incision, a 15-[place] round Jama-Clark (CHOLO) was placed and secured with a 3-0 Nylon ariela sandal suture.    The subdermal layer was closed with 3-0 Monocryl and 4-0 subcuticular running skin closures and dermabond was applied followed by sterile dressings.    The Right Mastectomy  The right simple mastectomy was performed next. A large elliptical skin incision was made to right breast tissue that included the nipple-areolar complex. Flaps were raised in the avascular plane between subcutaneous tissue and breast tissue was used for to raise flaps from the clavicle  superiorly, the sternum medially, the anterior rectus sheath inferiorly, and the lateral border of the pectoralis major muscle laterally. Hemostasis was maintained in the flaps. Next, the breast tissue and underlying pectoralis fascia were excised from the pectoralis major muscle, progressing from medial to lateral. At the lateral border of the pectoralis major muscle, the breast tissue was sung laterally and a lateral pedicle identified where breast tissue gave way to fat of axilla. The lateral pedicle was incised, removed and the specimen was marked. Superiorly was a short suture and laterally was a long suture. The resulting mastectomy specimen was marked using a short stitch superiorly and a long stitch laterally. The breast was sent to pathology for permanent evaluation. The operative field was irrigated with normal saline and all bleeding points were secured with Bovie electrocautery.    Right [place] Lymph Node Biopsy was then performed  Using the hand-held SentiMag probe, activity was noted and localized in the right axilla. A small transverse inferior axillary incision over the area of activity. Dissection was carried down through the clavipectoral fascia using electrocautery. The lateral border of the pectoralis of the right axilla was excised and the axilla was entered. Using a hand-held SentiMag probe the axilla was assessed for a sentinel lymph node. A Brown afferent lymphatic channel coming from the breast upper outer quadrant to level 1 axillary lymph tissue. [place] lymph nodes were identified as follows:  Right axillary sentinel lymph node #1, Superparamagnetic iron oxide (MagTrace) identified and count: 9999  Right axillary sentinel lymph node #2, Palpable  Right axillary sentinel lymph node #3, Superparamagnetic iron oxide (MagTrace) identified and count: 9999  Right axillary sentinel lymph node #4, Superparamagnetic iron oxide (MagTrace) identified and count: 9999    The substances used for  sentinel lymph node biopsy in this patient: Superparamagnetic iron oxide (MagTrace)  Number of lymph nodes removed with MagTrace: 3  Number of lymph nodes removed with Isosulfan (or Methylene) blue dye and Radiotracer: 0  Number of lymph nodes removed with only Radiotracer signal: 0  Number of lymph nodes removed with only Isosulfan (or Methylene) blue dye: 0  Number of lymph nodes removed with only palpable: 1    The sentinel lymph nodes were not sent to Pathologist for intra-operative Frozen Section and permanent pathology review will be performed.    No further dissection was undertaken.      The cavity was irrigated and hemostasis was obtained. Via a remote separate incision, a 15-Maltese round Jama-Clark (CHOLO) was placed and secured with a 3-0 Nylon ariela sandal suture.    The subdermal layer was closed with 3-0 Monocryl and 4-0 subcuticular running skin closures and dermabond was applied followed by sterile dressings.    Significant Surgical Tasks Conducted by the Assistant(s), if Applicable: The skilled assistance of the Physician Assistant, Shavon Rizzo PA-C, was necessary for the successful completion of this case. She was essential for proper positioning of the patient, manipulation of instruments, proper exposure, manipulation of tissue, and wound closure.       ESTIMATED BLOOD LOSS: 25 ml    Implants: * No implants in log *    Specimens:   ID Type Source Tests Collected by Time Destination   A : left simple mastectomy  Tissue Breast, Left SPECIMEN TO PATHOLOGY Genie Josue MD 1/30/2023 1135    B : right simple mastectomy  Tissue Breast, Right SPECIMEN TO PATHOLOGY Genie Josue MD 1/30/2023 1135    C : right axillary sentinel node #1 hot & brown @ 9999 Tissue Lymph Node SPECIMEN TO PATHOLOGY Genie Josue MD 1/30/2023 1135    D : right axillary sentinel node #2 palpable  Tissue Lymph Node SPECIMEN TO PATHOLOGY Genie Josue MD 1/30/2023 1255    E : right axillary sentinel node #3  hot & brown @ 9999 Tissue Lymph Node SPECIMEN TO PATHOLOGY Genie Josue MD 1/30/2023 1256    F : right axillary sentinel node #4 hot & brown @ 9999 Tissue Lymph Node SPECIMEN TO PATHOLOGY Genie Josue MD 1/30/2023 1258                    Condition: Good    Disposition: PACU - hemodynamically stable.    Attestation: I performed the procedure.      [place] Node Biopsy for Breast Cancer  Operation performed with curative intent Yes   Tracer(s) used to identify sentinel nodes in the upfront surgery (non-neoadjuvant) setting N/A   Tracer(s) used to identify sentinel nodes in the neoadjuvant setting Superparamagnetic iron oxide   All nodes (colored or non-colored) present at the end of a dye-filled lymphatic channel were removed Yes   All significantly radioactive nodes were removed N/A   All palpably suspicious nodes were removed Yes   Biopsy-proven positive nodes marked with clips prior to chemotherapy were identified and removed N/A

## 2023-01-30 NOTE — CARE UPDATE
Received patient from the OR, she is asleep, respirations full-regular-deep-clear, hob up 30 degrees, oral airway in place-chin lift not necessary at present-nurse remains at bedside with constant assessment and observation.

## 2023-01-30 NOTE — ANESTHESIA POSTPROCEDURE EVALUATION
Anesthesia Post Evaluation    Patient: Rob Dennison    Procedure(s) Performed: Procedure(s) (LRB):  MASTECTOMY, SIMPLE  Bilateral (Bilateral)  BIOPSY, LYMPH NODE, SENTINEL Right MagTrace Injected in office SentiMag needed No NUC MED (Right)  LYMPHADENECTOMY, AXILLARY (Right)    Final Anesthesia Type: general      Patient location during evaluation: PACU  Level of consciousness: awake and alert  Post-procedure vital signs: reviewed and stable  Pain management: adequate  Airway patency: patent  EUGENE mitigation strategies: Multimodal analgesia  PONV status at discharge: No PONV  Anesthetic complications: no      Cardiovascular status: hemodynamically stable  Respiratory status: unassisted  Hydration status: euvolemic  Follow-up not needed.          Vitals Value Taken Time   /83 01/30/23 1409   Temp 36 °C (96.8 °F) 01/30/23 1340   Pulse 93 01/30/23 1409   Resp 17 01/30/23 1409   SpO2 98 % 01/30/23 1409   Vitals shown include unvalidated device data.      No case tracking events are documented in the log.      Pain/Jayden Score: Pain Rating Prior to Med Admin: 0 (1/30/2023  8:33 AM)  Jayden Score: 4 (1/30/2023  1:40 PM)

## 2023-01-30 NOTE — TRANSFER OF CARE
Anesthesia Transfer of Care Note    Patient: Rob Dennison    Procedure(s) Performed: Procedure(s) (LRB):  MASTECTOMY, SIMPLE  Bilateral (Bilateral)  BIOPSY, LYMPH NODE, SENTINEL Right MagTrace Injected in office SentiMag needed No NUC MED (Right)  LYMPHADENECTOMY, AXILLARY (Right)    Patient location: PACU    Anesthesia Type: general    Transport from OR: Transported from OR on room air with adequate spontaneous ventilation    Post pain: adequate analgesia    Post assessment: no apparent anesthetic complications    Post vital signs: stable    Level of consciousness: responds to stimulation    Nausea/Vomiting: no nausea/vomiting    Complications: none    Transfer of care protocol was followed      Last vitals:   Visit Vitals  /84   Pulse 88   Temp 37.1 °C (98.8 °F)   Resp 20   Ht 5' (1.524 m)   Wt 76.5 kg (168 lb 10.4 oz)   LMP 05/31/2022   SpO2 100%   Breastfeeding No   BMI 32.94 kg/m²

## 2023-01-30 NOTE — DISCHARGE INSTRUCTIONS
BREAST SURGERY POST-OP INSTRUCTIONS  DR. FRANK CHAN PA-C     How do I care for my incisions?  You and your caregiver should look at your incision daily. Call your doctor if you see any redness or drainage from your incision.  You will be given a support bra/wrap, wear this for 24 hours a day (unless showering or sponge bathing) for comfort and to help decrease amount of swelling. If the bra fits too loose or too tight you may go to your local store a purchase a front opening sports bra that fits snug.   Your incision will be closed with sutures (stitches) under your skin. These sutures dissolve on their own, so they do not need to be removed.  · If you go home with Steri-StripsTM on your incision, they will loosen and fall off by themselves. If they havent fallen off within 14 days, you may remove them.  · If you go home with glue over your sutures (stitches), it will also loosen and peel off, similarly to the Steri-Strips.  ** If you have had a Mastectomy and/or Reconstruction and/or Axillary Lymph Node Dissection:  You may have 1-2 Jama-Clark Drains in place. Please refer to the additional instructions discussing care of your drains.     Is it normal to feel new sensations?  As you are healing, you may feel a several different sensations in your breast. Tenderness, numbness, and twinges are common examples. These sensations usually come and go, and will lessen over time, usually within the first few months after surgery. As you continue to heal, you may feel scar tissue along your incision site. It will feel hard. This is common and will soften over the next several months.     Can I shower?  You can shower 48 hours after your surgery. Taking a warm shower is relaxing and can help decrease discomfort. Use soap when you shower and gently wash your incision. Pat the areas dry with a towel after showering, and leave your incision uncovered, unless you have drainage from your incision. If  you have drainage, call your doctors office.  Do not take tub baths, swim, or use hot tubs or saunas until you discuss it with your doctor at the first appointment after your surgery.    Will I have pain when I am home?  The length of time each person has pain or discomfort varies. You will be given a prescription for pain medication before you go home. Follow the guidelines below to manage your pain.  · Take your medication as directed and as needed.  · Call your doctor if the pain medication prescribed for you doesnt relieve your pain.  · Do not drive or drink alcohol while you are taking prescription pain medication.  · As your incision heals, you will have less pain and need less pain medication. A mild pain reliever such as acetaminophen (Tylenol) or ibuprofen (Advil) will relieve aches and discomfort. However, large quantities of acetaminophen may be harmful to your liver. Do not take more acetaminophen than the amount directed on the bottle or as instructed by your doctor or nurse.  · Pain medication should help you as you resume your normal activities. Pain medication is most effective 30 to 45 minutes after taking it.  · Keep track of when you take your pain medication. Taking it when your pain first begins is more effective than waiting for the pain to get worse.  Pain medication may cause constipation (having fewer bowel movements than what is normal for you).    How can I prevent constipation?  · Go to the bathroom at the same time every day. Your body will get used to going at that time.  · If you feel the urge to go, do not put it off. Try to use the bathroom 5 to 15 minutes after meals.  · After breakfast is a good time to move your bowels because the reflexes in your colon are strongest then.  · Exercise if you can; walking is an excellent form of exercise.  · Drink 8 (8-ounce) glasses (2 liters) of liquids daily, if you can. Drink water, juices, soups, ice cream shakes, and other drinks that do  not have caffeine. Beverages with caffeine, such as coffee and soda, pull fluid out of the body.  · Slowly increase the fiber in your diet to 25 to 35 grams per day. Fruits, vegetables, whole grains, and cereals contain fiber. If you have an ostomy or have had recent bowel surgery, check with your doctor or nurse before making any changes in your diet.  · Both over-the-counter and prescription medications are available to treat constipation. Start with 1 of the following over-the-counter medications first:  o Docusate sodium (Colace®) 100 mg. Take __1___ capsules __1___ time a day. This is a stool softener that causes few side effects. Do not take it with mineral oil.  o Polyethylene glycol (MiraLAX®) 17 grams daily.  o Senna (Senokot®) 2 tablets at bedtime. This is a stimulant laxative, which can cause cramping.  · If you havent had a bowel movement in 2 days, call your doctor or nurse.    Will I be able to eat?  You can resume eating when you go home after surgery. Eating a balanced diet high in protein will help you heal after surgery. Your diet should include a healthy protein source at each meal, as well as fruits, vegetables, and whole grains. If you have questions about your diet, ask to see a dietitian.    When is it safe for me to drive and what activities can I perform?  You may resume driving after surgery as long as you are not taking prescription pain medication that may make you drowsy, and you have your full range of motion.  You should not lift anything heavier than 10-15 lbs the first week. After this time, you may gradually increase the amount of weight. You want to take it easy the first 2 weeks, no strenuous or repetitive movements such as vacuuming or scrubbing. Walking is okay. Ask the doctor if you have questions.    How long until I have the pathology results?  The pathology report usually takes to 7 to 10 business days.    When is my first appointment after my surgery?  You should be given  or schedule a follow-up appointment 1 to 2 weeks after your surgery.    How can I cope with my feelings?   After surgery for a serious illness, you may have new and upsetting feelings. Many patients say they felt sad, worried, nervous, irritable, or angry at one time or another. You may find that you cannot control some of these feelings. If this happens, its a good idea to seek emotional support.  The first step in coping is to talk about how you feel. Family and friends can help. Your nurse, doctor, and  can reassure, support, and guide you. It is always a good idea to let these professionals know how you, your family, and your friends are feeling emotionally. Many resources are available to patients and their families. Whether you are in the hospital or at home, we are here to help you and your family and friends handle the emotional aspects of your illness.    What if I have other questions?  If you have any questions or concerns, please talk with your doctor or nurse. You can reach them Monday through Thursday from 9:00 AM to 5:00 PM and Friday from 9:00 AM to 12:00 PM at [phone].  After 5:00 PM M-Th or 12:00 PM Fri, during the weekend, and on holidays, please call [phone] and ask for the doctor on call.    PLEASE CALL YOUR DOCTOR OR NURSE IF YOU HAVE:  · A temperature of 101° F (38.3° C) or higher  · Shortness of breath  · Warmer than normal skin around your incision  · Increased discomfort in the area  · Increased redness around your incision  · New or increased swelling around your incision  · Discharge from your incision        Patient Education       Jama-Clark Drain   Why is this procedure done?   In some cases, fluid gathers in the wound area after a surgery. This fluid may raise the chance of infection. It also slows down the healing process. When this might happen, the doctor may put in a drain to bring the fluid outside the body.  A Jama-Clark, or CHOLO drain, may be used to  help get rid of the extra fluid from around the cut site. A CHOLO drain is made up of two parts. The first part is a thin rubber tube. The other part is a soft bulb with a removable stopper. The bulb acts as suction and a container to collect the fluid from the body.       What will the results be?   This drain gets rid of extra fluid from your cut site. This will help it to get better faster.  What happens before the procedure?   Talk to the doctor about all the drugs you are taking. Be sure to include all prescription and over-the-counter (OTC) drugs, and herbal supplements. Tell the doctor about any drug allergy. Bring a list of drugs you take with you.  Any bleeding problems. Be sure to tell your doctor if you are taking any drugs that may cause bleeding. Some of these are warfarin, rivaroxaban, apixaban, ticagrelor, clopidogrel, ketorolac, ibuprofen, naproxen, or aspirin. Certain vitamins and herbs, such as garlic and fish oil, may also add to the risk for bleeding. You may need to stop these drugs as well. Talk to your doctor about them.  Ask your doctor if you may eat or drink anything before the procedure.  You will not be allowed to drive right away after the procedure. Ask a family member or a friend to drive you home.  What happens during the procedure?   During surgery, your doctor will put one end of the rubber tube into the area where there is extra fluid. The tube will be held in place by a stitch in your skin.  The other end of the rubber tube is hooked to the bulb. The doctor will then remove the stopper. The doctor squeezes the bulb to get rid of the air. The stopper is put back on the bulb and this makes suction inside the drain. The excess fluid will be pulled out of your body.  It may only take a few minutes to put in the drain.  You will not be awake if the drain is put in during surgery.  What happens after the procedure?   You may be sore where the drain was put in. Your doctor will give you  drugs for the pain.  You may need to stay in the hospital until you are well enough to go home. Your doctor will watch to see how much fluid is in the bulb each day.  The nurses will empty the drain when it gets about half full or at certain times during the day and measure the amount of fluid emptied.  What care is needed at home?   Ask your doctor what you need to do when you go home. Make sure you ask questions if you do not understand what the doctor says. This way you will know what you need to do.  You will learn how to:  Wash your hands every time before and after you empty the drain or change your bandage.  Care for the skin around the site where the tube goes into your body.  Empty the drain. Remove the stopper at the end. Pour out the drainage. Your doctor may want you to measure how much drainage is in the bulb. Then, squeeze the bulb to get rid of air and put the stopper back in place.  Care for the tube if there is a clot in it. The doctor may tell you to squeeze the tube over the clot. You can also squeeze the tube from your skin to the bulb and then let it go. This should open the tube. Ask your doctor to show you how to do this before you go home.  Measure the amount of fluid in the drain ball after you empty it each day and write it down on a chart.  Look for signs of infection. Your doctor will want to know if you have a fever of 100.4°F (38°C) or higher, chills, if you get very red and sore around the drain, or if the fluid in the drain turns cloudy or smells. Your doctor will want to know if the skin around the drain has any fluid or pus coming out of it.  Sleep on the side opposite to the CHOLO drain. This prevents any kinking of the tubing or pulling out the suction bulb.  Avoid bumping the drain.  Your doctor may want you to hold the drain up with a safety pin while you are moving around. Ask your doctor to show you how to do this.  Talk to your doctor about when it is safe to take a bath or  shower. Ask your doctor about your activity level while you have your drain in place.  What follow-up care is needed?   Your doctor may ask you to make visits to the office to check on your progress. Be sure to keep these visits.  Your doctor will tell you when the drain may be removed.  What problems could happen?   Fluid leaking from the cut site  Bleeding  Infection  Clot in the tube  Tube and drain falls out  Cut area opens up  Drain stops working  When do I need to call the doctor?   Signs of infection at the drain site. These include swelling, redness, warmth around the wound, too much pain when touched, yellowish or greenish or bloody discharge, foul smell coming from the cut site.  Drain becomes loose, comes apart, abruptly stops draining, or falls out  Last Reviewed Date   2019-09-24  Consumer Information Use and Disclaimer   This information is not specific medical advice and does not replace information you receive from your health care provider. This is only a brief summary of general information. It does NOT include all information about conditions, illnesses, injuries, tests, procedures, treatments, therapies, discharge instructions or life-style choices that may apply to you. You must talk with your health care provider for complete information about your health and treatment options. This information should not be used to decide whether or not to accept your health care providers advice, instructions or recommendations. Only your health care provider has the knowledge and training to provide advice that is right for you.  Copyright   Copyright © 2021 [employer] and its affiliates and/or licensors. All rights reserved.

## 2023-01-30 NOTE — PLAN OF CARE
Patients discharge to her post-op phase II room approved per Dr. Hammond and per her Jayden score.

## 2023-01-30 NOTE — INTERVAL H&P NOTE
The patient has been examined and the H&P has been reviewed:    I concur with the findings and no changes have occurred since H&P was written.    Surgery risks, benefits and alternative options discussed and understood by patient/family.          All of questions were answered    Genie Josue MD  Breast Surgical Oncology

## 2023-01-30 NOTE — ANESTHESIA PREPROCEDURE EVALUATION
01/30/2023  Rob Dennison is a 42 y.o., female presenting for bilateral mastectomies.    Anesthesia History  No specialty history recorded    Other Medical History   Anxiety Depression   Hypertension Breast cancer in female   Menopause Anemia     Surgical History    BACK SURGERY ENDOSCOPIC RELEASE OF BOTH CARPAL TUNNELS   TRIGGER FINGER RELEASE CHOLECYSTECTOMY   WISDOM TOOTH EXTRACTION ABDOMINAL SURGERY       Pre-op Assessment    I have reviewed the Patient Summary Reports.     I have reviewed the Nursing Notes. I have reviewed the NPO Status.   I have reviewed the Medications.     Review of Systems  Anesthesia Hx:  No problems with previous Anesthesia    Social:  Non-Smoker    Cardiovascular:   Hypertension    Endocrine:  Obesity / BMI > 30  Psych:   Psychiatric History anxiety depression          Physical Exam  General: Well nourished, Cooperative, Alert and Oriented    Airway:  Mallampati: II   Mouth Opening: Normal  TM Distance: Normal  Tongue: Normal  Neck ROM: Normal ROM    Dental:  Intact    Chest/Lungs:  Clear to auscultation, Normal Respiratory Rate    Heart:  Rate: Normal  Rhythm: Regular Rhythm  Sounds: Normal    Abdomen:  Normal, Soft, Nontender        Anesthesia Plan  Type of Anesthesia, risks & benefits discussed:    Anesthesia Type: Gen ETT  Intra-op Monitoring Plan: Standard ASA Monitors  Post Op Pain Control Plan: multimodal analgesia  Induction:  IV  Airway Plan: Direct  Informed Consent: Informed consent signed with the Patient and all parties understand the risks and agree with anesthesia plan.  All questions answered.   ASA Score: 2  Day of Surgery Review of History & Physical: H&P Update referred to the surgeon/provider.    Ready For Surgery From Anesthesia Perspective.     .

## 2023-01-30 NOTE — CARE UPDATE
Patient awakened,jackson,rejected oral airway,oriented/reassured her, c/o of slight pain-will medicate per anesthesia orders, respirations full-regular-deep-clear,hob up to 40 degrees of elevation.

## 2023-01-30 NOTE — ANESTHESIA PROCEDURE NOTES
Intubation    Date/Time: 1/30/2023 10:56 AM  Performed by: Jose Chandler CRNA  Authorized by: Tony Hernandez DO     Intubation:     Induction:  Intravenous    Intubated:  Postinduction    Mask Ventilation:  Easy mask    Attempts:  1    Attempted By:  CRNA    Method of Intubation:  Direct    Blade:  Lyle 3    Laryngeal View Grade: Grade I - full view of cords      Difficult Airway Encountered?: No      Complications:  None    Airway Device:  Oral endotracheal tube    Airway Device Size:  7.0    Style/Cuff Inflation:  Cuffed (inflated to minimal occlusive pressure)    Tube secured:  22    Secured at:  The lips    Placement Verified By:  Capnometry    Complicating Factors:  None    Findings Post-Intubation:  BS equal bilateral and atraumatic/condition of teeth unchanged

## 2023-01-30 NOTE — BRIEF OP NOTE
[facility]  Brief Operative Note     SUMMARY     Surgery Date: 1/30/2023     Surgeon: Genie Josue MD    Assist: Shavon Rizzo PA-C    Medical Student: Leonor Lloyd MS-4    Pre-op Diagnosis:  Malignant neoplasm of central portion of right breast in female, estrogen receptor negative [C50.111, Z17.1]  Breast cancer, BRCA1 positive, right [C50.911, Z15.01]    Post-op Diagnosis:  Post-Op Diagnosis Codes:     * Malignant neoplasm of central portion of right breast in female, estrogen receptor negative [C50.111, Z17.1]     * Breast cancer, BRCA1 positive, right [C50.911, Z15.01]    Procedure(s) (LRB):  MASTECTOMY, SIMPLE  Bilateral (Bilateral)  BIOPSY, LYMPH NODE, SENTINEL Right MagTrace Injected in office SentiMag needed No NUC MED (Right)  LYMPHADENECTOMY, AXILLARY (Right)    Anesthesia: General    Findings/Key Components: Removal of her right breast cancer, prophylactic removal of her left breast, and right axillary sentinel lymph node biopsy    Estimated Blood Loss: 25 ml         Specimens:   ID Type Source Tests Collected by Time Destination   A : left simple mastectomy  Tissue Breast, Left SPECIMEN TO PATHOLOGY Genie Josue MD 1/30/2023 1135    B : right simple mastectomy  Tissue Breast, Right SPECIMEN TO PATHOLOGY Genie Josue MD 1/30/2023 1135    C : right axillary sentinel node #1 hot & brown @ 9999 Tissue Lymph Node SPECIMEN TO PATHOLOGY Genie Josue MD 1/30/2023 1135    D : right axillary sentinel node #2 palpable  Tissue Lymph Node SPECIMEN TO PATHOLOGY Genie Josue MD 1/30/2023 1255    E : right axillary sentinel node #3 hot & brown @ 9999 Tissue Lymph Node SPECIMEN TO PATHOLOGY Genie Josue MD 1/30/2023 1256    F : right axillary sentinel node #4 hot & brown @ 9999 Tissue Lymph Node SPECIMEN TO PATHOLOGY Genie Josue MD 1/30/2023 1258            Discharge Note    SUMMARY     Admit Date: 1/30/2023    Discharge Date and Time:  01/30/2023 1:30  PM    Hospital Course (synopsis of major diagnoses, care, treatment, and services provided during the course of the hospital stay): She is status post bilateral simple mastectomy with right axillary sentinel lymph node biopsy.     Final Diagnosis: Post-Op Diagnosis Codes:     * Malignant neoplasm of central portion of right breast in female, estrogen receptor negative [C50.111, Z17.1]     * Breast cancer, BRCA1 positive, right [C50.911, Z15.01]    Disposition: Home or Self Care    Follow Up/Patient Instructions:    Follow-up Information       [facility] Altman Follow up.    Specialty: Physician Assistant  Why: 2023 2:20 PM  Contact information:  [address] [place] [address]  [address]  [place] [id number] [phone]                             Medications:  Reconciled Home Medications:      Medication List        START taking these medications      traMADoL 50 mg tablet  Commonly known as: ULTRAM  Take 1 tablet (50 mg total) by mouth every 6 (six) hours as needed for Pain.            CONTINUE taking these medications      acyclovir 400 MG tablet  Commonly known as: ZOVIRAX  Take 400 mg by mouth once daily.     carvediloL 3.125 MG tablet  Commonly known as: COREG  SMARTSI Tablet(s) By Mouth Morning-Evening     cyanocobalamin 2000 MCG tablet  Take 2,000 mcg by mouth once daily.     * DULoxetine 60 MG capsule  Commonly known as: CYMBALTA  See Instructions, TAKE 1 CAPSULE BY MOUTH DAILY, # 90 cap(s), 1 Refill(s), Pharmacy: [id number], 155, cm, Height/Length Dosing, 21 13:16:00 CST, 79.83, kg, Weight Dosing, 21 13:16:00 CST     * DULOXETINE ORAL  Take 120 mg by mouth once daily.     fluticasone propionate 50 mcg/actuation nasal spray  Commonly known as: FLONASE  1 spray by Each Nostril route once daily.     hydrOXYzine pamoate 50 MG Cap  Commonly known as: VISTARIL  Take by mouth.     hyoscyamine 0.125 mg Subl  Commonly known as: LEVSIN/SL  DISSOLVE 1 TABLET UNDER THE TONGUE EVERY 6 HOURS AS  NEEDED FOR CRAMPING OR DIARRHEA     ipratropium 21 mcg (0.03 %) nasal spray  Commonly known as: ATROVENT  SMARTSI Spray(s) Both Nares     LIDOcaine VISCOUS 2 % Soln  Generic drug: LIDOcaine HCl 2%  SMARTSIG:10 Milliliter(s) By Mouth Every 3 Hours PRN     LIDOcaine-prilocaine cream  Commonly known as: EMLA  Apply topically as needed.     lisinopriL 10 MG tablet  Take 10 mg by mouth once daily.     loratadine 10 mg tablet  Commonly known as: CLARITIN  Take 10 mg by mouth once daily.     multivitamin per tablet  Commonly known as: THERAGRAN  Take 1 tablet by mouth once daily.     OLANZapine 2.5 MG tablet  Commonly known as: ZyPREXA  Take 2.5 mg by mouth every evening.     ondansetron 8 MG Tbdl  Commonly known as: ZOFRAN-ODT  8 mg.     pyridoxine (vitamin B6) 100 MG Tab  Commonly known as: B-6  Take 50 mg by mouth once daily.     spironolactone 100 MG tablet  Commonly known as: ALDACTONE  Take 100 mg by mouth every morning.     vitamin E 400 UNIT capsule  Take 400 Units by mouth once daily.           * This list has 2 medication(s) that are the same as other medications prescribed for you. Read the directions carefully, and ask your doctor or other care provider to review them with you.                Discharge Procedure Orders   Diet general     Lifting restrictions     Remove dressing in 48 hours     Call MD for:  temperature >100.4     Call MD for:  persistent nausea and vomiting     Call MD for:  severe uncontrolled pain     Call MD for:  difficulty breathing, headache or visual disturbances     Call MD for:  redness, tenderness, or signs of infection (pain, swelling, redness, odor or green/yellow discharge around incision site)     Call MD for:  hives     Call MD for:  persistent dizziness or light-headedness      Follow-up Information       RO[facility] Altman Follow up.    Specialty: Physician Assistant  Why: 2023 2:20 PM  Contact information:  [address] [place] [address]  [address]  [facility]  [address]  [phone]

## 2023-01-31 VITALS
HEART RATE: 90 BPM | TEMPERATURE: 99 F | HEIGHT: 60 IN | WEIGHT: 168.63 LBS | DIASTOLIC BLOOD PRESSURE: 86 MMHG | BODY MASS INDEX: 33.11 KG/M2 | OXYGEN SATURATION: 96 % | RESPIRATION RATE: 20 BRPM | SYSTOLIC BLOOD PRESSURE: 132 MMHG

## 2023-01-31 NOTE — PLAN OF CARE
Meets discharge criteria. Reports ready for discharge. Instructions given verbal & written to pt and pt S.O. , understanding verbalized. CHOLO drain teaching demonstrated and return demonstration done.     Discharged to home via w/c to private vehicle accompanied by attendant.

## 2023-02-02 ENCOUNTER — TELEPHONE (OUTPATIENT)
Dept: SURGERY | Facility: CLINIC | Age: 43
End: 2023-02-02
Payer: COMMERCIAL

## 2023-02-02 LAB — PSYCHE PATHOLOGY RESULT: NORMAL

## 2023-02-02 NOTE — TELEPHONE ENCOUNTER
Patient called. No answer, and unable to leave message.      ----- Message from Genie Josue MD sent at 2/2/2023  2:21 PM CST -----  Please call the patient and inform pathology results revealed the cancer was completely removed (No more cancer was seen in the breast tissue) and all lymph nodes were negative for cancer. Further discussion will be had at their post-op/follow-up appointment.

## 2023-02-03 ENCOUNTER — TELEPHONE (OUTPATIENT)
Dept: SURGERY | Facility: CLINIC | Age: 43
End: 2023-02-03
Payer: COMMERCIAL

## 2023-02-03 NOTE — TELEPHONE ENCOUNTER
Called patient. No answer, and unable to leave message.      ----- Message from Genie Josue MD sent at 2/2/2023  2:21 PM CST -----  Please call the patient and inform pathology results revealed the cancer was completely removed (No more cancer was seen in the breast tissue) and all lymph nodes were negative for cancer. Further discussion will be had at their post-op/follow-up appointment.

## 2023-02-07 ENCOUNTER — OFFICE VISIT (OUTPATIENT)
Dept: SURGERY | Facility: CLINIC | Age: 43
End: 2023-02-07
Payer: COMMERCIAL

## 2023-02-07 VITALS
HEIGHT: 60 IN | TEMPERATURE: 99 F | WEIGHT: 163.81 LBS | HEART RATE: 96 BPM | RESPIRATION RATE: 20 BRPM | SYSTOLIC BLOOD PRESSURE: 139 MMHG | BODY MASS INDEX: 32.16 KG/M2 | DIASTOLIC BLOOD PRESSURE: 90 MMHG | OXYGEN SATURATION: 97 %

## 2023-02-07 DIAGNOSIS — C50.911 BREAST CANCER, BRCA1 POSITIVE, RIGHT: ICD-10-CM

## 2023-02-07 DIAGNOSIS — Z15.01 BREAST CANCER, BRCA1 POSITIVE, RIGHT: ICD-10-CM

## 2023-02-07 DIAGNOSIS — C50.111 MALIGNANT NEOPLASM OF CENTRAL PORTION OF RIGHT BREAST IN FEMALE, ESTROGEN RECEPTOR NEGATIVE: Primary | ICD-10-CM

## 2023-02-07 DIAGNOSIS — Z17.1 MALIGNANT NEOPLASM OF CENTRAL PORTION OF RIGHT BREAST IN FEMALE, ESTROGEN RECEPTOR NEGATIVE: Primary | ICD-10-CM

## 2023-02-07 DIAGNOSIS — Z90.13 S/P BILATERAL MASTECTOMY: ICD-10-CM

## 2023-02-07 PROCEDURE — 99024 POSTOP FOLLOW-UP VISIT: CPT | Mod: S$GLB,,, | Performed by: PHYSICIAN ASSISTANT

## 2023-02-07 PROCEDURE — 3008F PR BODY MASS INDEX (BMI) DOCUMENTED: ICD-10-PCS | Mod: CPTII,S$GLB,, | Performed by: PHYSICIAN ASSISTANT

## 2023-02-07 PROCEDURE — 3075F PR MOST RECENT SYSTOLIC BLOOD PRESS GE 130-139MM HG: ICD-10-PCS | Mod: CPTII,S$GLB,, | Performed by: PHYSICIAN ASSISTANT

## 2023-02-07 PROCEDURE — 99999 PR PBB SHADOW E&M-EST. PATIENT-LVL V: CPT | Mod: PBBFAC,,, | Performed by: PHYSICIAN ASSISTANT

## 2023-02-07 PROCEDURE — 1159F PR MEDICATION LIST DOCUMENTED IN MEDICAL RECORD: ICD-10-PCS | Mod: CPTII,S$GLB,, | Performed by: PHYSICIAN ASSISTANT

## 2023-02-07 PROCEDURE — 99024 PR POST-OP FOLLOW-UP VISIT: ICD-10-PCS | Mod: S$GLB,,, | Performed by: PHYSICIAN ASSISTANT

## 2023-02-07 PROCEDURE — 3080F DIAST BP >= 90 MM HG: CPT | Mod: CPTII,S$GLB,, | Performed by: PHYSICIAN ASSISTANT

## 2023-02-07 PROCEDURE — 99999 PR PBB SHADOW E&M-EST. PATIENT-LVL V: ICD-10-PCS | Mod: PBBFAC,,, | Performed by: PHYSICIAN ASSISTANT

## 2023-02-07 PROCEDURE — 3075F SYST BP GE 130 - 139MM HG: CPT | Mod: CPTII,S$GLB,, | Performed by: PHYSICIAN ASSISTANT

## 2023-02-07 PROCEDURE — 3008F BODY MASS INDEX DOCD: CPT | Mod: CPTII,S$GLB,, | Performed by: PHYSICIAN ASSISTANT

## 2023-02-07 PROCEDURE — 3080F PR MOST RECENT DIASTOLIC BLOOD PRESSURE >= 90 MM HG: ICD-10-PCS | Mod: CPTII,S$GLB,, | Performed by: PHYSICIAN ASSISTANT

## 2023-02-07 PROCEDURE — 1159F MED LIST DOCD IN RCRD: CPT | Mod: CPTII,S$GLB,, | Performed by: PHYSICIAN ASSISTANT

## 2023-02-07 NOTE — PROGRESS NOTES
[facility] Breast Surg  Breast Surgical Oncology  Follow-Up Patient Office Visit       Referring Provider: No ref. provider found  PCP: Richa Waller MD   Care Team: No care team member to display No care team member to display     Chief Complaint:   Chief Complaint   Patient presents with    Post-op Evaluation     PostOp Exam for Bilateral Mastectomy surgery on 23, intermittent sharp LOQ pain, bilateral breast x 3 days, redness/swelling around Right CHOLO drain site started when CHOLO drain had a clogged blood clot, patient tried squeezing tubing and drained into bulb        Subjective:   Treatment History:  1. 2022-Genetic Testing- BRCA 1 mutation  2. Medical Oncology Referral to Dr. Marcy Del Valle  3. 2022 Mediport Placement  4. Neoadjuvant Chemotherapy - AC/Ketruda followed by weekly Taxol x 12 cycles (last dose of Taxol 2022) + Carboplatin/Keytruda (last dose of Keytruda 2022)    Interval History:  2023 - Rob Dennison presents today for post op. She is doing well. Pathology revealed a complete response to chemo.    HPI:  Rob Dennison is a 42 y.o. female who presents on 2022 for evaluation of newly diagnosed right breast cancer.     A detailed patient history was obtained and reviewed. She currently denies any breast issues including rashes, redness, pain, swelling, nipple discharge, or new lumps/masses.     Imagin. 3/9/2022 BL SC MG at [facility] - which revealed a a focal asymmetry in the right breast central to the nipple, posterior depth.    No other significant masses, calcifications, or other findings are seen in either breast.  BIRADS-0; additional imaging needed.     2. 2022 R DG MG/ R US BREAST LIMITED at [facility] - which revealed on R MG at central region posterior depth focal asymmetry recalled from screening mammography. On today's additional mammographic views, there are three adjacent oval equal density masses with indistinct margins  located in the central region far posterior depth. The most posterior of these three masses correlates with the focal asymmetry recalled from screening. No other significant mammographic finding.  On R US, at 7 o'clock to 9 o'clock and subareolar region revealed three adjacent oval hypoechoic masses with indistinct margins at the 9 o'clock  subareolar position, correlating with the three adjacent oval equal density masses with indistinct margins seen on today's mammogram. These three masses measure 0.5 x 0.5 x 0.4 cm, 0.9 x 0.9 x 0.8 cm, and 0.8 x 0.4 x 0.5 cm.   Incidentally seen at 7 o'clock to 8 o'clock  are several subcentimeter cysts of varying size and complication. These are benign. No suspicious right axillary adenopathy. BIRADS-4 suspicious; biopsy recommended.    3. 6/8/2022 Xray Right Shoulder at [facility] - which revealed no acute osseous abnormality.     4. 6/9/2022 BL BREAST MRI at [facility] - which revealed in R breast, 9 o'clock subareolar right breast, posterior depth, there is a 2.7 x 1.5 x 1.6 cm oval mass with non circumscribed margins which demonstrates rapid, washout kinetics (axial image 333).  There is a signal void consistent with a T3 coil shaped HydroMARK clip within this known malignancy.  In the subareolar right breast, anterior to middle depth, there is a 0.7 x 0.3 x 0.7 cm oval, enhancing mass with circumscribed margins (axial image 335).  This mass is 3.5 cm anterior to the known malignancy and 2.8 cm deep to the nipple.   Inferomedial to the known malignancy, in the lower inner quadrant of the right breast, posterior depth, there are 2 areas of clumped, non mass enhancement.  The deeper of the 2 areas of clumped, non mass enhancement spans 0.8 cm (axial image 341) and is 1.4 cm anterior to the pectoralis major muscle.  The more superficial area of clumped, non mass enhancement spans 0.7 cm (axial image 342).   Superomedial to the known malignancy, in the upper inner quadrant of the right breast,  middle depth, there is linear, non mass enhancement spanning 1.7 cm anterior to posterior (axial image 327).  In total the abnormal enhancement spans 7.6 cm anterior to posterior.  There is no skin thickening or nipple retraction.  No axillary or internal mammary lymphadenopathy is identified.  In the L breast, there is no suspicious areas of enhancement or areas of architectural distortion are seen.  There is no skin thickening or nipple retraction.  No axillary or internal mammary lymphadenopathy is identified.  BIRADS-4 suspicious     Pathology:  1. 5/1/2022 Ultrasound-guided Core Needle Biopsy Right Breast Mass 9 o'clock Subareolar-  -Invasive ductal carcinoma, grade 3 (measuring 5.0mm)  ER 1%  CA 0%  HER2 Negative  Ki67 63%    2. 1/30/2023 Bilateral simple mastectomy and Right sentinel lymph node biopsy - Right mastectomy revealed stromal sclerosis with numerous hemosiderin-laden macrophages, negative for residual carcinoma (complete pathological response - see comment). 6 sentinel lymph nodes negative for metastatic carcinoma.    Comment: This patient's history of a previous core biopsy demonstrating a high grade   invasive ductal carcinoma (ER 1%, CA -, HER2- and Ki-67 63%) is noted. Review of   the electronic medical record reveals that this 27 mm mass was at the 9 o'clock   subareolar position, posterior depth. This region has been extensively sampled,   including 25 additional `gross recuts`.  There is no residual carcinoma. A zone   of sclerosis with numerous hemosiderin-laden macrophages represents the prior   biopsy site. The axillary lymph nodes likewise have abundant hemosiderin-laden   macrophages in the subcapsular sinusoids. The findings are indicative of a   complete pathologic response to neoadjuvant therapy.      OB/GYN History:  Age at Menarche Onset: 10  Menopausal Status: premenopausal, LMP: 5/25/2022   Hysterectomy/Oophorectomy: no,n/a  Hormonal birth control (duration): Yes OCP  (estrogen/progesterone).   Pregnancy History:   Age at first live birth: 1  Hormone Replacement Therapy: No, none  Patient admits to nipple discharge. Described as bilateral and clear after having first mammogram which has resolved   Patient denies to previous breast biopsy.   Patient denies to a personal history of breast cancer.  MG breast density: Category C (Heterogeneously dense)     Other Relevant History:  Prior thoracic RT: none  Genetic testing: yes  Ashkenazi [employer] descent: No     Family History:  Father - Skin cancer possible melanoma at age 80  Paternal Grandfather - colon cancer at age 70     Past History:  Past Medical History:   Diagnosis Date    Anemia     Oct. Nov of 2022    Anxiety     Breast cancer in female     right    Depression     Hypertension     Menopause     Hot flashes began before chemo in July        Past Surgical History:   Procedure Laterality Date    ABDOMINAL SURGERY      Gall bladder removal     AXILLARY NODE DISSECTION Right 2023    Procedure: LYMPHADENECTOMY, AXILLARY;  Surgeon: Genie Josue MD;  Location: [facility] OR;  Service: General;  Laterality: Right;    BACK SURGERY      CHOLECYSTECTOMY      ENDOSCOPIC RELEASE OF BOTH CARPAL TUNNELS Bilateral     SENTINEL LYMPH NODE BIOPSY Right 2023    Procedure: BIOPSY, LYMPH NODE, SENTINEL Right MagTrace Injected in office SentiMag needed No NUC MED;  Surgeon: Genie Josue MD;  Location: [facility] OR;  Service: General;  Laterality: Right;  MagTrace Injected in office  SentiMag needed  No NUC MED    SIMPLE MASTECTOMY Bilateral 2023    Procedure: MASTECTOMY, SIMPLE  Bilateral;  Surgeon: Genie Josue MD;  Location: [facility] OR;  Service: General;  Laterality: Bilateral;    SPINE SURGERY      Shaved herniated disc    TRIGGER FINGER RELEASE Right     WISDOM TOOTH EXTRACTION          Social History     Socioeconomic History    Marital status:    Tobacco Use    Smoking status: Never    Smokeless tobacco:  Never   Substance and Sexual Activity    Alcohol use: Yes     Comment: Occasionally    Drug use: Never    Sexual activity: Yes     Partners: Female     Birth control/protection: None        Body mass index is 31.99 kg/m².     Allergy/Medications:   Review of patient's allergies indicates:   Allergen Reactions    Fosaprepitant Other (See Comments)     Flushing, nausea, abdominal discomfort    Adhesive     Erythromycin base Other (See Comments) and Rash     Fever          Current Outpatient Medications:     acyclovir (ZOVIRAX) 400 MG tablet, Take 400 mg by mouth once daily., Disp: , Rfl:     carvediloL (COREG) 3.125 MG tablet, SMARTSI Tablet(s) By Mouth Morning-Evening, Disp: , Rfl:     cyanocobalamin 2000 MCG tablet, Take 2,000 mcg by mouth once daily., Disp: , Rfl:     DULoxetine (CYMBALTA) 60 MG capsule,  See Instructions, TAKE 1 CAPSULE BY MOUTH DAILY, # 90 cap(s), 1 Refill(s), Pharmacy: [id number], 155, cm, Height/Length Dosing, 21 13:16:00 CST, 79.83, kg, Weight Dosing, 21 13:16:00 CST, Disp: , Rfl:     duloxetine HCl (DULOXETINE ORAL), Take 120 mg by mouth once daily., Disp: , Rfl:     fluticasone propionate (FLONASE) 50 mcg/actuation nasal spray, 1 spray by Each Nostril route once daily., Disp: , Rfl:     hydrOXYzine pamoate (VISTARIL) 50 MG Cap, Take by mouth., Disp: , Rfl:     hyoscyamine (LEVSIN/SL) 0.125 mg Subl, DISSOLVE 1 TABLET UNDER THE TONGUE EVERY 6 HOURS AS NEEDED FOR CRAMPING OR DIARRHEA, Disp: , Rfl:     ipratropium (ATROVENT) 21 mcg (0.03 %) nasal spray, SMARTSI Spray(s) Both Nares, Disp: , Rfl:     LIDOCAINE VISCOUS 2 % solution, SMARTSIG:10 Milliliter(s) By Mouth Every 3 Hours PRN, Disp: , Rfl:     LIDOcaine-prilocaine (EMLA) cream, Apply topically as needed., Disp: , Rfl:     lisinopriL 10 MG tablet, Take 10 mg by mouth once daily., Disp: , Rfl:     loratadine (CLARITIN) 10 mg tablet, Take 10 mg by mouth once daily., Disp: , Rfl:     multivitamin (THERAGRAN)  per tablet, Take 1 tablet by mouth once daily., Disp: , Rfl:     OLANZapine (ZYPREXA) 2.5 MG tablet, Take 2.5 mg by mouth every evening., Disp: , Rfl:     ondansetron (ZOFRAN-ODT) 8 MG TbDL, 8 mg., Disp: , Rfl:     pyridoxine, vitamin B6, (B-6) 100 MG Tab, Take 50 mg by mouth once daily., Disp: , Rfl:     spironolactone (ALDACTONE) 100 MG tablet, Take 100 mg by mouth every morning., Disp: , Rfl:     traMADoL (ULTRAM) 50 mg tablet, Take 1 tablet (50 mg total) by mouth every 6 (six) hours as needed for Pain., Disp: 28 tablet, Rfl: 0    vitamin E 400 UNIT capsule, Take 400 Units by mouth once daily., Disp: , Rfl:   No current facility-administered medications for this visit.    Facility-Administered Medications Ordered in Other Visits:     scopolamine 1.3-1.5 mg (1 mg over 3 days) 1 patch, 1 patch, Transdermal, Q3 Days, Sridhar Mao MD, 1 patch at 07/01/22 0624       Review of Systems:  Constitutional: denies fevers, chills, weight loss  HEENT: denies blurry/double vision, changes in hearing, odynophagia, dysphagia  Respiratory: denies cough, shortness of breath  Cardiovascular: denies palpitations, swelling of the extremities  GI: denies abdominal pain, nausea/vomiting, hematochezia, frequent stools  : denies frequency, dysuria, flank pain, hematuria  Skin: denies new rashes  Neurological: denies muscular/sensory deficiencies, loss of coordination, headaches, memory changes  Endo: denies hair loss/thinning, nervousness, hot flashes, heat/cold intolerance, lumps in the neck area  Heme: denies easy bruising and fatigue  Psychological: denies anxious/depressive moods  Musculoskeletal: denies bony pain, muscle cramps, swollen joints    Objective:     Vitals:  Blood pressure (!) 139/90, pulse 96, temperature 98.6 °F (37 °C), temperature source Oral, resp. rate 20, height 5' (1.524 m), weight 74.3 kg (163 lb 12.8 oz), SpO2 97 %.    Physical Exam:  General: The patient is awake, alert and oriented times three. The  patient is well nourished and in no acute distress.    Musculoskeletal: The patient has a normal range of motion of her bilateral upper extremities.    Breast: The mastectomy incisions are healing well. No tenderness, swelling, or redness. Resolving ecchymosis present. Bilateral drains in place with serosanguineous fluid in the CHOLO bulb.  Integumentary: no rashes or skin lesions present  Neurologic: cranial nerves intact, no signs of peripheral neurological deficit, motor/sensory function intact      Assessment and Plan:     Encounter Diagnoses   Name Primary?    Malignant neoplasm of central portion of right breast in female, estrogen receptor negative Yes    Breast cancer, BRCA1 positive, right     S/P bilateral mastectomy        Pathology discussed in detail. She is doing well post op. The drains are not ready for removal yet.     Plan:       RTC on Friday for possible drain removal. If output > 30 mL for 2 days in a row at time of visit, will recommend rescheduling until next Monday.    RTC in 3 months for CBE and to begin breast cancer surveillance s/p mastectomy.    Follow up with medical oncology for adjuvant treatments.      All of her questions were answered. She was advised to call if she develops any questions or concerns.    Shavon Rizzo PA-C          OFFICE VISIT CODING: Post Op

## 2023-02-13 ENCOUNTER — CLINICAL SUPPORT (OUTPATIENT)
Dept: SURGERY | Facility: CLINIC | Age: 43
End: 2023-02-13
Payer: COMMERCIAL

## 2023-02-13 VITALS
HEART RATE: 84 BPM | BODY MASS INDEX: 32.04 KG/M2 | TEMPERATURE: 98 F | SYSTOLIC BLOOD PRESSURE: 109 MMHG | WEIGHT: 163.19 LBS | RESPIRATION RATE: 18 BRPM | OXYGEN SATURATION: 97 % | DIASTOLIC BLOOD PRESSURE: 72 MMHG | HEIGHT: 60 IN

## 2023-02-13 DIAGNOSIS — Z48.03 ENCOUNTER FOR CHANGE OR REMOVAL OF DRAINS: Primary | ICD-10-CM

## 2023-02-13 PROCEDURE — 99999 PR PBB SHADOW E&M-EST. PATIENT-LVL V: CPT | Mod: PBBFAC,,,

## 2023-02-13 PROCEDURE — 99999 PR PBB SHADOW E&M-EST. PATIENT-LVL V: ICD-10-PCS | Mod: PBBFAC,,,

## 2023-02-13 NOTE — PROGRESS NOTES
Patient seen in office today for CHOLO drain removal.  After reviewing Cholo output sheet, both drains output are too high to remove them today.  Output is as follows: 2/10/23 40 mls in both; 2/11/23 R -45mls; L 30 mls; 2/12/23 R-35mls; L-30 mls.  Instructed patient to start recording output once a day if she can.  Instructed that output has to be less than 30 mls per day for 2 consecutive days.  Mastectomy incisions intact, well-approximated.  Steri strips to left mastectomy site removed.  Steri strips to R incision left in place.  Patient and I will speak on Wednesday morning to discuss output.  She verbalized understanding.

## 2023-02-14 DIAGNOSIS — Z11.59 NEED FOR HEPATITIS C SCREENING TEST: ICD-10-CM

## 2023-02-14 DIAGNOSIS — Z13.6 SCREENING FOR ISCHEMIC HEART DISEASE: ICD-10-CM

## 2023-02-14 DIAGNOSIS — Z00.00 WELLNESS EXAMINATION: Primary | ICD-10-CM

## 2023-02-14 DIAGNOSIS — R73.03 PREDIABETES: ICD-10-CM

## 2023-02-14 DIAGNOSIS — Z11.4 SCREENING FOR HIV (HUMAN IMMUNODEFICIENCY VIRUS): ICD-10-CM

## 2023-02-16 ENCOUNTER — CLINICAL SUPPORT (OUTPATIENT)
Dept: SURGERY | Facility: CLINIC | Age: 43
End: 2023-02-16
Payer: COMMERCIAL

## 2023-02-16 VITALS
OXYGEN SATURATION: 98 % | RESPIRATION RATE: 18 BRPM | TEMPERATURE: 98 F | BODY MASS INDEX: 31.96 KG/M2 | HEIGHT: 60 IN | DIASTOLIC BLOOD PRESSURE: 76 MMHG | HEART RATE: 86 BPM | WEIGHT: 162.81 LBS | SYSTOLIC BLOOD PRESSURE: 115 MMHG

## 2023-02-16 DIAGNOSIS — Z48.03 ENCOUNTER FOR CHANGE OR REMOVAL OF DRAINS: Primary | ICD-10-CM

## 2023-02-16 PROCEDURE — 99999 PR PBB SHADOW E&M-EST. PATIENT-LVL V: CPT | Mod: PBBFAC,,,

## 2023-02-16 PROCEDURE — 99999 PR PBB SHADOW E&M-EST. PATIENT-LVL V: ICD-10-PCS | Mod: PBBFAC,,,

## 2023-02-16 NOTE — PROGRESS NOTES
Patient seen in office today for CHOLO drain removal.  CHOLO output has been 30 mls or less for 2 consecutive days.  Bilateral CHOLO drains removed without difficulty.  Patient tolerated procedure well. CHOLO drain insertion sites covered with tape and guaze. Steri strip to right mastectomy site removed.  Mastectomy incisions well-approximated.Instructed patient to wear binder for another week day and night. Light activity for the next week. Showers only for the next week or two.  Patient verbalized understanding.

## 2023-02-17 ENCOUNTER — LAB VISIT (OUTPATIENT)
Dept: LAB | Facility: HOSPITAL | Age: 43
End: 2023-02-17
Attending: INTERNAL MEDICINE
Payer: COMMERCIAL

## 2023-02-17 DIAGNOSIS — Z11.4 SCREENING FOR HIV (HUMAN IMMUNODEFICIENCY VIRUS): ICD-10-CM

## 2023-02-17 DIAGNOSIS — Z00.00 WELLNESS EXAMINATION: ICD-10-CM

## 2023-02-17 DIAGNOSIS — Z13.6 SCREENING FOR ISCHEMIC HEART DISEASE: ICD-10-CM

## 2023-02-17 DIAGNOSIS — Z11.59 NEED FOR HEPATITIS C SCREENING TEST: ICD-10-CM

## 2023-02-17 DIAGNOSIS — R73.03 PREDIABETES: ICD-10-CM

## 2023-02-17 LAB
ALBUMIN SERPL-MCNC: 3.7 G/DL (ref 3.5–5)
ALBUMIN/GLOB SERPL: 1.2 RATIO (ref 1.1–2)
ALP SERPL-CCNC: 88 UNIT/L (ref 40–150)
ALT SERPL-CCNC: 34 UNIT/L (ref 0–55)
AST SERPL-CCNC: 30 UNIT/L (ref 5–34)
BASOPHILS # BLD AUTO: 0.04 X10(3)/MCL (ref 0–0.2)
BASOPHILS NFR BLD AUTO: 0.7 %
BILIRUBIN DIRECT+TOT PNL SERPL-MCNC: 0.4 MG/DL
BUN SERPL-MCNC: 9 MG/DL (ref 7–18.7)
CALCIUM SERPL-MCNC: 9.7 MG/DL (ref 8.4–10.2)
CHLORIDE SERPL-SCNC: 104 MMOL/L (ref 98–107)
CHOLEST SERPL-MCNC: 229 MG/DL
CHOLEST/HDLC SERPL: 4 {RATIO} (ref 0–5)
CO2 SERPL-SCNC: 30 MMOL/L (ref 22–29)
CREAT SERPL-MCNC: 0.8 MG/DL (ref 0.55–1.02)
EOSINOPHIL # BLD AUTO: 0.18 X10(3)/MCL (ref 0–0.9)
EOSINOPHIL NFR BLD AUTO: 3.4 %
ERYTHROCYTE [DISTWIDTH] IN BLOOD BY AUTOMATED COUNT: 13.8 % (ref 11.5–17)
EST. AVERAGE GLUCOSE BLD GHB EST-MCNC: 96.8 MG/DL
GFR SERPLBLD CREATININE-BSD FMLA CKD-EPI: >60 MLS/MIN/1.73/M2
GLOBULIN SER-MCNC: 3.2 GM/DL (ref 2.4–3.5)
GLUCOSE SERPL-MCNC: 96 MG/DL (ref 74–100)
HBA1C MFR BLD: 5 %
HCT VFR BLD AUTO: 36.6 % (ref 37–47)
HCV AB SERPL QL IA: NONREACTIVE
HDLC SERPL-MCNC: 51 MG/DL (ref 35–60)
HGB BLD-MCNC: 12.4 GM/DL (ref 12–16)
HIV 1+2 AB+HIV1 P24 AG SERPL QL IA: NONREACTIVE
IMM GRANULOCYTES # BLD AUTO: 0.01 X10(3)/MCL (ref 0–0.04)
IMM GRANULOCYTES NFR BLD AUTO: 0.2 %
LDLC SERPL CALC-MCNC: 136 MG/DL (ref 50–140)
LYMPHOCYTES # BLD AUTO: 1.03 X10(3)/MCL (ref 0.6–4.6)
LYMPHOCYTES NFR BLD AUTO: 19.2 %
MCH RBC QN AUTO: 32 PG
MCHC RBC AUTO-ENTMCNC: 33.9 MG/DL (ref 33–36)
MCV RBC AUTO: 94.6 FL (ref 80–94)
MONOCYTES # BLD AUTO: 0.61 X10(3)/MCL (ref 0.1–1.3)
MONOCYTES NFR BLD AUTO: 11.4 %
NEUTROPHILS # BLD AUTO: 3.5 X10(3)/MCL (ref 2.1–9.2)
NEUTROPHILS NFR BLD AUTO: 65.1 %
NRBC BLD AUTO-RTO: 0 %
PLATELET # BLD AUTO: 363 X10(3)/MCL (ref 130–400)
PMV BLD AUTO: 8.9 FL (ref 7.4–10.4)
POTASSIUM SERPL-SCNC: 4.4 MMOL/L (ref 3.5–5.1)
PROT SERPL-MCNC: 6.9 GM/DL (ref 6.4–8.3)
RBC # BLD AUTO: 3.87 X10(6)/MCL (ref 4.2–5.4)
SODIUM SERPL-SCNC: 141 MMOL/L (ref 136–145)
TRIGL SERPL-MCNC: 211 MG/DL (ref 37–140)
VLDLC SERPL CALC-MCNC: 42 MG/DL
WBC # SPEC AUTO: 5.4 X10(3)/MCL (ref 4.5–11.5)

## 2023-02-17 PROCEDURE — 80061 LIPID PANEL: CPT

## 2023-02-17 PROCEDURE — 87389 HIV-1 AG W/HIV-1&-2 AB AG IA: CPT

## 2023-02-17 PROCEDURE — 83036 HEMOGLOBIN GLYCOSYLATED A1C: CPT

## 2023-02-17 PROCEDURE — 36415 COLL VENOUS BLD VENIPUNCTURE: CPT

## 2023-02-17 PROCEDURE — 80053 COMPREHEN METABOLIC PANEL: CPT

## 2023-02-17 PROCEDURE — 86803 HEPATITIS C AB TEST: CPT

## 2023-02-17 PROCEDURE — 85025 COMPLETE CBC W/AUTO DIFF WBC: CPT

## 2023-02-22 ENCOUNTER — TELEPHONE (OUTPATIENT)
Dept: INTERNAL MEDICINE | Facility: CLINIC | Age: 43
End: 2023-02-22
Payer: COMMERCIAL

## 2023-02-27 ENCOUNTER — OFFICE VISIT (OUTPATIENT)
Dept: INTERNAL MEDICINE | Facility: CLINIC | Age: 43
End: 2023-02-27
Payer: COMMERCIAL

## 2023-02-27 VITALS
SYSTOLIC BLOOD PRESSURE: 108 MMHG | HEART RATE: 76 BPM | DIASTOLIC BLOOD PRESSURE: 68 MMHG | OXYGEN SATURATION: 99 % | HEIGHT: 60 IN | WEIGHT: 162 LBS | BODY MASS INDEX: 31.8 KG/M2 | RESPIRATION RATE: 18 BRPM

## 2023-02-27 DIAGNOSIS — Z15.01 BRCA1-ASSOCIATED PROTEIN-1 TUMOR PREDISPOSITION SYNDROME: ICD-10-CM

## 2023-02-27 DIAGNOSIS — I42.7 CARDIOTOXICITY: ICD-10-CM

## 2023-02-27 DIAGNOSIS — Z15.09 BRCA1-ASSOCIATED PROTEIN-1 TUMOR PREDISPOSITION SYNDROME: ICD-10-CM

## 2023-02-27 DIAGNOSIS — Z00.00 WELLNESS EXAMINATION: ICD-10-CM

## 2023-02-27 DIAGNOSIS — E66.01 MORBID (SEVERE) OBESITY DUE TO EXCESS CALORIES: ICD-10-CM

## 2023-02-27 DIAGNOSIS — F32.A DEPRESSION, UNSPECIFIED DEPRESSION TYPE: ICD-10-CM

## 2023-02-27 DIAGNOSIS — I10 ESSENTIAL HYPERTENSION: ICD-10-CM

## 2023-02-27 DIAGNOSIS — E66.09 CLASS 2 OBESITY DUE TO EXCESS CALORIES WITHOUT SERIOUS COMORBIDITY WITH BODY MASS INDEX (BMI) OF 35.0 TO 35.9 IN ADULT: ICD-10-CM

## 2023-02-27 DIAGNOSIS — Z15.02 BRCA1-ASSOCIATED PROTEIN-1 TUMOR PREDISPOSITION SYNDROME: ICD-10-CM

## 2023-02-27 DIAGNOSIS — B00.9 HSV-2 INFECTION: ICD-10-CM

## 2023-02-27 PROBLEM — F41.9 ANXIETY: Status: ACTIVE | Noted: 2022-10-21

## 2023-02-27 PROBLEM — R79.89 ELEVATED TROPONIN: Status: ACTIVE | Noted: 2022-11-04

## 2023-02-27 PROBLEM — E66.812 CLASS 2 OBESITY DUE TO EXCESS CALORIES WITHOUT SERIOUS COMORBIDITY WITH BODY MASS INDEX (BMI) OF 35.0 TO 35.9 IN ADULT: Status: ACTIVE | Noted: 2022-07-30

## 2023-02-27 PROBLEM — R60.0 BILATERAL LOWER EXTREMITY EDEMA: Status: ACTIVE | Noted: 2022-10-28

## 2023-02-27 PROBLEM — C50.411 MALIGNANT NEOPLASM OF UPPER-OUTER QUADRANT OF RIGHT BREAST IN FEMALE, ESTROGEN RECEPTOR NEGATIVE: Status: ACTIVE | Noted: 2022-06-02

## 2023-02-27 PROBLEM — L60.8 CHANGE IN NAIL APPEARANCE: Status: ACTIVE | Noted: 2022-12-22

## 2023-02-27 PROBLEM — R68.89 COLD SENSITIVITY: Status: ACTIVE | Noted: 2022-10-28

## 2023-02-27 PROBLEM — R93.1 ECHOCARDIOGRAM ABNORMAL: Status: ACTIVE | Noted: 2022-10-07

## 2023-02-27 PROBLEM — R79.89 ELEVATED TROPONIN: Status: RESOLVED | Noted: 2022-11-04 | Resolved: 2023-02-27

## 2023-02-27 PROCEDURE — 1160F PR REVIEW ALL MEDS BY PRESCRIBER/CLIN PHARMACIST DOCUMENTED: ICD-10-PCS | Mod: CPTII,,, | Performed by: INTERNAL MEDICINE

## 2023-02-27 PROCEDURE — 3078F PR MOST RECENT DIASTOLIC BLOOD PRESSURE < 80 MM HG: ICD-10-PCS | Mod: CPTII,,, | Performed by: INTERNAL MEDICINE

## 2023-02-27 PROCEDURE — 1160F RVW MEDS BY RX/DR IN RCRD: CPT | Mod: CPTII,,, | Performed by: INTERNAL MEDICINE

## 2023-02-27 PROCEDURE — 3008F BODY MASS INDEX DOCD: CPT | Mod: CPTII,,, | Performed by: INTERNAL MEDICINE

## 2023-02-27 PROCEDURE — 3078F DIAST BP <80 MM HG: CPT | Mod: CPTII,,, | Performed by: INTERNAL MEDICINE

## 2023-02-27 PROCEDURE — 3074F SYST BP LT 130 MM HG: CPT | Mod: CPTII,,, | Performed by: INTERNAL MEDICINE

## 2023-02-27 PROCEDURE — 1159F PR MEDICATION LIST DOCUMENTED IN MEDICAL RECORD: ICD-10-PCS | Mod: CPTII,,, | Performed by: INTERNAL MEDICINE

## 2023-02-27 PROCEDURE — 1159F MED LIST DOCD IN RCRD: CPT | Mod: CPTII,,, | Performed by: INTERNAL MEDICINE

## 2023-02-27 PROCEDURE — 99396 PREV VISIT EST AGE 40-64: CPT | Mod: ,,, | Performed by: INTERNAL MEDICINE

## 2023-02-27 PROCEDURE — 3074F PR MOST RECENT SYSTOLIC BLOOD PRESSURE < 130 MM HG: ICD-10-PCS | Mod: CPTII,,, | Performed by: INTERNAL MEDICINE

## 2023-02-27 PROCEDURE — 3008F PR BODY MASS INDEX (BMI) DOCUMENTED: ICD-10-PCS | Mod: CPTII,,, | Performed by: INTERNAL MEDICINE

## 2023-02-27 PROCEDURE — 99396 PR PREVENTIVE VISIT,EST,40-64: ICD-10-PCS | Mod: ,,, | Performed by: INTERNAL MEDICINE

## 2023-02-27 NOTE — ASSESSMENT & PLAN NOTE
Health Maintenance Due   Topic Date Due    Pneumococcal Vaccines (Age 0-64) (1 - PCV) Never done    COVID-19 Vaccine (3 - Booster for Pfizer series) 06/29/2021    Influenza Vaccine (1) 09/01/2022     Recent labs reviewed and discussed.  Declined pneumonia vaccine.

## 2023-02-27 NOTE — PROGRESS NOTES
Subjective:      Chief Complaint: Annual Exam (Discuss labs )      HPI:  She is here for her annual wellness visit.  Since our last visit she was dx'ed with breast cancer, triple negative.  She completed chemo in  and had a double mastectomy at the end of January.  She is feeling much better.  She has an appt with Dr. Winslow this afternoon to discuss prophylactic hysterectomy.  She is starting to feel better.  She isn't checking her BP at home.  She is slowly exercising.  She had sentinel node biopsy on the right side, all nodes negative.    Her mood is ok.  She is on keytruda, which she plans to stay on every 3 weeks for a year.    She isn't currently taking the spironolactone.  She was taking it for acne and facial hair.    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     Change in Nail Appearance 12/22/2022   Depression 12/22/2022   Cardiotoxicity 11/1/2022    Formatting of this note is different from the original.  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  Cont coreg, lisinopril.     Malignant Neoplasm of Upper-Outer Quadrant of Right Breast in Female, Estrogen Receptor Negative 6/2/2022    Last Assessment & Plan:   Formatting of this note might be different from the original.  · History & Physical Had first dose of chemotherapy on 1/8/2022 and it is mentioned in onc notes that she was to receive Neulasta though I do not see that it was actually  administered.  Consider discussing case with heme-onc.    Discharge Summary  Patient with known history of stage IIIb triple negative breast cancer status postchemotherapy on 2022, complicated by febrile neutropenia.  She has close outpatient follow-up scheduled with Dr. Del Valle this week.     Follow-up Patient with known known history of stage IIb triple negative breast cancer status postchemotherapy 2022, complicated by neutropenia..  Will need close outpatient follow-up with clinic with Dr. Del Valle     Elevated Troponin (Resolved) 2022        The patient's Health Maintenance was reviewed and the following appears to be due:   Health Maintenance Due   Topic Date Due    Pneumococcal Vaccines (Age 0-64) (1 - PCV) Never done    COVID-19 Vaccine (3 - Booster for Pfizer series) 2021    Influenza Vaccine (1) 2022       Past Medical History:  Past Medical History:   Diagnosis Date    Anemia     Oct. Nov of 2022    Anxiety     Breast cancer in female     right    Depression     Elevated troponin 2022    Hypertension     Menopause     Hot flashes began before chemo in July     Review of patient's allergies indicates:   Allergen Reactions    Fosaprepitant Other (See Comments)     Flushing, nausea, abdominal discomfort    Adhesive     Erythromycin base Other (See Comments) and Rash     Fever     Current Outpatient Medications on File Prior to Visit   Medication Sig Dispense Refill    acyclovir (ZOVIRAX) 400 MG tablet Take 400 mg by mouth once daily.      carvediloL (COREG) 3.125 MG tablet SMARTSI Tablet(s) By Mouth Morning-Evening      cyanocobalamin 2000 MCG tablet Take 2,000 mcg by mouth once daily.      DULoxetine (CYMBALTA) 60 MG capsule Take 120 mg by mouth once daily.      hydrOXYzine pamoate (VISTARIL) 50 MG Cap Take by mouth.      hyoscyamine (LEVSIN/SL) 0.125 mg Subl DISSOLVE 1 TABLET UNDER THE TONGUE EVERY 6 HOURS AS NEEDED FOR CRAMPING OR DIARRHEA      ipratropium (ATROVENT) 21  mcg (0.03 %) nasal spray SMARTSI Spray(s) Both Nares      LIDOCAINE VISCOUS 2 % solution SMARTSIG:10 Milliliter(s) By Mouth Every 3 Hours PRN      LIDOcaine-prilocaine (EMLA) cream Apply topically as needed.      lisinopriL 10 MG tablet Take 10 mg by mouth once daily.      multivitamin (THERAGRAN) per tablet Take 1 tablet by mouth once daily.      OLANZapine (ZYPREXA) 2.5 MG tablet Take 2.5 mg by mouth every evening.      ondansetron (ZOFRAN-ODT) 8 MG TbDL 8 mg.      pyridoxine, vitamin B6, (B-6) 100 MG Tab Take 50 mg by mouth once daily.      vitamin E 400 UNIT capsule Take 400 Units by mouth once daily.      [DISCONTINUED] duloxetine HCl (DULOXETINE ORAL) Take 120 mg by mouth once daily.      fluticasone propionate (FLONASE) 50 mcg/actuation nasal spray 1 spray by Each Nostril route once daily.      loratadine (CLARITIN) 10 mg tablet Take 10 mg by mouth once daily.      spironolactone (ALDACTONE) 100 MG tablet Take 100 mg by mouth every morning.       Current Facility-Administered Medications on File Prior to Visit   Medication Dose Route Frequency Provider Last Rate Last Admin    scopolamine 1.3-1.5 mg (1 mg over 3 days) 1 patch  1 patch Transdermal Q3 Days Sridhar Mao MD   1 patch at 22 0624       Review of Systems    Objective:   /68 (BP Location: Right arm, Patient Position: Sitting, BP Method: Large (Manual))   Pulse 76   Resp 18   Ht 5' (1.524 m)   Wt 73.5 kg (162 lb)   LMP 2022   SpO2 99%   BMI 31.64 kg/m²     Physical Exam  Constitutional:       General: She is not in acute distress.     Appearance: Normal appearance.   HENT:      Head: Normocephalic and atraumatic.   Eyes:      General: No scleral icterus.     Conjunctiva/sclera: Conjunctivae normal.   Neck:      Vascular: No carotid bruit.   Cardiovascular:      Rate and Rhythm: Normal rate and regular rhythm.      Pulses: Normal pulses.      Heart sounds: Normal heart sounds. No murmur heard.    No friction rub. No  gallop.   Pulmonary:      Effort: Pulmonary effort is normal.      Breath sounds: Normal breath sounds.   Abdominal:      General: Bowel sounds are normal.      Palpations: Abdomen is soft. There is no mass.      Tenderness: There is no abdominal tenderness. There is no guarding or rebound.   Musculoskeletal:         General: No deformity.      Cervical back: No rigidity or tenderness.      Right lower leg: No edema.      Left lower leg: No edema.   Lymphadenopathy:      Cervical: No cervical adenopathy.   Skin:     Coloration: Skin is not jaundiced or pale.      Findings: No erythema.   Neurological:      General: No focal deficit present.      Mental Status: She is alert and oriented to person, place, and time.      Gait: Gait normal.   Psychiatric:         Mood and Affect: Mood normal.         Behavior: Behavior normal.         Thought Content: Thought content normal.         Judgment: Judgment normal.     Assessment and Plan:     Wellness examination  Health Maintenance Due   Topic Date Due    Pneumococcal Vaccines (Age 0-64) (1 - PCV) Never done    COVID-19 Vaccine (3 - Booster for Pfizer series) 06/29/2021    Influenza Vaccine (1) 09/01/2022     Recent labs reviewed and discussed.  Declined pneumonia vaccine.    HSV-2 infection  On chronic suppression with acyclovir.    Depression  Stable, cont cymbalta.    Cardiotoxicity  She feels back to normal.    BRCA1-associated protein-1 tumor predisposition syndrome  She has an appt to discuss prophylactic dalton bso with Dr. Winslow.      Follow up in about 6 months (around 8/27/2023).       [unfilled]  No orders of the defined types were placed in this encounter.

## 2023-03-06 ENCOUNTER — PATIENT MESSAGE (OUTPATIENT)
Dept: SURGERY | Facility: CLINIC | Age: 43
End: 2023-03-06
Payer: COMMERCIAL

## 2023-05-11 ENCOUNTER — OFFICE VISIT (OUTPATIENT)
Dept: SURGERY | Facility: CLINIC | Age: 43
End: 2023-05-11
Payer: COMMERCIAL

## 2023-05-11 VITALS
SYSTOLIC BLOOD PRESSURE: 120 MMHG | BODY MASS INDEX: 33.38 KG/M2 | HEIGHT: 60 IN | TEMPERATURE: 98 F | RESPIRATION RATE: 18 BRPM | OXYGEN SATURATION: 98 % | WEIGHT: 170 LBS | HEART RATE: 72 BPM | DIASTOLIC BLOOD PRESSURE: 80 MMHG

## 2023-05-11 DIAGNOSIS — C50.111 MALIGNANT NEOPLASM OF CENTRAL PORTION OF RIGHT BREAST IN FEMALE, ESTROGEN RECEPTOR NEGATIVE: Primary | ICD-10-CM

## 2023-05-11 DIAGNOSIS — Z15.01 BREAST CANCER, BRCA1 POSITIVE, RIGHT: ICD-10-CM

## 2023-05-11 DIAGNOSIS — Z90.13 S/P BILATERAL MASTECTOMY: ICD-10-CM

## 2023-05-11 DIAGNOSIS — Z17.1 MALIGNANT NEOPLASM OF CENTRAL PORTION OF RIGHT BREAST IN FEMALE, ESTROGEN RECEPTOR NEGATIVE: Primary | ICD-10-CM

## 2023-05-11 DIAGNOSIS — C50.911 BREAST CANCER, BRCA1 POSITIVE, RIGHT: ICD-10-CM

## 2023-05-11 PROCEDURE — 3079F PR MOST RECENT DIASTOLIC BLOOD PRESSURE 80-89 MM HG: ICD-10-PCS | Mod: CPTII,S$GLB,, | Performed by: PHYSICIAN ASSISTANT

## 2023-05-11 PROCEDURE — 3008F PR BODY MASS INDEX (BMI) DOCUMENTED: ICD-10-PCS | Mod: CPTII,S$GLB,, | Performed by: PHYSICIAN ASSISTANT

## 2023-05-11 PROCEDURE — 3074F PR MOST RECENT SYSTOLIC BLOOD PRESSURE < 130 MM HG: ICD-10-PCS | Mod: CPTII,S$GLB,, | Performed by: PHYSICIAN ASSISTANT

## 2023-05-11 PROCEDURE — 99214 OFFICE O/P EST MOD 30 MIN: CPT | Mod: S$GLB,,, | Performed by: PHYSICIAN ASSISTANT

## 2023-05-11 PROCEDURE — 3079F DIAST BP 80-89 MM HG: CPT | Mod: CPTII,S$GLB,, | Performed by: PHYSICIAN ASSISTANT

## 2023-05-11 PROCEDURE — 1160F RVW MEDS BY RX/DR IN RCRD: CPT | Mod: CPTII,S$GLB,, | Performed by: PHYSICIAN ASSISTANT

## 2023-05-11 PROCEDURE — 1160F PR REVIEW ALL MEDS BY PRESCRIBER/CLIN PHARMACIST DOCUMENTED: ICD-10-PCS | Mod: CPTII,S$GLB,, | Performed by: PHYSICIAN ASSISTANT

## 2023-05-11 PROCEDURE — 99999 PR PBB SHADOW E&M-EST. PATIENT-LVL V: ICD-10-PCS | Mod: PBBFAC,,, | Performed by: PHYSICIAN ASSISTANT

## 2023-05-11 PROCEDURE — 1159F MED LIST DOCD IN RCRD: CPT | Mod: CPTII,S$GLB,, | Performed by: PHYSICIAN ASSISTANT

## 2023-05-11 PROCEDURE — 3074F SYST BP LT 130 MM HG: CPT | Mod: CPTII,S$GLB,, | Performed by: PHYSICIAN ASSISTANT

## 2023-05-11 PROCEDURE — 1159F PR MEDICATION LIST DOCUMENTED IN MEDICAL RECORD: ICD-10-PCS | Mod: CPTII,S$GLB,, | Performed by: PHYSICIAN ASSISTANT

## 2023-05-11 PROCEDURE — 4010F PR ACE/ARB THEARPY RXD/TAKEN: ICD-10-PCS | Mod: CPTII,S$GLB,, | Performed by: PHYSICIAN ASSISTANT

## 2023-05-11 PROCEDURE — 99999 PR PBB SHADOW E&M-EST. PATIENT-LVL V: CPT | Mod: PBBFAC,,, | Performed by: PHYSICIAN ASSISTANT

## 2023-05-11 PROCEDURE — 3008F BODY MASS INDEX DOCD: CPT | Mod: CPTII,S$GLB,, | Performed by: PHYSICIAN ASSISTANT

## 2023-05-11 PROCEDURE — 99214 PR OFFICE/OUTPT VISIT, EST, LEVL IV, 30-39 MIN: ICD-10-PCS | Mod: S$GLB,,, | Performed by: PHYSICIAN ASSISTANT

## 2023-05-11 PROCEDURE — 4010F ACE/ARB THERAPY RXD/TAKEN: CPT | Mod: CPTII,S$GLB,, | Performed by: PHYSICIAN ASSISTANT

## 2023-05-11 NOTE — PROGRESS NOTES
[facility] Breast Surg  Breast Surgical Oncology  Follow-Up Patient Office Visit       Referring Provider: No ref. provider found  PCP: Richa Waller MD   Care Team: No care team member to display No care team member to display     Chief Complaint:   Chief Complaint   Patient presents with    Follow-up        Subjective:   Treatment History:  1. 2022-Genetic Testing- BRCA 1 mutation  2. Medical Oncology Referral to Dr. Marcy Del Valle  3. 2022 Mediport Placement  4. Neoadjuvant Chemotherapy - AC/Ketruda followed by weekly Taxol x 12 cycles (last dose of Taxol 2022) + Carboplatin/Keytruda (last dose of Keytruda 2022)  5. 2023 Bilateral simple mastectomy and right SLNB - consistent with complete response  6. Adjuvant Ketruda    Interval History:  2023 - Rob Dennison presents today for follow up. She is doing well. She states that she occasionally has soreness to the mastectomy sites, primarily the right side. The tenderness isn't too bothersome. Otherwise, she has no concerns to the bilateral mastectomies and is doing well. She continues Keytruda and has 2 cycles left. She also is establishing care with dermatology for melanoma screening and plans to have a hysterectomy and oopherectomy at the end of the year with her GYN. She also plans to establish care with GI for screening colonoscopies too.    HPI:  Rob Dennison is a 42 y.o. female who presents on 2022 for evaluation of newly diagnosed right breast cancer.     A detailed patient history was obtained and reviewed. She currently denies any breast issues including rashes, redness, pain, swelling, nipple discharge, or new lumps/masses. She lives in [place] and previously lived in [place]. Offered referral to breast surgeon in [place] for surveillance for convenience. She prefers to continue follow up care here.      Imagin. 3/9/2022 BL SC MG at [facility] - which revealed a a focal asymmetry in  the right breast central to the nipple, posterior depth.    No other significant masses, calcifications, or other findings are seen in either breast.  BIRADS-0; additional imaging needed.     2. 4/14/2022 R DG MG/ R US BREAST LIMITED at [facility] - which revealed on R MG at central region posterior depth focal asymmetry recalled from screening mammography. On today's additional mammographic views, there are three adjacent oval equal density masses with indistinct margins located in the central region far posterior depth. The most posterior of these three masses correlates with the focal asymmetry recalled from screening. No other significant mammographic finding.  On R US, at 7 o'clock to 9 o'clock and subareolar region revealed three adjacent oval hypoechoic masses with indistinct margins at the 9 o'clock  subareolar position, correlating with the three adjacent oval equal density masses with indistinct margins seen on today's mammogram. These three masses measure 0.5 x 0.5 x 0.4 cm, 0.9 x 0.9 x 0.8 cm, and 0.8 x 0.4 x 0.5 cm.   Incidentally seen at 7 o'clock to 8 o'clock  are several subcentimeter cysts of varying size and complication. These are benign. No suspicious right axillary adenopathy. BIRADS-4 suspicious; biopsy recommended.    3. 6/8/2022 Xray Right Shoulder at [facility] - which revealed no acute osseous abnormality.     4. 6/9/2022 BL BREAST MRI at [facility] - which revealed in R breast, 9 o'clock subareolar right breast, posterior depth, there is a 2.7 x 1.5 x 1.6 cm oval mass with non circumscribed margins which demonstrates rapid, washout kinetics (axial image 333).  There is a signal void consistent with a T3 coil shaped HydroMARK clip within this known malignancy.  In the subareolar right breast, anterior to middle depth, there is a 0.7 x 0.3 x 0.7 cm oval, enhancing mass with circumscribed margins (axial image 335).  This mass is 3.5 cm anterior to the known malignancy and 2.8 cm deep to the nipple.    Inferomedial to the known malignancy, in the lower inner quadrant of the right breast, posterior depth, there are 2 areas of clumped, non mass enhancement.  The deeper of the 2 areas of clumped, non mass enhancement spans 0.8 cm (axial image 341) and is 1.4 cm anterior to the pectoralis major muscle.  The more superficial area of clumped, non mass enhancement spans 0.7 cm (axial image 342).   Superomedial to the known malignancy, in the upper inner quadrant of the right breast, middle depth, there is linear, non mass enhancement spanning 1.7 cm anterior to posterior (axial image 327).  In total the abnormal enhancement spans 7.6 cm anterior to posterior.  There is no skin thickening or nipple retraction.  No axillary or internal mammary lymphadenopathy is identified.  In the L breast, there is no suspicious areas of enhancement or areas of architectural distortion are seen.  There is no skin thickening or nipple retraction.  No axillary or internal mammary lymphadenopathy is identified.  BIRADS-4 suspicious     Pathology:  1. 5/1/2022 Ultrasound-guided Core Needle Biopsy Right Breast Mass 9 o'clock Subareolar-  -Invasive ductal carcinoma, grade 3 (measuring 5.0mm)  ER 1%  AL 0%  HER2 Negative  Ki67 63%    2. 1/30/2023 Bilateral simple mastectomy and Right sentinel lymph node biopsy - Right mastectomy revealed stromal sclerosis with numerous hemosiderin-laden macrophages, negative for residual carcinoma (complete pathological response - see comment). 6 sentinel lymph nodes negative for metastatic carcinoma.    Comment: This patient's history of a previous core biopsy demonstrating a high grade   invasive ductal carcinoma (ER 1%, AL -, HER2- and Ki-67 63%) is noted. Review of   the electronic medical record reveals that this 27 mm mass was at the 9 o'clock   subareolar position, posterior depth. This region has been extensively sampled,   including 25 additional `gross recuts`.  There is no residual carcinoma. A zone    of sclerosis with numerous hemosiderin-laden macrophages represents the prior   biopsy site. The axillary lymph nodes likewise have abundant hemosiderin-laden   macrophages in the subcapsular sinusoids. The findings are indicative of a   complete pathologic response to neoadjuvant therapy.      OB/GYN History:  Age at Menarche Onset: 10  Menopausal Status: premenopausal, LMP: 2022   Hysterectomy/Oophorectomy: no,n/a  Hormonal birth control (duration): Yes OCP (estrogen/progesterone).   Pregnancy History:   Age at first live birth: 1  Hormone Replacement Therapy: No, none  Patient admits to nipple discharge. Described as bilateral and clear after having first mammogram which has resolved   Patient denies to previous breast biopsy.   Patient denies to a personal history of breast cancer.  MG breast density: Category C (Heterogeneously dense)     Other Relevant History:  Prior thoracic RT: none  Genetic testing: yes  Ashkenazi Mandaeism descent: No     Family History:  Father - Skin cancer possible melanoma at age 80  Paternal Grandfather - colon cancer at age 70     Past History:  Past Medical History:   Diagnosis Date    Anemia     Oct. Nov of 2022    Anxiety     Breast cancer in female     right    Depression     Elevated troponin 2022    Hypertension     Menopause     Hot flashes began before chemo in July        Past Surgical History:   Procedure Laterality Date    ABDOMINAL SURGERY      Gall bladder removal     AXILLARY NODE DISSECTION Right 2023    Procedure: LYMPHADENECTOMY, AXILLARY;  Surgeon: Genie Josue MD;  Location: [facility];  Service: General;  Laterality: Right;    BACK SURGERY      CHOLECYSTECTOMY      ENDOSCOPIC RELEASE OF BOTH CARPAL TUNNELS Bilateral     SENTINEL LYMPH NODE BIOPSY Right 2023    Procedure: BIOPSY, LYMPH NODE, SENTINEL Right MagTrace Injected in office SentiMag needed No NUC MED;  Surgeon: Genie Josue MD;  Location: [facility] OR;  Service: General;   Laterality: Right;  MagTrace Injected in office  SentiMag needed  No NUC MED    SIMPLE MASTECTOMY Bilateral 2023    Procedure: MASTECTOMY, SIMPLE  Bilateral;  Surgeon: Genie Josue MD;  Location: [facility] OR;  Service: General;  Laterality: Bilateral;    SPINE SURGERY      Shaved herniated disc    TRIGGER FINGER RELEASE Right     WISDOM TOOTH EXTRACTION          Social History     Socioeconomic History    Marital status:    Tobacco Use    Smoking status: Never    Smokeless tobacco: Never   Substance and Sexual Activity    Alcohol use: Yes     Comment: Occasionally    Drug use: Never    Sexual activity: Yes     Partners: Female     Birth control/protection: None        Body mass index is 33.2 kg/m².     Allergy/Medications:   Review of patient's allergies indicates:   Allergen Reactions    Fosaprepitant Other (See Comments)     Flushing, nausea, abdominal discomfort    Adhesive     Erythromycin base Other (See Comments) and Rash     Fever          Current Outpatient Medications:     acyclovir (ZOVIRAX) 400 MG tablet, Take 400 mg by mouth once daily., Disp: , Rfl:     carvediloL (COREG) 3.125 MG tablet, SMARTSI Tablet(s) By Mouth Morning-Evening, Disp: , Rfl:     cyanocobalamin 2000 MCG tablet, Take 2,000 mcg by mouth once daily., Disp: , Rfl:     DULoxetine (CYMBALTA) 60 MG capsule, TAKE 2 CAPSULES BY MOUTH EVERY DAY, Disp: 360 capsule, Rfl: 1    fluticasone propionate (FLONASE) 50 mcg/actuation nasal spray, 1 spray by Each Nostril route once daily., Disp: , Rfl:     hydrOXYzine pamoate (VISTARIL) 50 MG Cap, Take by mouth., Disp: , Rfl:     hyoscyamine (LEVSIN/SL) 0.125 mg Subl, DISSOLVE 1 TABLET UNDER THE TONGUE EVERY 6 HOURS AS NEEDED FOR CRAMPING OR DIARRHEA, Disp: , Rfl:     ipratropium (ATROVENT) 21 mcg (0.03 %) nasal spray, SMARTSI Spray(s) Both Nares, Disp: , Rfl:     LIDOCAINE VISCOUS 2 % solution, SMARTSIG:10 Milliliter(s) By Mouth Every 3 Hours PRN, Disp: , Rfl:     LIDOcaine-prilocaine  (EMLA) cream, Apply topically as needed., Disp: , Rfl:     lisinopriL 10 MG tablet, TAKE 1 TABLET BY MOUTH DAILY, Disp: 90 tablet, Rfl: 0    loratadine (CLARITIN) 10 mg tablet, Take 10 mg by mouth once daily., Disp: , Rfl:     multivitamin (THERAGRAN) per tablet, Take 1 tablet by mouth once daily., Disp: , Rfl:     OLANZapine (ZYPREXA) 2.5 MG tablet, Take 2.5 mg by mouth every evening., Disp: , Rfl:     ondansetron (ZOFRAN-ODT) 8 MG TbDL, 8 mg., Disp: , Rfl:     pyridoxine, vitamin B6, (B-6) 100 MG Tab, Take 50 mg by mouth once daily., Disp: , Rfl:     spironolactone (ALDACTONE) 100 MG tablet, Take 100 mg by mouth every morning., Disp: , Rfl:     vitamin E 400 UNIT capsule, Take 400 Units by mouth once daily., Disp: , Rfl:   No current facility-administered medications for this visit.    Facility-Administered Medications Ordered in Other Visits:     scopolamine 1.3-1.5 mg (1 mg over 3 days) 1 patch, 1 patch, Transdermal, Q3 Days, Sridhar Mao MD, 1 patch at 07/01/22 0624       Review of Systems:  Constitutional: denies fevers, chills, weight loss  HEENT: denies blurry/double vision, changes in hearing, odynophagia, dysphagia  Respiratory: denies cough, shortness of breath  Cardiovascular: denies palpitations, swelling of the extremities  GI: denies abdominal pain, nausea/vomiting, hematochezia, frequent stools  : denies frequency, dysuria, flank pain, hematuria  Skin: denies new rashes  Neurological: denies muscular/sensory deficiencies, loss of coordination, headaches, memory changes  Endo: denies hair loss/thinning, nervousness, hot flashes, heat/cold intolerance, lumps in the neck area  Heme: denies easy bruising and fatigue  Psychological: denies anxious/depressive moods  Musculoskeletal: denies bony pain, muscle cramps, swollen joints    Objective:     Vitals:  Blood pressure 120/80, pulse 72, temperature 98 °F (36.7 °C), temperature source Oral, resp. rate 18, height 5' (1.524 m), weight 77.1 kg (170  lb), SpO2 98 %.    Physical Exam:  General: The patient is awake, alert and oriented times three. The patient is well nourished and in no acute distress.    Musculoskeletal: The patient has a normal range of motion of her bilateral upper extremities.    Breast: The mastectomy incisions are healing well. No tenderness, swelling, or redness. Resolving ecchymosis present. Bilateral drains in place with serosanguineous fluid in the CHOLO bulb.  Integumentary: no rashes or skin lesions present  Neurologic: cranial nerves intact, no signs of peripheral neurological deficit, motor/sensory function intact      Assessment and Plan:     Encounter Diagnoses   Name Primary?    Malignant neoplasm of central portion of right breast in female, estrogen receptor negative Yes    Breast cancer, BRCA1 positive, right     S/P bilateral mastectomy               Adarelis was seen today for follow-up.    Diagnoses and all orders for this visit:    Malignant neoplasm of central portion of right breast in female, estrogen receptor negative    Breast cancer, BRCA1 positive, right    S/P bilateral mastectomy         Plan:         RTC in 6 months for CBE.    Follow up with medical oncology for adjuvant treatments.    Patient is starting screening for melanoma and establishing with GI for screenings as well.    Expects preventative hysterotomy and oophorectomy at the end of this year.    Healthy lifestyle guidelines were reviewed. She was encouraged to engage in regular exercise, maintain a healthy body weight, and avoid excessive alcohol consumption. Healthy nutritional guidelines were also discussed. Self-breast examination was reviewed with the patient in detail and she was encouraged to perform this on a monthly basis.         All of her questions were answered. She was advised to call if she develops any questions or concerns.    Shavon Rizzo PA-C      Total time on the date of the visit ranged from 30-39 mins (15829). Total time  includes both face-to-face and non-face-to-face time personally spent by myself on the day of the visit.    Non-face-to-face time included:  _X_ preparing to see the patient such as reviewing the patient record  __ obtaining and reviewing separately obtained history  _X_ independently interpreting results  _X_ documenting clinical information in electronic health record.    Face-to-face time included:  _X_ performing an appropriate history and examination  _X_ communicating results to the patient  _X_ counseling and educating the patient  __ ordering appropriate medications  __ ordering appropriate tests  _X_ ordering appropriate procedures (including follow-up)  _X_ answering any questions the patient had    Total Time spent on date of visit: 35 minutes

## 2023-05-29 PROBLEM — Z00.00 WELLNESS EXAMINATION: Status: RESOLVED | Noted: 2023-02-27 | Resolved: 2023-05-29

## 2023-08-23 ENCOUNTER — TELEPHONE (OUTPATIENT)
Dept: INTERNAL MEDICINE | Facility: CLINIC | Age: 43
End: 2023-08-23
Payer: COMMERCIAL

## 2023-08-23 NOTE — TELEPHONE ENCOUNTER
----- Message from Shahnaz Almeida LPN sent at 8/22/2023  8:31 AM CDT -----  Regarding: ORALIA Waller 8/30/23 @10:00a  Are there any outstanding tasks in the chart? no    Is there any documentation of tasks? no    Has patient seen another physician, been to the ER, C, or admitted to hospital since last visit?    Has the patient done blood work or imaging since last visit?

## 2023-09-21 DIAGNOSIS — I10 ESSENTIAL HYPERTENSION: ICD-10-CM

## 2023-09-21 RX ORDER — LISINOPRIL 10 MG/1
TABLET ORAL
Qty: 90 TABLET | Refills: 0 | Status: SHIPPED | OUTPATIENT
Start: 2023-09-21 | End: 2024-02-05

## 2023-11-13 ENCOUNTER — DOCUMENTATION ONLY (OUTPATIENT)
Dept: INTERNAL MEDICINE | Facility: CLINIC | Age: 43
End: 2023-11-13
Payer: COMMERCIAL

## 2023-11-16 ENCOUNTER — OFFICE VISIT (OUTPATIENT)
Dept: SURGERY | Facility: CLINIC | Age: 43
End: 2023-11-16
Payer: COMMERCIAL

## 2023-11-16 ENCOUNTER — OFFICE VISIT (OUTPATIENT)
Dept: INTERNAL MEDICINE | Facility: CLINIC | Age: 43
End: 2023-11-16
Payer: COMMERCIAL

## 2023-11-16 VITALS
HEIGHT: 60 IN | OXYGEN SATURATION: 98 % | WEIGHT: 178 LBS | TEMPERATURE: 98 F | DIASTOLIC BLOOD PRESSURE: 72 MMHG | SYSTOLIC BLOOD PRESSURE: 124 MMHG | HEART RATE: 92 BPM | BODY MASS INDEX: 34.95 KG/M2

## 2023-11-16 VITALS
HEIGHT: 60 IN | BODY MASS INDEX: 34.36 KG/M2 | HEART RATE: 88 BPM | OXYGEN SATURATION: 98 % | WEIGHT: 175 LBS | DIASTOLIC BLOOD PRESSURE: 77 MMHG | SYSTOLIC BLOOD PRESSURE: 113 MMHG | RESPIRATION RATE: 18 BRPM | TEMPERATURE: 98 F

## 2023-11-16 DIAGNOSIS — G47.09 OTHER INSOMNIA: ICD-10-CM

## 2023-11-16 DIAGNOSIS — Z17.1 MALIGNANT NEOPLASM OF UPPER-OUTER QUADRANT OF RIGHT BREAST IN FEMALE, ESTROGEN RECEPTOR NEGATIVE: ICD-10-CM

## 2023-11-16 DIAGNOSIS — M19.041 PRIMARY OSTEOARTHRITIS OF BOTH HANDS: ICD-10-CM

## 2023-11-16 DIAGNOSIS — Z15.01 BREAST CANCER, BRCA1 POSITIVE, RIGHT: ICD-10-CM

## 2023-11-16 DIAGNOSIS — M19.042 PRIMARY OSTEOARTHRITIS OF BOTH HANDS: ICD-10-CM

## 2023-11-16 DIAGNOSIS — G47.10 HYPERSOMNIA: ICD-10-CM

## 2023-11-16 DIAGNOSIS — C50.911 BREAST CANCER, BRCA1 POSITIVE, RIGHT: ICD-10-CM

## 2023-11-16 DIAGNOSIS — R07.9 RIGHT-SIDED CHEST PAIN: Primary | ICD-10-CM

## 2023-11-16 DIAGNOSIS — Z17.1 MALIGNANT NEOPLASM OF CENTRAL PORTION OF RIGHT BREAST IN FEMALE, ESTROGEN RECEPTOR NEGATIVE: Primary | ICD-10-CM

## 2023-11-16 DIAGNOSIS — C50.111 MALIGNANT NEOPLASM OF CENTRAL PORTION OF RIGHT BREAST IN FEMALE, ESTROGEN RECEPTOR NEGATIVE: Primary | ICD-10-CM

## 2023-11-16 DIAGNOSIS — R53.83 OTHER FATIGUE: ICD-10-CM

## 2023-11-16 DIAGNOSIS — Z90.13 S/P BILATERAL MASTECTOMY: ICD-10-CM

## 2023-11-16 DIAGNOSIS — Z23 NEED FOR VACCINATION: ICD-10-CM

## 2023-11-16 DIAGNOSIS — C50.411 MALIGNANT NEOPLASM OF UPPER-OUTER QUADRANT OF RIGHT BREAST IN FEMALE, ESTROGEN RECEPTOR NEGATIVE: ICD-10-CM

## 2023-11-16 PROBLEM — Z95.828 PORT-A-CATH IN PLACE: Status: ACTIVE | Noted: 2023-10-14

## 2023-11-16 PROBLEM — M25.542 ARTHRALGIA OF BOTH HANDS: Status: ACTIVE | Noted: 2023-10-14

## 2023-11-16 PROBLEM — R23.2 HOT FLASHES: Status: ACTIVE | Noted: 2023-05-05

## 2023-11-16 PROBLEM — Z79.899 ENCOUNTER FOR MONITORING CARDIOTOXIC DRUG THERAPY: Status: ACTIVE | Noted: 2022-08-05

## 2023-11-16 PROBLEM — Z51.81 ENCOUNTER FOR MONITORING CARDIOTOXIC DRUG THERAPY: Status: ACTIVE | Noted: 2022-08-05

## 2023-11-16 PROBLEM — M25.541 ARTHRALGIA OF BOTH HANDS: Status: ACTIVE | Noted: 2023-10-14

## 2023-11-16 PROCEDURE — 4010F ACE/ARB THERAPY RXD/TAKEN: CPT | Mod: CPTII,S$GLB,, | Performed by: PHYSICIAN ASSISTANT

## 2023-11-16 PROCEDURE — 3008F PR BODY MASS INDEX (BMI) DOCUMENTED: ICD-10-PCS | Mod: CPTII,,, | Performed by: NURSE PRACTITIONER

## 2023-11-16 PROCEDURE — 4010F PR ACE/ARB THEARPY RXD/TAKEN: ICD-10-PCS | Mod: CPTII,S$GLB,, | Performed by: PHYSICIAN ASSISTANT

## 2023-11-16 PROCEDURE — 3078F PR MOST RECENT DIASTOLIC BLOOD PRESSURE < 80 MM HG: ICD-10-PCS | Mod: CPTII,S$GLB,, | Performed by: PHYSICIAN ASSISTANT

## 2023-11-16 PROCEDURE — 3008F BODY MASS INDEX DOCD: CPT | Mod: CPTII,S$GLB,, | Performed by: PHYSICIAN ASSISTANT

## 2023-11-16 PROCEDURE — 90472 IMMUNIZATION ADMIN EACH ADD: CPT | Mod: ,,, | Performed by: NURSE PRACTITIONER

## 2023-11-16 PROCEDURE — 3044F PR MOST RECENT HEMOGLOBIN A1C LEVEL <7.0%: ICD-10-PCS | Mod: CPTII,S$GLB,, | Performed by: PHYSICIAN ASSISTANT

## 2023-11-16 PROCEDURE — 1159F MED LIST DOCD IN RCRD: CPT | Mod: CPTII,S$GLB,, | Performed by: PHYSICIAN ASSISTANT

## 2023-11-16 PROCEDURE — 3044F HG A1C LEVEL LT 7.0%: CPT | Mod: CPTII,S$GLB,, | Performed by: PHYSICIAN ASSISTANT

## 2023-11-16 PROCEDURE — 3078F PR MOST RECENT DIASTOLIC BLOOD PRESSURE < 80 MM HG: ICD-10-PCS | Mod: CPTII,,, | Performed by: NURSE PRACTITIONER

## 2023-11-16 PROCEDURE — 3074F SYST BP LT 130 MM HG: CPT | Mod: CPTII,S$GLB,, | Performed by: PHYSICIAN ASSISTANT

## 2023-11-16 PROCEDURE — 99214 OFFICE O/P EST MOD 30 MIN: CPT | Mod: 25,,, | Performed by: NURSE PRACTITIONER

## 2023-11-16 PROCEDURE — 99214 PR OFFICE/OUTPT VISIT, EST, LEVL IV, 30-39 MIN: ICD-10-PCS | Mod: S$GLB,,, | Performed by: PHYSICIAN ASSISTANT

## 2023-11-16 PROCEDURE — 3008F PR BODY MASS INDEX (BMI) DOCUMENTED: ICD-10-PCS | Mod: CPTII,S$GLB,, | Performed by: PHYSICIAN ASSISTANT

## 2023-11-16 PROCEDURE — 99999 PR PBB SHADOW E&M-EST. PATIENT-LVL III: CPT | Mod: PBBFAC,,, | Performed by: PHYSICIAN ASSISTANT

## 2023-11-16 PROCEDURE — 90471 FLU VACCINE (QUAD) GREATER THAN OR EQUAL TO 3YO PRESERVATIVE FREE IM: ICD-10-PCS | Mod: ,,, | Performed by: NURSE PRACTITIONER

## 2023-11-16 PROCEDURE — 3044F PR MOST RECENT HEMOGLOBIN A1C LEVEL <7.0%: ICD-10-PCS | Mod: CPTII,,, | Performed by: NURSE PRACTITIONER

## 2023-11-16 PROCEDURE — 3078F DIAST BP <80 MM HG: CPT | Mod: CPTII,,, | Performed by: NURSE PRACTITIONER

## 2023-11-16 PROCEDURE — 90472 PNEUMOCOCCAL CONJUGATE VACCINE 20-VALENT: ICD-10-PCS | Mod: ,,, | Performed by: NURSE PRACTITIONER

## 2023-11-16 PROCEDURE — 90686 IIV4 VACC NO PRSV 0.5 ML IM: CPT | Mod: ,,, | Performed by: NURSE PRACTITIONER

## 2023-11-16 PROCEDURE — 90677 PCV20 VACCINE IM: CPT | Mod: ,,, | Performed by: NURSE PRACTITIONER

## 2023-11-16 PROCEDURE — 99999 PR PBB SHADOW E&M-EST. PATIENT-LVL III: ICD-10-PCS | Mod: PBBFAC,,, | Performed by: PHYSICIAN ASSISTANT

## 2023-11-16 PROCEDURE — 99214 OFFICE O/P EST MOD 30 MIN: CPT | Mod: S$GLB,,, | Performed by: PHYSICIAN ASSISTANT

## 2023-11-16 PROCEDURE — 90471 IMMUNIZATION ADMIN: CPT | Mod: ,,, | Performed by: NURSE PRACTITIONER

## 2023-11-16 PROCEDURE — 3044F HG A1C LEVEL LT 7.0%: CPT | Mod: CPTII,,, | Performed by: NURSE PRACTITIONER

## 2023-11-16 PROCEDURE — 4010F ACE/ARB THERAPY RXD/TAKEN: CPT | Mod: CPTII,,, | Performed by: NURSE PRACTITIONER

## 2023-11-16 PROCEDURE — 3008F BODY MASS INDEX DOCD: CPT | Mod: CPTII,,, | Performed by: NURSE PRACTITIONER

## 2023-11-16 PROCEDURE — 3074F SYST BP LT 130 MM HG: CPT | Mod: CPTII,,, | Performed by: NURSE PRACTITIONER

## 2023-11-16 PROCEDURE — 4010F PR ACE/ARB THEARPY RXD/TAKEN: ICD-10-PCS | Mod: CPTII,,, | Performed by: NURSE PRACTITIONER

## 2023-11-16 PROCEDURE — 3074F PR MOST RECENT SYSTOLIC BLOOD PRESSURE < 130 MM HG: ICD-10-PCS | Mod: CPTII,S$GLB,, | Performed by: PHYSICIAN ASSISTANT

## 2023-11-16 PROCEDURE — 1160F RVW MEDS BY RX/DR IN RCRD: CPT | Mod: CPTII,S$GLB,, | Performed by: PHYSICIAN ASSISTANT

## 2023-11-16 PROCEDURE — 99214 PR OFFICE/OUTPT VISIT, EST, LEVL IV, 30-39 MIN: ICD-10-PCS | Mod: 25,,, | Performed by: NURSE PRACTITIONER

## 2023-11-16 PROCEDURE — 90686 FLU VACCINE (QUAD) GREATER THAN OR EQUAL TO 3YO PRESERVATIVE FREE IM: ICD-10-PCS | Mod: ,,, | Performed by: NURSE PRACTITIONER

## 2023-11-16 PROCEDURE — 1159F PR MEDICATION LIST DOCUMENTED IN MEDICAL RECORD: ICD-10-PCS | Mod: CPTII,S$GLB,, | Performed by: PHYSICIAN ASSISTANT

## 2023-11-16 PROCEDURE — 90677 PNEUMOCOCCAL CONJUGATE VACCINE 20-VALENT: ICD-10-PCS | Mod: ,,, | Performed by: NURSE PRACTITIONER

## 2023-11-16 PROCEDURE — 3074F PR MOST RECENT SYSTOLIC BLOOD PRESSURE < 130 MM HG: ICD-10-PCS | Mod: CPTII,,, | Performed by: NURSE PRACTITIONER

## 2023-11-16 PROCEDURE — 1160F PR REVIEW ALL MEDS BY PRESCRIBER/CLIN PHARMACIST DOCUMENTED: ICD-10-PCS | Mod: CPTII,S$GLB,, | Performed by: PHYSICIAN ASSISTANT

## 2023-11-16 PROCEDURE — 3078F DIAST BP <80 MM HG: CPT | Mod: CPTII,S$GLB,, | Performed by: PHYSICIAN ASSISTANT

## 2023-11-16 RX ORDER — MINOXIDIL 2 %
1 SOLUTION, NON-ORAL TOPICAL 2 TIMES DAILY
COMMUNITY

## 2023-11-16 RX ORDER — MELOXICAM 15 MG/1
1 TABLET ORAL EVERY MORNING
COMMUNITY
Start: 2023-10-31 | End: 2024-10-30

## 2023-11-16 RX ORDER — TRAZODONE HYDROCHLORIDE 50 MG/1
25 TABLET ORAL NIGHTLY PRN
Qty: 30 TABLET | Refills: 11 | Status: SHIPPED | OUTPATIENT
Start: 2023-11-16

## 2023-11-16 NOTE — PROGRESS NOTES
[facility] Breast Surg  Breast Surgical Oncology  Follow-Up Patient Office Visit       Referring Provider: No ref. provider found  PCP: Richa Waller MD   Care Team: No care team member to display No care team member to display     Chief Complaint:   Chief Complaint   Patient presents with    Follow-up     Patient c/o left mastectomy itching, intermittent, no pain, patient is scheduled to have a total hysterectomy on 11/28/23 with Dr Aliya Winslow        Subjective:   Treatment History:  1. 6/8/2022-Genetic Testing- BRCA 1 mutation  2. Medical Oncology Referral to Dr. Marcy Del Valle  3. 7/1/2022 Mediport Placement  4. Neoadjuvant Chemotherapy - AC/Ketruda followed by weekly Taxol x 12 cycles (last dose of Taxol 12/22/2022) + Carboplatin/Keytruda (last dose of Keytruda 12/30/2022)  5. 1/30/2023 Bilateral simple mastectomy and right SLNB - consistent with complete response  6. Adjuvant Ketruda    Interval History:  11/16/2023 - Rob Dennison presents today for follow up. She is doing well. She states that she still continues to have soreness to the mastectomy sites, primarily the right side. The tenderness isn't too bothersome. Otherwise, she has no concerns to the bilateral mastectomies and is doing well. She finished Keytruda. She also establishing care with dermatology for melanoma screening and plans to have a hysterectomy and oopherectomy this month. She also plans to establish care with GI for screening colonoscopies too.    HPI:  Rob Dennison is a 42 y.o. female who presents on 6/2/2022 for evaluation of newly diagnosed right breast cancer.     A detailed patient history was obtained and reviewed. She currently denies any breast issues including rashes, redness, pain, swelling, nipple discharge, or new lumps/masses. She lives in [place] and previously lived in [place]. Offered referral to breast surgeon in [place] for surveillance for convenience. She prefers to continue  follow up care here.      Imagin. 3/9/2022 BL SC MG at [facility] - which revealed a a focal asymmetry in the right breast central to the nipple, posterior depth.    No other significant masses, calcifications, or other findings are seen in either breast.  BIRADS-0; additional imaging needed.     2. 2022 R DG MG/ R US BREAST LIMITED at [facility] - which revealed on R MG at central region posterior depth focal asymmetry recalled from screening mammography. On today's additional mammographic views, there are three adjacent oval equal density masses with indistinct margins located in the central region far posterior depth. The most posterior of these three masses correlates with the focal asymmetry recalled from screening. No other significant mammographic finding.  On R US, at 7 o'clock to 9 o'clock and subareolar region revealed three adjacent oval hypoechoic masses with indistinct margins at the 9 o'clock  subareolar position, correlating with the three adjacent oval equal density masses with indistinct margins seen on today's mammogram. These three masses measure 0.5 x 0.5 x 0.4 cm, 0.9 x 0.9 x 0.8 cm, and 0.8 x 0.4 x 0.5 cm.   Incidentally seen at 7 o'clock to 8 o'clock  are several subcentimeter cysts of varying size and complication. These are benign. No suspicious right axillary adenopathy. BIRADS-4 suspicious; biopsy recommended.    3. 2022 Xray Right Shoulder at [facility] - which revealed no acute osseous abnormality.     4. 2022 BL BREAST MRI at [facility] - which revealed in R breast, 9 o'clock subareolar right breast, posterior depth, there is a 2.7 x 1.5 x 1.6 cm oval mass with non circumscribed margins which demonstrates rapid, washout kinetics (axial image 333).  There is a signal void consistent with a T3 coil shaped HydroMARK clip within this known malignancy.  In the subareolar right breast, anterior to middle depth, there is a 0.7 x 0.3 x 0.7 cm oval, enhancing mass with circumscribed margins (axial  image 335).  This mass is 3.5 cm anterior to the known malignancy and 2.8 cm deep to the nipple.   Inferomedial to the known malignancy, in the lower inner quadrant of the right breast, posterior depth, there are 2 areas of clumped, non mass enhancement.  The deeper of the 2 areas of clumped, non mass enhancement spans 0.8 cm (axial image 341) and is 1.4 cm anterior to the pectoralis major muscle.  The more superficial area of clumped, non mass enhancement spans 0.7 cm (axial image 342).   Superomedial to the known malignancy, in the upper inner quadrant of the right breast, middle depth, there is linear, non mass enhancement spanning 1.7 cm anterior to posterior (axial image 327).  In total the abnormal enhancement spans 7.6 cm anterior to posterior.  There is no skin thickening or nipple retraction.  No axillary or internal mammary lymphadenopathy is identified.  In the L breast, there is no suspicious areas of enhancement or areas of architectural distortion are seen.  There is no skin thickening or nipple retraction.  No axillary or internal mammary lymphadenopathy is identified.  BIRADS-4 suspicious     Pathology:  1. 5/1/2022 Ultrasound-guided Core Needle Biopsy Right Breast Mass 9 o'clock Subareolar-  -Invasive ductal carcinoma, grade 3 (measuring 5.0mm)  ER 1%  IL 0%  HER2 Negative  Ki67 63%    2. 1/30/2023 Bilateral simple mastectomy and Right sentinel lymph node biopsy - Right mastectomy revealed stromal sclerosis with numerous hemosiderin-laden macrophages, negative for residual carcinoma (complete pathological response - see comment). 6 sentinel lymph nodes negative for metastatic carcinoma.    Comment: This patient's history of a previous core biopsy demonstrating a high grade   invasive ductal carcinoma (ER 1%, IL -, HER2- and Ki-67 63%) is noted. Review of   the electronic medical record reveals that this 27 mm mass was at the 9 o'clock   subareolar position, posterior depth. This region has been  extensively sampled,   including 25 additional `gross recuts`.  There is no residual carcinoma. A zone   of sclerosis with numerous hemosiderin-laden macrophages represents the prior   biopsy site. The axillary lymph nodes likewise have abundant hemosiderin-laden   macrophages in the subcapsular sinusoids. The findings are indicative of a   complete pathologic response to neoadjuvant therapy.      OB/GYN History:  Age at Menarche Onset: 10  Menopausal Status: premenopausal, LMP: 2022   Hysterectomy/Oophorectomy: no,n/a  Hormonal birth control (duration): Yes OCP (estrogen/progesterone).   Pregnancy History:   Age at first live birth: 1  Hormone Replacement Therapy: No, none  Patient admits to nipple discharge. Described as bilateral and clear after having first mammogram which has resolved   Patient denies to previous breast biopsy.   Patient denies to a personal history of breast cancer.  MG breast density: Category C (Heterogeneously dense)     Other Relevant History:  Prior thoracic RT: none  Genetic testing: yes  Ashkenazi Catholic descent: No     Family History:  Father - Skin cancer possible melanoma at age 80  Paternal Grandfather - colon cancer at age 70     Past History:  Past Medical History:   Diagnosis Date    Anemia     Oct. Nov of 2022    Anxiety     Breast cancer in female     right    Depression     Elevated troponin 2022    Hypertension     Menopause     Hot flashes began before chemo in July        Past Surgical History:   Procedure Laterality Date    ABDOMINAL SURGERY      Gall bladder removal     AXILLARY NODE DISSECTION Right 2023    Procedure: LYMPHADENECTOMY, AXILLARY;  Surgeon: Genie Josue MD;  Location: [facility];  Service: General;  Laterality: Right;    BACK SURGERY      CHOLECYSTECTOMY      ENDOSCOPIC RELEASE OF BOTH CARPAL TUNNELS Bilateral     SENTINEL LYMPH NODE BIOPSY Right 2023    Procedure: BIOPSY, LYMPH NODE, SENTINEL Right MagTrace Injected in  office SentiMag needed No NUC MED;  Surgeon: Genie Josue MD;  Location: [facility] OR;  Service: General;  Laterality: Right;  MagTrace Injected in office  SentiMag needed  No NUC MED    SIMPLE MASTECTOMY Bilateral 2023    Procedure: MASTECTOMY, SIMPLE  Bilateral;  Surgeon: Genie Josue MD;  Location: [facility] OR;  Service: General;  Laterality: Bilateral;    SPINE SURGERY      Shaved herniated disc    TRIGGER FINGER RELEASE Right     WISDOM TOOTH EXTRACTION          Social History     Socioeconomic History    Marital status:    Tobacco Use    Smoking status: Never    Smokeless tobacco: Never   Substance and Sexual Activity    Alcohol use: Yes     Comment: Occasionally    Drug use: Never    Sexual activity: Yes     Partners: Female     Birth control/protection: None        Body mass index is 34.18 kg/m².     Allergy/Medications:   Review of patient's allergies indicates:   Allergen Reactions    Fosaprepitant Other (See Comments)     Flushing, nausea, abdominal discomfort    Adhesive     Erythromycin base Other (See Comments) and Rash     Fever          Current Outpatient Medications:     acyclovir (ZOVIRAX) 400 MG tablet, Take 400 mg by mouth once daily., Disp: , Rfl:     carvediloL (COREG) 3.125 MG tablet, SMARTSI Tablet(s) By Mouth Morning-Evening, Disp: , Rfl:     cyanocobalamin 2000 MCG tablet, Take 2,000 mcg by mouth once daily., Disp: , Rfl:     DULoxetine (CYMBALTA) 60 MG capsule, TAKE 2 CAPSULES BY MOUTH EVERY DAY, Disp: 360 capsule, Rfl: 1    fluticasone propionate (FLONASE) 50 mcg/actuation nasal spray, 1 spray by Each Nostril route once daily., Disp: , Rfl:     LIDOcaine-prilocaine (EMLA) cream, Apply topically as needed., Disp: , Rfl:     lisinopriL 10 MG tablet, TAKE 1 TABLET BY MOUTH DAILY, Disp: 90 tablet, Rfl: 0    loratadine (CLARITIN) 10 mg tablet, Take 10 mg by mouth once daily., Disp: , Rfl:     meloxicam (MOBIC) 15 MG tablet, Take 1 tablet by mouth every morning., Disp: , Rfl:      multivitamin (THERAGRAN) per tablet, Take 1 tablet by mouth once daily., Disp: , Rfl:     pyridoxine, vitamin B6, (B-6) 100 MG Tab, Take 50 mg by mouth once daily., Disp: , Rfl:     vitamin E 400 UNIT capsule, Take 400 Units by mouth once daily., Disp: , Rfl:     hydrOXYzine pamoate (VISTARIL) 50 MG Cap, Take by mouth., Disp: , Rfl:     ipratropium (ATROVENT) 21 mcg (0.03 %) nasal spray, SMARTSI Spray(s) Both Nares, Disp: , Rfl:     LIDOCAINE VISCOUS 2 % solution, SMARTSIG:10 Milliliter(s) By Mouth Every 3 Hours PRN, Disp: , Rfl:     minoxidiL (ROGAINE) 2 % external solution, Apply 1 car-units/100 mL topically 2 (two) times daily., Disp: , Rfl:     OLANZapine (ZYPREXA) 2.5 MG tablet, Take 2.5 mg by mouth every evening., Disp: , Rfl:     ondansetron (ZOFRAN-ODT) 8 MG TbDL, 8 mg., Disp: , Rfl:     traZODone (DESYREL) 50 MG tablet, Take 0.5 tablets (25 mg total) by mouth nightly as needed for Insomnia., Disp: 30 tablet, Rfl: 11  No current facility-administered medications for this visit.    Facility-Administered Medications Ordered in Other Visits:     scopolamine 1.3-1.5 mg (1 mg over 3 days) 1 patch, 1 patch, Transdermal, Q3 Days, Sridhar Mao MD, 1 patch at 22 0624       Review of Systems:  Constitutional: denies fevers, chills, weight loss  HEENT: denies blurry/double vision, changes in hearing, odynophagia, dysphagia  Respiratory: denies cough, shortness of breath  Cardiovascular: denies palpitations, swelling of the extremities  GI: denies abdominal pain, nausea/vomiting, hematochezia, frequent stools  : denies frequency, dysuria, flank pain, hematuria  Skin: denies new rashes  Neurological: denies muscular/sensory deficiencies, loss of coordination, headaches, memory changes  Endo: denies hair loss/thinning, nervousness, hot flashes, heat/cold intolerance, lumps in the neck area  Heme: denies easy bruising and fatigue  Psychological: denies anxious/depressive moods  Musculoskeletal:  denies bony pain, muscle cramps, swollen joints    Objective:     Vitals:  Blood pressure 113/77, pulse 88, temperature 98.4 °F (36.9 °C), temperature source Oral, resp. rate 18, height 5' (1.524 m), weight 79.4 kg (175 lb), SpO2 98 %.    Physical Exam:  General: The patient is awake, alert and oriented times three. The patient is well nourished and in no acute distress.    Musculoskeletal: The patient has a normal range of motion of her bilateral upper extremities.    Breast:   Examination of the Mastectomy sites bilaterally fails to reveal any dominant masses or areas of significant focal nodularity. The nipples are surgically absent bilaterally. There are no significant skin changes overlying the breasts. There is no skin dimpling with movement of the pectoralis.   Integumentary: no rashes or skin lesions present  Neurologic: cranial nerves intact, no signs of peripheral neurological deficit, motor/sensory function intact      Assessment and Plan:     Encounter Diagnoses   Name Primary?    Malignant neoplasm of central portion of right breast in female, estrogen receptor negative Yes    Breast cancer, BRCA1 positive, right     S/P bilateral mastectomy                 Adarelis was seen today for follow-up.    Diagnoses and all orders for this visit:    Malignant neoplasm of central portion of right breast in female, estrogen receptor negative    Breast cancer, BRCA1 positive, right    S/P bilateral mastectomy           Plan:         RTC in 6 months for CBE.    Follow up with medical oncology for adjuvant treatments.    Patient is started screening for melanoma and establishing with GI for screenings as well.    Preventative hysterotomy and oophorectomy scheduled for later this month.    Healthy lifestyle guidelines were reviewed. She was encouraged to engage in regular exercise, maintain a healthy body weight, and avoid excessive alcohol consumption. Healthy nutritional guidelines were also discussed. Self-breast  examination was reviewed with the patient in detail and she was encouraged to perform this on a monthly basis.         All of her questions were answered. She was advised to call if she develops any questions or concerns.    Shavon Rizzo PA-C      Total time on the date of the visit ranged from 30-39 mins (57161). Total time includes both face-to-face and non-face-to-face time personally spent by myself on the day of the visit.    Non-face-to-face time included:  _X_ preparing to see the patient such as reviewing the patient record  __ obtaining and reviewing separately obtained history  _X_ independently interpreting results  _X_ documenting clinical information in electronic health record.    Face-to-face time included:  _X_ performing an appropriate history and examination  _X_ communicating results to the patient  _X_ counseling and educating the patient  __ ordering appropriate medications  __ ordering appropriate tests  _X_ ordering appropriate procedures (including follow-up)  _X_ answering any questions the patient had    Total Time spent on date of visit: 35 minutes

## 2023-11-16 NOTE — PROGRESS NOTES
Subjective:      Patient ID: Rob Dennison is a 43 y.o. female.    Chief Complaint: Follow-up (6 mth)      HPI: Patient here today for 6 month recheck.  It appears she missed previously scheduled appt with Dr. Waller in Aug.   Since our last visit she was dx'ed with breast cancer, triple negative.  She completed chemo in [place] and had a double mastectomy at the end of January. +BRCA 1 gene with upcoming complete hysterectomy in 2 weeks.    She states that she has inc issues with fatigue and daytime drowsiness since youth.  She states that she has trouble staying awake during the day. Previous sleep study, which she was not able to complete.  Issues with staying asleep with sleeping usually 3-4 hr a night.    Review of patient's allergies indicates:   Allergen Reactions    Fosaprepitant Other (See Comments)     Flushing, nausea, abdominal discomfort    Adhesive     Erythromycin base Other (See Comments) and Rash     Fever       Review of Systems  Constitutional: No fever, No chills, No sweats, fatigue, No weight loss.  Respiratory: No shortness of breath, No exertional dyspnea.   Cardiovascular: No chest pain, No palpitations, occ LE peripheral edema.  Gastrointestinal: No nausea, No vomiting, No diarrhea, No rectal bleeding, No constipation, No abdominal pain.  Neurologic: No altered mental status, No headaches.Insomnia  Psychiatrics: No anxiety,No depression, No SI/HI.  Objective:   Visit Vitals  /72   Pulse 92   Temp 98.2 °F (36.8 °C) (Temporal)   Ht 5' (1.524 m)   Wt 80.7 kg (178 lb)   SpO2 98%   BMI 34.76 kg/m²     The patient's weight trend is below:   Wt Readings from Last 4 Encounters:   11/16/23 80.7 kg (178 lb)   11/16/23 79.4 kg (175 lb)   05/11/23 77.1 kg (170 lb)   02/27/23 73.5 kg (162 lb)        Physical Exam  Vitals and nursing note reviewed.   Constitutional:       General: She is not in acute distress.     Appearance: Normal appearance. She is normal weight. She is not ill-appearing, toxic-appearing  or diaphoretic.   HENT:      Head: Normocephalic and atraumatic.   Cardiovascular:      Rate and Rhythm: Normal rate and regular rhythm.      Heart sounds: Normal heart sounds.   Pulmonary:      Effort: Pulmonary effort is normal.      Breath sounds: Normal breath sounds.   Abdominal:      General: Abdomen is flat. Bowel sounds are normal. There is no distension.      Palpations: Abdomen is soft. There is no mass.      Tenderness: There is no abdominal tenderness. There is no guarding or rebound.      Hernia: No hernia is present.   Musculoskeletal:         General: Normal range of motion.      Right lower leg: No edema.      Left lower leg: No edema.   Skin:     General: Skin is warm and dry.   Neurological:      General: No focal deficit present.      Mental Status: She is alert and oriented to person, place, and time. Mental status is at baseline.         Assessment/Plan:   1. Right-sided chest pain  Previous eval with CV with reportedly neg eval.    2. Primary osteoarthritis of both hands  Per Rheum  Suspect overuse and need for rest  Tylenol Arthritis 2 tabs 3x/day    3. Hypersomnia  Refer back to Neuro for repeat home study    - Ambulatory referral/consult to Sleep Disorders; Future    4. Other insomnia  Trial of trazodone 25mg-50mg at bedtime  Good sleep hygiene    - traZODone (DESYREL) 50 MG tablet; Take 0.5 tablets (25 mg total) by mouth nightly as needed for Insomnia.  Dispense: 30 tablet; Refill: 11    5. Other fatigue  See #3 and #4    - Ambulatory referral/consult to Sleep Disorders; Future    6. Need for vaccination  PNA 20 and flu given    7. Malignant neoplasm of upper-outer quadrant of right breast in female, estrogen receptor negative  S/p brittney mastectomy and pending complete hysterectomy      Medication List with Changes/Refills   New Medications    TRAZODONE (DESYREL) 50 MG TABLET    Take 0.5 tablets (25 mg total) by mouth nightly as needed for Insomnia.   Current Medications    ACYCLOVIR  (ZOVIRAX) 400 MG TABLET    Take 400 mg by mouth once daily.    CARVEDILOL (COREG) 3.125 MG TABLET    SMARTSI Tablet(s) By Mouth Morning-Evening    CYANOCOBALAMIN 2000 MCG TABLET    Take 2,000 mcg by mouth once daily.    DULOXETINE (CYMBALTA) 60 MG CAPSULE    TAKE 2 CAPSULES BY MOUTH EVERY DAY    FLUTICASONE PROPIONATE (FLONASE) 50 MCG/ACTUATION NASAL SPRAY    1 spray by Each Nostril route once daily.    HYDROXYZINE PAMOATE (VISTARIL) 50 MG CAP    Take by mouth.    HYOSCYAMINE (LEVSIN/SL) 0.125 MG SUBL    DISSOLVE 1 TABLET UNDER THE TONGUE EVERY 6 HOURS AS NEEDED FOR CRAMPING OR DIARRHEA    IPRATROPIUM (ATROVENT) 21 MCG (0.03 %) NASAL SPRAY    SMARTSI Spray(s) Both Nares    LIDOCAINE VISCOUS 2 % SOLUTION    SMARTSIG:10 Milliliter(s) By Mouth Every 3 Hours PRN    LIDOCAINE-PRILOCAINE (EMLA) CREAM    Apply topically as needed.    LISINOPRIL 10 MG TABLET    TAKE 1 TABLET BY MOUTH DAILY    LORATADINE (CLARITIN) 10 MG TABLET    Take 10 mg by mouth once daily.    MELOXICAM (MOBIC) 15 MG TABLET    Take 1 tablet by mouth every morning.    MINOXIDIL (ROGAINE) 2 % EXTERNAL SOLUTION    Apply 1 car-units/100 mL topically 2 (two) times daily.    MULTIVITAMIN (THERAGRAN) PER TABLET    Take 1 tablet by mouth once daily.    OLANZAPINE (ZYPREXA) 2.5 MG TABLET    Take 2.5 mg by mouth every evening.    ONDANSETRON (ZOFRAN-ODT) 8 MG TBDL    8 mg.    PYRIDOXINE, VITAMIN B6, (B-6) 100 MG TAB    Take 50 mg by mouth once daily.    SPIRONOLACTONE (ALDACTONE) 100 MG TABLET    Take 100 mg by mouth every morning.    VITAMIN E 400 UNIT CAPSULE    Take 400 Units by mouth once daily.        Follow up in about 6 months (around 2024) for With Dr. Waller.    Chemistry:  Lab Results   Component Value Date     2023    K 4.4 2023    CHLORIDE 104 2023    BUN 9.0 2023    CREATININE 0.80 2023    EGFRNORACEVR >60 2023    GLUCOSE 96 2023    CALCIUM 9.7 2023    ALKPHOS 88 2023    LABPROT  6.9 02/17/2023    ALBUMIN 3.7 02/17/2023    BILIDIR 0.3 02/21/2022    IBILI 0.30 02/21/2022    AST 30 02/17/2023    ALT 34 02/17/2023        Lab Results   Component Value Date    HGBA1C 5.0 02/17/2023        Hematology:  Lab Results   Component Value Date    WBC 5.4 02/17/2023    HGB 12.4 02/17/2023    HCT 36.6 (L) 02/17/2023     02/17/2023       Lipid Panel:  Lab Results   Component Value Date    CHOL 229 (H) 02/17/2023    HDL 51 02/17/2023    .00 02/17/2023    TRIG 211 (H) 02/17/2023    TOTALCHOLEST 4 02/17/2023        Urine:  Lab Results   Component Value Date    APPEARANCEUA CLEAR 08/02/2019    PROTEINUA Negative 08/02/2019    LEUKOCYTESUR Negative 08/02/2019    RBCUA NONE SEEN 08/02/2019    WBCUA NONE SEEN 08/02/2019    BACTERIA NONE SEEN 08/02/2019

## 2023-11-20 ENCOUNTER — TELEPHONE (OUTPATIENT)
Dept: NEUROLOGY | Facility: CLINIC | Age: 43
End: 2023-11-20
Payer: COMMERCIAL

## 2023-11-20 DIAGNOSIS — G47.19 EXCESSIVE DAYTIME SLEEPINESS: ICD-10-CM

## 2023-11-20 DIAGNOSIS — G47.00 INSOMNIA, UNSPECIFIED TYPE: ICD-10-CM

## 2023-11-20 DIAGNOSIS — G47.33 OSA (OBSTRUCTIVE SLEEP APNEA): Primary | ICD-10-CM

## 2023-11-27 ENCOUNTER — PROCEDURE VISIT (OUTPATIENT)
Dept: SLEEP MEDICINE | Facility: HOSPITAL | Age: 43
End: 2023-11-27
Attending: NURSE PRACTITIONER
Payer: COMMERCIAL

## 2023-11-27 DIAGNOSIS — G47.19 EXCESSIVE DAYTIME SLEEPINESS: ICD-10-CM

## 2023-11-27 DIAGNOSIS — G47.33 OSA (OBSTRUCTIVE SLEEP APNEA): ICD-10-CM

## 2023-11-27 DIAGNOSIS — G47.00 INSOMNIA, UNSPECIFIED TYPE: ICD-10-CM

## 2023-11-27 PROCEDURE — 95806 SLEEP STUDY UNATT&RESP EFFT: CPT | Mod: 26,,, | Performed by: SPECIALIST

## 2023-11-27 PROCEDURE — G0399 HOME SLEEP TEST/TYPE 3 PORTA: HCPCS

## 2023-11-27 PROCEDURE — 95806 PR SLEEP STUDY, UNATTENDED, SIMUL RECORD HR/O2 SAT/RESP FLOW/RESP EFFT: ICD-10-PCS | Mod: 26,,, | Performed by: SPECIALIST

## 2023-12-21 ENCOUNTER — OFFICE VISIT (OUTPATIENT)
Dept: NEUROLOGY | Facility: CLINIC | Age: 43
End: 2023-12-21
Payer: COMMERCIAL

## 2023-12-21 DIAGNOSIS — G47.33 OSA (OBSTRUCTIVE SLEEP APNEA): Primary | ICD-10-CM

## 2023-12-21 DIAGNOSIS — G47.10 HYPERSOMNIA: ICD-10-CM

## 2023-12-21 DIAGNOSIS — R53.83 OTHER FATIGUE: ICD-10-CM

## 2023-12-21 PROCEDURE — 4010F PR ACE/ARB THEARPY RXD/TAKEN: ICD-10-PCS | Mod: CPTII,95,, | Performed by: NURSE PRACTITIONER

## 2023-12-21 PROCEDURE — 3044F HG A1C LEVEL LT 7.0%: CPT | Mod: CPTII,95,, | Performed by: NURSE PRACTITIONER

## 2023-12-21 PROCEDURE — 1159F MED LIST DOCD IN RCRD: CPT | Mod: CPTII,95,, | Performed by: NURSE PRACTITIONER

## 2023-12-21 PROCEDURE — 1160F RVW MEDS BY RX/DR IN RCRD: CPT | Mod: CPTII,95,, | Performed by: NURSE PRACTITIONER

## 2023-12-21 PROCEDURE — 99203 OFFICE O/P NEW LOW 30 MIN: CPT | Mod: 95,,, | Performed by: NURSE PRACTITIONER

## 2023-12-21 PROCEDURE — 3044F PR MOST RECENT HEMOGLOBIN A1C LEVEL <7.0%: ICD-10-PCS | Mod: CPTII,95,, | Performed by: NURSE PRACTITIONER

## 2023-12-21 PROCEDURE — 1160F PR REVIEW ALL MEDS BY PRESCRIBER/CLIN PHARMACIST DOCUMENTED: ICD-10-PCS | Mod: CPTII,95,, | Performed by: NURSE PRACTITIONER

## 2023-12-21 PROCEDURE — 4010F ACE/ARB THERAPY RXD/TAKEN: CPT | Mod: CPTII,95,, | Performed by: NURSE PRACTITIONER

## 2023-12-21 PROCEDURE — 1159F PR MEDICATION LIST DOCUMENTED IN MEDICAL RECORD: ICD-10-PCS | Mod: CPTII,95,, | Performed by: NURSE PRACTITIONER

## 2023-12-21 PROCEDURE — 99203 PR OFFICE/OUTPT VISIT, NEW, LEVL III, 30-44 MIN: ICD-10-PCS | Mod: 95,,, | Performed by: NURSE PRACTITIONER

## 2023-12-21 NOTE — PROGRESS NOTES
Virtual Visit Note    Subjective:          Patient ID: Rob Dennison is a 43 y.o. female.    Chief Complaint: results of HST    HPI:           The patient location is: home   Visit type: audiovisual    Patient with c/o excessive daytime sleepiness, unrefreshed awakenings, snoring, overweight     HST:   THAI 9  Jn 78%  <88% 5 minutes     This is a telemedicine note. After obtaining verbal consent/patient identification using name and , patient was treated using real time audio/video, according to [facility] protocols. Tristen WEINER NP [distant provider], conducted the visit from location identified below. The patient participated in the visit at a non-[facility] location selected by the patient (or patients representative), identified below. I am licensed in the state where the patient stated they are located. The patient (or patients representative) stated that they understood and accepted the privacy and security risks to their information at their location.    Distant provider was located at [facility]    Each patient to whom he or she provides medical services by telemedicine is:  (1) informed of the relationship between the physician and patient and the respective role of any other health care provider with respect to management of the patient; and (2) notified that he or she may decline to receive medical services by telemedicine and may withdraw from such care at any time.            Past Medical History:   Diagnosis Date    Anemia     Oct. Nov of 2022    Anxiety     Breast cancer in female     right    Depression     Elevated troponin 2022    Hypertension     Menopause     Hot flashes began before chemo in July       Past Surgical History:   Procedure Laterality Date    ABDOMINAL SURGERY      Gall bladder removal     AXILLARY NODE DISSECTION Right 2023    Procedure: LYMPHADENECTOMY, AXILLARY;  Surgeon: Genie Josue MD;  Location: [facility] OR;  Service: General;   Laterality: Right;    BACK SURGERY      CHOLECYSTECTOMY      ENDOSCOPIC RELEASE OF BOTH CARPAL TUNNELS Bilateral     SENTINEL LYMPH NODE BIOPSY Right 2023    Procedure: BIOPSY, LYMPH NODE, SENTINEL Right MagTrace Injected in office SentiMag needed No NUC MED;  Surgeon: Genie Josue MD;  Location: [facility] OR;  Service: General;  Laterality: Right;  MagTrace Injected in office  SentiMag needed  No NUC MED    SIMPLE MASTECTOMY Bilateral 2023    Procedure: MASTECTOMY, SIMPLE  Bilateral;  Surgeon: Genie Josue MD;  Location: [facility] OR;  Service: General;  Laterality: Bilateral;    SPINE SURGERY      Shaved herniated disc    TRIGGER FINGER RELEASE Right     WISDOM TOOTH EXTRACTION         Family History   Problem Relation Age of Onset    Hypertension Mother     Hyperlipidemia Mother     Arthritis Mother     Hyperlipidemia Father     Stroke Father     Skin cancer Father     Cataracts Father     Alcohol abuse Father     Colon cancer Paternal Grandfather        Social History     Socioeconomic History    Marital status:    Tobacco Use    Smoking status: Never    Smokeless tobacco: Never   Substance and Sexual Activity    Alcohol use: Yes     Comment: Occasionally    Drug use: Never    Sexual activity: Yes     Partners: Female     Birth control/protection: None       Review of patient's allergies indicates:   Allergen Reactions    Fosaprepitant Other (See Comments)     Flushing, nausea, abdominal discomfort    Adhesive     Erythromycin base Other (See Comments) and Rash     Fever         Current Outpatient Medications:     acyclovir (ZOVIRAX) 400 MG tablet, Take 400 mg by mouth once daily., Disp: , Rfl:     carvediloL (COREG) 3.125 MG tablet, SMARTSI Tablet(s) By Mouth Morning-Evening, Disp: , Rfl:     cyanocobalamin 2000 MCG tablet, Take 2,000 mcg by mouth once daily., Disp: , Rfl:     DULoxetine (CYMBALTA) 60 MG capsule, TAKE 2 CAPSULES BY MOUTH EVERY DAY, Disp: 360 capsule, Rfl: 1    fluticasone  propionate (FLONASE) 50 mcg/actuation nasal spray, 1 spray by Each Nostril route once daily., Disp: , Rfl:     hydrOXYzine pamoate (VISTARIL) 50 MG Cap, Take by mouth., Disp: , Rfl:     ipratropium (ATROVENT) 21 mcg (0.03 %) nasal spray, SMARTSI Spray(s) Both Nares, Disp: , Rfl:     LIDOCAINE VISCOUS 2 % solution, SMARTSIG:10 Milliliter(s) By Mouth Every 3 Hours PRN, Disp: , Rfl:     LIDOcaine-prilocaine (EMLA) cream, Apply topically as needed., Disp: , Rfl:     lisinopriL 10 MG tablet, TAKE 1 TABLET BY MOUTH DAILY, Disp: 90 tablet, Rfl: 0    loratadine (CLARITIN) 10 mg tablet, Take 10 mg by mouth once daily., Disp: , Rfl:     meloxicam (MOBIC) 15 MG tablet, Take 1 tablet by mouth every morning., Disp: , Rfl:     minoxidiL (ROGAINE) 2 % external solution, Apply 1 car-units/100 mL topically 2 (two) times daily., Disp: , Rfl:     multivitamin (THERAGRAN) per tablet, Take 1 tablet by mouth once daily., Disp: , Rfl:     OLANZapine (ZYPREXA) 2.5 MG tablet, Take 2.5 mg by mouth every evening., Disp: , Rfl:     ondansetron (ZOFRAN-ODT) 8 MG TbDL, 8 mg., Disp: , Rfl:     pyridoxine, vitamin B6, (B-6) 100 MG Tab, Take 50 mg by mouth once daily., Disp: , Rfl:     traZODone (DESYREL) 50 MG tablet, Take 0.5 tablets (25 mg total) by mouth nightly as needed for Insomnia., Disp: 30 tablet, Rfl: 11    vitamin E 400 UNIT capsule, Take 400 Units by mouth once daily., Disp: , Rfl:   No current facility-administered medications for this visit.    Facility-Administered Medications Ordered in Other Visits:     scopolamine 1.3-1.5 mg (1 mg over 3 days) 1 patch, 1 patch, Transdermal, Q3 Days, Sridhar Mao MD, 1 patch at 22 0624     Objective:      Physical Exam:  General- Patient alert and oriented x3 in NAD  Speech - nml  HEENT- EOMI  Resp- No increased WOB noted. Not using accessory muscles.  Skin-  No Jaundice. No visible skin lesions.        Assessment/Plan:     Problem List Items Addressed This Visit       EUGENE  (obstructive sleep apnea) - Primary      Hypersomnia        Other fatigue        Start autopap 5-20 cm   DME: [facility]  Follow up in 8-10 weeks - telemed              Tristen Kovacs, MSN, APRN, AGACNP-BC

## 2024-02-03 DIAGNOSIS — I10 ESSENTIAL HYPERTENSION: ICD-10-CM

## 2024-02-05 ENCOUNTER — TELEPHONE (OUTPATIENT)
Dept: INTERNAL MEDICINE | Facility: CLINIC | Age: 44
End: 2024-02-05
Payer: COMMERCIAL

## 2024-02-05 RX ORDER — LISINOPRIL 10 MG/1
TABLET ORAL
Qty: 90 TABLET | Refills: 3 | Status: SHIPPED | OUTPATIENT
Start: 2024-02-05 | End: 2024-05-05

## 2024-02-05 NOTE — TELEPHONE ENCOUNTER
----- Message from Ila Omer sent at 2/5/2024  9:33 AM CST -----  .Type:  Patient Returning Call    Who Called:Soni with [employer]   Who Left Message for Patient:Soni  Does the patient know what this is regarding?:case management   Best Call Back Number:[phone]  Additional Information: Please call back about case management program has lost contact with patient    No need to call back unless you have any questions

## 2024-02-20 ENCOUNTER — TELEPHONE (OUTPATIENT)
Dept: NEUROLOGY | Facility: CLINIC | Age: 44
End: 2024-02-20
Payer: COMMERCIAL

## 2024-02-20 NOTE — TELEPHONE ENCOUNTER
----- Message from Opal Maynard RRT sent at 2/19/2024  4:10 PM CST -----  Regarding: setup notes  Patient was setup with: resmed airsense 10 autoset at 5-20cm    Patient was fitted with: N30 small with premium chinstrap  Patient was instructed on the proper use and operation of equipment. Patient verbalized understanding of instructions given. We will follow as needed.

## 2024-04-05 ENCOUNTER — OFFICE VISIT (OUTPATIENT)
Dept: NEUROLOGY | Facility: CLINIC | Age: 44
End: 2024-04-05
Payer: COMMERCIAL

## 2024-04-05 VITALS
BODY MASS INDEX: 34.36 KG/M2 | SYSTOLIC BLOOD PRESSURE: 120 MMHG | HEIGHT: 60 IN | DIASTOLIC BLOOD PRESSURE: 84 MMHG | WEIGHT: 175 LBS

## 2024-04-05 DIAGNOSIS — G47.33 OSA (OBSTRUCTIVE SLEEP APNEA): Primary | ICD-10-CM

## 2024-04-05 DIAGNOSIS — G47.19 EXCESSIVE DAYTIME SLEEPINESS: ICD-10-CM

## 2024-04-05 PROCEDURE — 3008F BODY MASS INDEX DOCD: CPT | Mod: CPTII,S$GLB,, | Performed by: NURSE PRACTITIONER

## 2024-04-05 PROCEDURE — 1160F RVW MEDS BY RX/DR IN RCRD: CPT | Mod: CPTII,S$GLB,, | Performed by: NURSE PRACTITIONER

## 2024-04-05 PROCEDURE — 99213 OFFICE O/P EST LOW 20 MIN: CPT | Mod: S$GLB,,, | Performed by: NURSE PRACTITIONER

## 2024-04-05 PROCEDURE — 1159F MED LIST DOCD IN RCRD: CPT | Mod: CPTII,S$GLB,, | Performed by: NURSE PRACTITIONER

## 2024-04-05 PROCEDURE — 3079F DIAST BP 80-89 MM HG: CPT | Mod: CPTII,S$GLB,, | Performed by: NURSE PRACTITIONER

## 2024-04-05 PROCEDURE — 3074F SYST BP LT 130 MM HG: CPT | Mod: CPTII,S$GLB,, | Performed by: NURSE PRACTITIONER

## 2024-04-05 PROCEDURE — 99999 PR PBB SHADOW E&M-EST. PATIENT-LVL IV: CPT | Mod: PBBFAC,,, | Performed by: NURSE PRACTITIONER

## 2024-04-05 PROCEDURE — 4010F ACE/ARB THERAPY RXD/TAKEN: CPT | Mod: CPTII,S$GLB,, | Performed by: NURSE PRACTITIONER

## 2024-04-05 NOTE — PROGRESS NOTES
Neurology Note - Sleep follow up    Subjective:         Patient ID: Rob Dennison is a 44 y.o. female.    Chief Complaint: F/U EUGENE; 8 week follow up     HPI:            Wears CPAP qhs and is tolerating it well. Sleeps better w CPAP. Sleeps 6 hrs a night and awakens 1 time for no reason occs is able to go right back to sleep. Awakens un refreshed and has EDS. Does not nap. [facility].       Compliance-03/06/2024-04/04/2024    AHI-6.9    > 4 hrs 73%   Leak 22 L/min    H/o chemo  Working 50+ hours per week   Needs new mask     Trazodone 25 mg at bed time helpful; higher dose made her really tired during the day     ROS: as per HPI, otherwise pertinent systems review is negative              4/5/2024     8:15 AM   EPWORTH SLEEPINESS SCALE   Sitting and reading 0   Watching TV 2   Sitting, inactive in a public place (e.g. a theatre or a meeting) 0   As a passenger in a car for an hour without a break 3   Lying down to rest in the afternoon when circumstances permit 2   Sitting and talking to someone 0   Sitting quietly after a lunch without alcohol 3   In a car, while stopped for a few minutes in traffic 0   Total score 10        Past Medical History:   Diagnosis Date    Anemia     Oct. Nov of 2022    Anxiety     Breast cancer in female     right    Depression     Elevated troponin 11/4/2022    Hypertension     Menopause     Hot flashes began before chemo in July       Past Surgical History:   Procedure Laterality Date    ABDOMINAL SURGERY      Gall bladder removal 2021    AXILLARY NODE DISSECTION Right 01/30/2023    Procedure: LYMPHADENECTOMY, AXILLARY;  Surgeon: Genie Josue MD;  [facility];  Service: General;  Laterality: Right;    BACK SURGERY      CHOLECYSTECTOMY      ENDOSCOPIC RELEASE OF BOTH CARPAL TUNNELS Bilateral     SENTINEL LYMPH NODE BIOPSY Right 01/30/2023    Procedure: BIOPSY, LYMPH NODE, SENTINEL Right MagTrace Injected in office SentiMag needed No NUC MED;  Surgeon: Genie Josue MD;   Location: [facility] OR;  Service: General;  Laterality: Right;  MagTrace Injected in office  SentiMag needed  No NUC MED    SIMPLE MASTECTOMY Bilateral 2023    Procedure: MASTECTOMY, SIMPLE  Bilateral;  Surgeon: Genie Josue MD;  Location: [facility] OR;  Service: General;  Laterality: Bilateral;    SPINE SURGERY      Shaved herniated disc    TRIGGER FINGER RELEASE Right     WISDOM TOOTH EXTRACTION         Family History   Problem Relation Age of Onset    Hypertension Mother     Hyperlipidemia Mother     Arthritis Mother     Hyperlipidemia Father     Stroke Father     Skin cancer Father     Cataracts Father     Alcohol abuse Father     Colon cancer Paternal Grandfather        Social History     Socioeconomic History    Marital status:    Tobacco Use    Smoking status: Never    Smokeless tobacco: Never   Substance and Sexual Activity    Alcohol use: Yes     Comment: Occasionally    Drug use: Never    Sexual activity: Yes     Partners: Female     Birth control/protection: None       Review of patient's allergies indicates:   Allergen Reactions    Fosaprepitant Other (See Comments)     Flushing, nausea, abdominal discomfort    Adhesive     Erythromycin base Other (See Comments) and Rash     Fever       Current Outpatient Medications   Medication Instructions    acyclovir (ZOVIRAX) 400 mg, Oral, Daily    carvediloL (COREG) 3.125 MG tablet SMARTSI Tablet(s) By Mouth Morning-Evening    cyanocobalamin 2,000 mcg, Oral, Daily    DULoxetine (CYMBALTA) 60 MG capsule TAKE 2 CAPSULES BY MOUTH EVERY DAY    fluticasone propionate (FLONASE) 50 mcg/actuation nasal spray 1 spray, Each Nostril, Daily    ipratropium (ATROVENT) 21 mcg (0.03 %) nasal spray SMARTSI Spray(s) Both Nares    LIDOCAINE VISCOUS 2 % solution SMARTSIG:10 Milliliter(s) By Mouth Every 3 Hours PRN    LIDOcaine-prilocaine (EMLA) cream Topical (Top), As needed (PRN)    lisinopriL 10 MG tablet TAKE 1 TABLET BY MOUTH DAILY    loratadine (CLARITIN) 10 mg,  Oral, Daily    meloxicam (MOBIC) 15 MG tablet 1 tablet, Oral, Every morning    minoxidiL (ROGAINE) 1 car-units/100 mL, Topical (Top), 2 times daily    multivitamin (THERAGRAN) per tablet 1 tablet, Oral, Daily    OLANZapine (ZYPREXA) 2.5 mg, Oral, Nightly    ondansetron (ZOFRAN-ODT) 8 MG TbDL 8 mg.    pyridoxine (vitamin B6) (B-6) 50 mg, Oral, Daily    traZODone (DESYREL) 25 mg, Oral, Nightly PRN    vitamin E 400 Units, Oral, Daily         Objective:      Exam:   /84   Ht 5' (1.524 m)   Wt 79.4 kg (175 lb)   BMI 34.18 kg/m²     Physical Exam  Vitals reviewed.   Constitutional:       Appearance: Normal appearance.      Accompanied by: alone   HENT:      Ears:      Comments: Hearing normal.  Eyes:      Extraocular Movements: Extraocular movements intact.      VF's ok  Cardiovascular:      Rate and Rhythm: Normal rate and regular rhythm.   Pulmonary:      Effort: Pulmonary effort is normal.      Breath sounds: Normal breath sounds.   Musculoskeletal:         General: Normal range of motion.   Skin:     General: Skin is warm and dry.   Neurological:      General: No focal deficit present.      Mental Status: alert and oriented to person, place, and time.      Speech: nml     Face: symmetric; tongue midline     Motor: nonlateralizing     Gait: unassisted and normal.  Psychiatric:         Mood and Affect: Mood normal.         Behavior: Behavior normal.         Assessment/Plan:     Problem List Items Addressed This Visit          Other    EUGENE (obstructive sleep apnea) - Primary    Patient is benefiting from PAP therapy; Encouraged continued use of PAP. Drowsy driving may still occur despite PAP use. Clinical follow up and replacement of supplies discussed.    She needs to replace her mask - will improve leak and ultimately the AHI; if this is not fruitful, will need PAP titration night     Download in 4 weeks; phone dialog to discuss results     DME:[facility] in 3 months        Other Visit Diagnoses        Excessive daytime sleepiness        Discussed wake promoting med; she was averse at this time               Tristen Kovacs, MSN, APRN, AGAP-BC

## 2024-04-30 DIAGNOSIS — F32.A DEPRESSION, UNSPECIFIED DEPRESSION TYPE: ICD-10-CM

## 2024-04-30 RX ORDER — DULOXETIN HYDROCHLORIDE 60 MG/1
CAPSULE, DELAYED RELEASE ORAL
Qty: 360 CAPSULE | Refills: 1 | Status: SHIPPED | OUTPATIENT
Start: 2024-04-30

## 2024-05-03 ENCOUNTER — TELEPHONE (OUTPATIENT)
Dept: NEUROLOGY | Facility: CLINIC | Age: 44
End: 2024-05-03
Payer: COMMERCIAL

## 2024-05-03 NOTE — TELEPHONE ENCOUNTER
----- Message from Yoanna De La Fuente MA sent at 4/10/2024  6:42 AM CDT -----  4 week pap compliance download reminder

## 2024-05-09 DIAGNOSIS — Z00.00 WELLNESS EXAMINATION: Primary | ICD-10-CM

## 2024-05-09 DIAGNOSIS — R73.03 PREDIABETES: ICD-10-CM

## 2024-05-09 DIAGNOSIS — E66.09 CLASS 2 OBESITY DUE TO EXCESS CALORIES WITHOUT SERIOUS COMORBIDITY WITH BODY MASS INDEX (BMI) OF 35.0 TO 35.9 IN ADULT: ICD-10-CM

## 2024-05-09 DIAGNOSIS — Z13.6 SCREENING FOR ISCHEMIC HEART DISEASE: ICD-10-CM

## 2024-05-09 NOTE — TELEPHONE ENCOUNTER
PAP data unchanged; please ensure she changed her mask. If so, I ask that she stay the course; AHI 7

## 2024-05-10 ENCOUNTER — TELEPHONE (OUTPATIENT)
Dept: INTERNAL MEDICINE | Facility: CLINIC | Age: 44
End: 2024-05-10
Payer: COMMERCIAL

## 2024-05-10 NOTE — TELEPHONE ENCOUNTER
----- Message from Shahnaz Almeida LPN sent at 5/9/2024  8:07 AM CDT -----  Regarding: ORALIA Waller 5/17/24 @0940  Are there any outstanding tasks in the chart? Pt will need fasting labs- change appt to wellness    Is there any documentation of tasks? no    Please tell patient to bring living will, power of , or advance directive document to visit if they have it.

## 2024-05-16 ENCOUNTER — LAB VISIT (OUTPATIENT)
Dept: LAB | Facility: HOSPITAL | Age: 44
End: 2024-05-16
Attending: INTERNAL MEDICINE
Payer: COMMERCIAL

## 2024-05-16 DIAGNOSIS — Z13.6 SCREENING FOR ISCHEMIC HEART DISEASE: ICD-10-CM

## 2024-05-16 DIAGNOSIS — Z00.00 WELLNESS EXAMINATION: ICD-10-CM

## 2024-05-16 DIAGNOSIS — R73.03 PREDIABETES: ICD-10-CM

## 2024-05-16 DIAGNOSIS — E66.09 CLASS 2 OBESITY DUE TO EXCESS CALORIES WITHOUT SERIOUS COMORBIDITY WITH BODY MASS INDEX (BMI) OF 35.0 TO 35.9 IN ADULT: ICD-10-CM

## 2024-05-16 LAB
ALBUMIN SERPL-MCNC: 4.1 G/DL (ref 3.5–5)
ALBUMIN/GLOB SERPL: 1.2 RATIO (ref 1.1–2)
ALP SERPL-CCNC: 121 UNIT/L (ref 40–150)
ALT SERPL-CCNC: 41 UNIT/L (ref 0–55)
AST SERPL-CCNC: 33 UNIT/L (ref 5–34)
BASOPHILS # BLD AUTO: 0.02 X10(3)/MCL
BASOPHILS NFR BLD AUTO: 0.4 %
BILIRUB SERPL-MCNC: 0.6 MG/DL
BUN SERPL-MCNC: 12 MG/DL (ref 7–18.7)
CALCIUM SERPL-MCNC: 9.4 MG/DL (ref 8.4–10.2)
CHLORIDE SERPL-SCNC: 102 MMOL/L (ref 98–107)
CHOLEST SERPL-MCNC: 214 MG/DL
CHOLEST/HDLC SERPL: 4 {RATIO} (ref 0–5)
CO2 SERPL-SCNC: 30 MMOL/L (ref 22–29)
CREAT SERPL-MCNC: 0.91 MG/DL (ref 0.55–1.02)
EOSINOPHIL # BLD AUTO: 0.12 X10(3)/MCL (ref 0–0.9)
EOSINOPHIL NFR BLD AUTO: 2.4 %
ERYTHROCYTE [DISTWIDTH] IN BLOOD BY AUTOMATED COUNT: 12.7 % (ref 11.5–17)
EST. AVERAGE GLUCOSE BLD GHB EST-MCNC: 114 MG/DL
GFR SERPLBLD CREATININE-BSD FMLA CKD-EPI: >60 ML/MIN/1.73/M2
GLOBULIN SER-MCNC: 3.3 GM/DL (ref 2.4–3.5)
GLUCOSE SERPL-MCNC: 117 MG/DL (ref 74–100)
HBA1C MFR BLD: 5.6 %
HCT VFR BLD AUTO: 39.7 % (ref 37–47)
HDLC SERPL-MCNC: 59 MG/DL (ref 35–60)
HGB BLD-MCNC: 13.6 G/DL (ref 12–16)
IMM GRANULOCYTES # BLD AUTO: 0.01 X10(3)/MCL (ref 0–0.04)
IMM GRANULOCYTES NFR BLD AUTO: 0.2 %
LDLC SERPL CALC-MCNC: 116 MG/DL (ref 50–140)
LYMPHOCYTES # BLD AUTO: 1.54 X10(3)/MCL (ref 0.6–4.6)
LYMPHOCYTES NFR BLD AUTO: 30.9 %
MCH RBC QN AUTO: 30.5 PG (ref 27–31)
MCHC RBC AUTO-ENTMCNC: 34.3 G/DL (ref 33–36)
MCV RBC AUTO: 89 FL (ref 80–94)
MONOCYTES # BLD AUTO: 0.56 X10(3)/MCL (ref 0.1–1.3)
MONOCYTES NFR BLD AUTO: 11.2 %
NEUTROPHILS # BLD AUTO: 2.74 X10(3)/MCL (ref 2.1–9.2)
NEUTROPHILS NFR BLD AUTO: 54.9 %
NRBC BLD AUTO-RTO: 0 %
PLATELET # BLD AUTO: 305 X10(3)/MCL (ref 130–400)
PMV BLD AUTO: 9.3 FL (ref 7.4–10.4)
POTASSIUM SERPL-SCNC: 4.1 MMOL/L (ref 3.5–5.1)
PROT SERPL-MCNC: 7.4 GM/DL (ref 6.4–8.3)
RBC # BLD AUTO: 4.46 X10(6)/MCL (ref 4.2–5.4)
SODIUM SERPL-SCNC: 139 MMOL/L (ref 136–145)
TRIGL SERPL-MCNC: 197 MG/DL (ref 37–140)
VLDLC SERPL CALC-MCNC: 39 MG/DL
WBC # SPEC AUTO: 4.99 X10(3)/MCL (ref 4.5–11.5)

## 2024-05-16 PROCEDURE — 83036 HEMOGLOBIN GLYCOSYLATED A1C: CPT

## 2024-05-16 PROCEDURE — 80053 COMPREHEN METABOLIC PANEL: CPT

## 2024-05-16 PROCEDURE — 85025 COMPLETE CBC W/AUTO DIFF WBC: CPT

## 2024-05-16 PROCEDURE — 80061 LIPID PANEL: CPT

## 2024-05-16 PROCEDURE — 36415 COLL VENOUS BLD VENIPUNCTURE: CPT

## 2024-05-17 ENCOUNTER — OFFICE VISIT (OUTPATIENT)
Dept: INTERNAL MEDICINE | Facility: CLINIC | Age: 44
End: 2024-05-17
Payer: COMMERCIAL

## 2024-05-17 VITALS
BODY MASS INDEX: 34.95 KG/M2 | SYSTOLIC BLOOD PRESSURE: 110 MMHG | DIASTOLIC BLOOD PRESSURE: 72 MMHG | HEIGHT: 60 IN | HEART RATE: 78 BPM | RESPIRATION RATE: 20 BRPM | OXYGEN SATURATION: 97 % | WEIGHT: 178 LBS

## 2024-05-17 DIAGNOSIS — E66.01 MORBID (SEVERE) OBESITY DUE TO EXCESS CALORIES: ICD-10-CM

## 2024-05-17 DIAGNOSIS — Z17.1 MALIGNANT NEOPLASM OF UPPER-OUTER QUADRANT OF RIGHT BREAST IN FEMALE, ESTROGEN RECEPTOR NEGATIVE: ICD-10-CM

## 2024-05-17 DIAGNOSIS — I10 ESSENTIAL HYPERTENSION: Primary | ICD-10-CM

## 2024-05-17 DIAGNOSIS — F41.9 ANXIETY: ICD-10-CM

## 2024-05-17 DIAGNOSIS — C50.411 MALIGNANT NEOPLASM OF UPPER-OUTER QUADRANT OF RIGHT BREAST IN FEMALE, ESTROGEN RECEPTOR NEGATIVE: ICD-10-CM

## 2024-05-17 DIAGNOSIS — Z00.00 WELLNESS EXAMINATION: ICD-10-CM

## 2024-05-17 PROCEDURE — 99396 PREV VISIT EST AGE 40-64: CPT | Mod: ,,, | Performed by: INTERNAL MEDICINE

## 2024-05-17 PROCEDURE — 3078F DIAST BP <80 MM HG: CPT | Mod: CPTII,,, | Performed by: INTERNAL MEDICINE

## 2024-05-17 PROCEDURE — 3074F SYST BP LT 130 MM HG: CPT | Mod: CPTII,,, | Performed by: INTERNAL MEDICINE

## 2024-05-17 PROCEDURE — 3008F BODY MASS INDEX DOCD: CPT | Mod: CPTII,,, | Performed by: INTERNAL MEDICINE

## 2024-05-17 PROCEDURE — 4010F ACE/ARB THERAPY RXD/TAKEN: CPT | Mod: CPTII,,, | Performed by: INTERNAL MEDICINE

## 2024-05-17 PROCEDURE — 1160F RVW MEDS BY RX/DR IN RCRD: CPT | Mod: CPTII,,, | Performed by: INTERNAL MEDICINE

## 2024-05-17 PROCEDURE — 3044F HG A1C LEVEL LT 7.0%: CPT | Mod: CPTII,,, | Performed by: INTERNAL MEDICINE

## 2024-05-17 PROCEDURE — 1159F MED LIST DOCD IN RCRD: CPT | Mod: CPTII,,, | Performed by: INTERNAL MEDICINE

## 2024-05-17 RX ORDER — BUSPIRONE HYDROCHLORIDE 5 MG/1
5 TABLET ORAL 3 TIMES DAILY PRN
Qty: 60 TABLET | Refills: 11 | Status: SHIPPED | OUTPATIENT
Start: 2024-05-17 | End: 2025-05-17

## 2024-05-17 NOTE — PROGRESS NOTES
Subjective:      Chief Complaint: Annual Exam      HPI:She is here for a wellness.  She c/o anxiety -- stress with work and persona issues.  She takes 120 mg of cymbalta.    She is seeing rTisten Kovacs and has a cpap machine.  She is taking trazodone, which has helped her a lot.  She doesn't feel like the depression is a major issue.  She doesn't check her BP at home.    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     Change in Nail Appearance 12/22/2022   Depression 12/22/2022   Cardiotoxicity 11/1/2022    Formatting of this note is different from the original.  Baseline echo EF 55-65%, normal GLS 6/29/2022  EF 55-65%, normal GLS 9/16/2022  EF 55-65%, abn GLS -14.22-by echo 11/1/2022    CMR 11/2022- normal LV size and function, negative for myocarditis  Formatting of this note might be different from the original. Baseline echo EF 55-65%, normal GLS 6/29/2022 EF 55-65%, normal GLS 9/16/2022 Mild elevation in troponin October 28, 2023 EF 55-65%, abn GLS -14.22 %  11/1/2022 CMR 11/2022- normal LV size and function, negative for myocarditis EF 55-65, abn GLS -14.5% 3/2023 EF 55-65, borderline GLS -16.3  9/2023 Last Assessment & Plan: Formatting of this note might be different from the original. 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  Cont coreg, lisinopril.     Malignant Neoplasm of Upper-Outer Quadrant of Right Breast in Female, Estrogen Receptor Negative 6/2/2022    Last Assessment & Plan:   Formatting of this note might be  different from the original.  · History & Physical Had first dose of chemotherapy on 2022 and it is mentioned in onc notes that she was to receive Neulasta though I do not see that it was actually administered.  Consider discussing case with heme-onc.    Discharge Summary  Patient with known history of stage IIIb triple negative breast cancer status postchemotherapy on 2022, complicated by febrile neutropenia.  She has close outpatient follow-up scheduled with Dr. Del Valle this week.     Follow-up Patient with known known history of stage IIb triple negative breast cancer status postchemotherapy 2022, complicated by neutropenia..  Will need close outpatient follow-up with clinic with Dr. Del Valle     Elevated Troponin (Resolved) 2022        The patient's Health Maintenance was reviewed and the following appears to be due:   Health Maintenance Due   Topic Date Due    COVID-19 Vaccine (3 - Pfizer risk series) 2021    Colorectal Cancer Screening  Never done       Past Medical History:  Past Medical History:   Diagnosis Date    Anemia     Oct. Nov of 2022    Anxiety     Breast cancer in female     right    Depression     Elevated troponin 2022    Hypertension     Menopause     Hot flashes began before chemo in July     Review of patient's allergies indicates:   Allergen Reactions    Fosaprepitant Other (See Comments)     Flushing, nausea, abdominal discomfort    Adhesive     Erythromycin base Other (See Comments) and Rash     Fever     Current Outpatient Medications on File Prior to Visit   Medication Sig Dispense Refill    acyclovir (ZOVIRAX) 400 MG tablet Take 400 mg by mouth once daily.      carvediloL (COREG) 3.125 MG tablet SMARTSI Tablet(s) By Mouth Morning-Evening      DULoxetine (CYMBALTA) 60 MG capsule TAKE 2 CAPSULES BY MOUTH EVERY  capsule 1    fluticasone propionate (FLONASE) 50 mcg/actuation nasal spray 1 spray by Each Nostril route once daily.      LIDOcaine-prilocaine  (EMLA) cream Apply topically as needed.      loratadine (CLARITIN) 10 mg tablet Take 10 mg by mouth once daily.      multivit-iron-FA-calcium-mins 9 mg iron-400 mcg Tab tablet Take 1 tablet by mouth once daily.      pyridoxine, vitamin B6, (B-6) 100 MG Tab Take 50 mg by mouth once daily.      traZODone (DESYREL) 50 MG tablet Take 0.5 tablets (25 mg total) by mouth nightly as needed for Insomnia. 30 tablet 11    [DISCONTINUED] meloxicam (MOBIC) 15 MG tablet Take 1 tablet by mouth every morning.      lisinopriL 10 MG tablet TAKE 1 TABLET BY MOUTH DAILY 90 tablet 3    [DISCONTINUED] cyanocobalamin 2000 MCG tablet Take 2,000 mcg by mouth once daily.      [DISCONTINUED] ipratropium (ATROVENT) 21 mcg (0.03 %) nasal spray SMARTSI Spray(s) Both Nares      [DISCONTINUED] LIDOCAINE VISCOUS 2 % solution SMARTSIG:10 Milliliter(s) By Mouth Every 3 Hours PRN      [DISCONTINUED] minoxidiL (ROGAINE) 2 % external solution Apply 1 car-units/100 mL topically 2 (two) times daily.      [DISCONTINUED] multivitamin (THERAGRAN) per tablet Take 1 tablet by mouth once daily.      [DISCONTINUED] OLANZapine (ZYPREXA) 2.5 MG tablet Take 2.5 mg by mouth every evening.      [DISCONTINUED] ondansetron (ZOFRAN-ODT) 8 MG TbDL 8 mg.      [DISCONTINUED] vitamin E 400 UNIT capsule Take 400 Units by mouth once daily.       Current Facility-Administered Medications on File Prior to Visit   Medication Dose Route Frequency Provider Last Rate Last Admin    scopolamine 1.3-1.5 mg (1 mg over 3 days) 1 patch  1 patch Transdermal Q3 Days Sridhar Mao MD   1 patch at 22 0624       Review of Systems   Respiratory:  Positive for shortness of breath (had it one day, she thinks she forgot to take her medicine.-- she thinks itwas just anxiety).    Cardiovascular:  Positive for chest pain (sometimes at random and usually on the right side.).       Objective:   /72 (BP Location: Left arm, Patient Position: Sitting, BP Method: Large (Manual))    Pulse 78   Resp 20   Ht 5' (1.524 m)   Wt 80.7 kg (178 lb)   SpO2 97%   BMI 34.76 kg/m²     Physical Exam  Constitutional:       General: She is not in acute distress.     Appearance: Normal appearance.   HENT:      Head: Normocephalic and atraumatic.   Eyes:      General: No scleral icterus.     Conjunctiva/sclera: Conjunctivae normal.   Neck:      Vascular: No carotid bruit.   Cardiovascular:      Rate and Rhythm: Normal rate and regular rhythm.      Pulses: Normal pulses.      Heart sounds: Normal heart sounds. No murmur heard.     No friction rub. No gallop.   Pulmonary:      Effort: Pulmonary effort is normal.      Breath sounds: Normal breath sounds.   Abdominal:      General: Bowel sounds are normal.      Palpations: Abdomen is soft. There is no mass.      Tenderness: There is no abdominal tenderness. There is no guarding or rebound.   Musculoskeletal:         General: No deformity.      Cervical back: No rigidity or tenderness.      Right lower leg: No edema.      Left lower leg: No edema.   Lymphadenopathy:      Cervical: No cervical adenopathy.   Skin:     General: Skin is warm and dry.      Coloration: Skin is not jaundiced or pale.      Findings: No erythema.   Neurological:      General: No focal deficit present.      Mental Status: She is alert and oriented to person, place, and time.      Gait: Gait normal.   Psychiatric:         Mood and Affect: Mood normal.         Behavior: Behavior normal.         Thought Content: Thought content normal.         Judgment: Judgment normal.       Assessment and Plan:     Wellness examination  Health Maintenance Due   Topic Date Due    COVID-19 Vaccine (3 - Pfizer risk series) 06/01/2021    Colorectal Cancer Screening  Never done       Recent labs reviewed and discussed.  Sheknows she is due for a colonoscopy.    Anxiety  Trial of buspar, continue cymbalta.    Malignant neoplasm of upper-outer quadrant of right breast in female, estrogen receptor negative  She  is followed by Dr. Del Valle in [place].    Morbid (severe) obesity due to excess calories  We discussed calorie restriction      Follow up in about 6 months (around 11/17/2024).    Medications Ordered This Encounter   Medications    busPIRone (BUSPAR) 5 MG Tab     Sig: Take 1 tablet (5 mg total) by mouth 3 (three) times daily as needed (anxiety).     Dispense:  60 tablet     Refill:  11     [unfilled]  No orders of the defined types were placed in this encounter.

## 2024-05-17 NOTE — ASSESSMENT & PLAN NOTE
Health Maintenance Due   Topic Date Due    COVID-19 Vaccine (3 - Pfizer risk series) 06/01/2021    Colorectal Cancer Screening  Never done       Recent labs reviewed and discussed.  Sheknows she is due for a colonoscopy.

## 2024-06-13 ENCOUNTER — DOCUMENTATION ONLY (OUTPATIENT)
Dept: INTERNAL MEDICINE | Facility: CLINIC | Age: 44
End: 2024-06-13
Payer: COMMERCIAL

## 2024-06-13 LAB — CRC RECOMMENDATION EXT: NORMAL

## 2024-06-21 ENCOUNTER — TELEPHONE (OUTPATIENT)
Dept: INTERNAL MEDICINE | Facility: CLINIC | Age: 44
End: 2024-06-21
Payer: COMMERCIAL

## 2024-06-21 NOTE — TELEPHONE ENCOUNTER
----- Message from Rodolfo Gunn sent at 6/21/2024  9:19 AM CDT -----  Type:  Needs Medical Advice    Who Called: pt  Symptoms (please be specific):    How long has patient had these symptoms:    Pharmacy name and phone #:    Would the patient rather a call back or a response via [facility]?   Best Call Back Number:  [phone]  Additional Information: pt called to follow up on pre employment physical form that was faxed over , would like to determine if she needs to sched an appt or if her last annual (05/17) can be used to complete paper work , please follow up

## 2024-06-21 NOTE — TELEPHONE ENCOUNTER
I checked with Dr. Waller. Patient will need appt to complete paperwork. I scheduled her for next Tuesday at 10:20am. She confirmed appt.

## 2024-06-25 ENCOUNTER — OFFICE VISIT (OUTPATIENT)
Dept: INTERNAL MEDICINE | Facility: CLINIC | Age: 44
End: 2024-06-25
Payer: COMMERCIAL

## 2024-06-25 VITALS
RESPIRATION RATE: 18 BRPM | DIASTOLIC BLOOD PRESSURE: 62 MMHG | BODY MASS INDEX: 34.36 KG/M2 | HEIGHT: 60 IN | OXYGEN SATURATION: 97 % | HEART RATE: 75 BPM | WEIGHT: 175 LBS | SYSTOLIC BLOOD PRESSURE: 110 MMHG

## 2024-06-25 DIAGNOSIS — Z02.89 ENCOUNTER FOR PHYSICAL EXAMINATION RELATED TO EMPLOYMENT: Primary | ICD-10-CM

## 2024-06-25 DIAGNOSIS — I10 ESSENTIAL HYPERTENSION: ICD-10-CM

## 2024-06-25 DIAGNOSIS — F32.A DEPRESSION, UNSPECIFIED DEPRESSION TYPE: ICD-10-CM

## 2024-06-25 DIAGNOSIS — B00.9 HSV-2 INFECTION: Primary | ICD-10-CM

## 2024-06-25 PROCEDURE — 3008F BODY MASS INDEX DOCD: CPT | Mod: CPTII,,, | Performed by: INTERNAL MEDICINE

## 2024-06-25 PROCEDURE — 3078F DIAST BP <80 MM HG: CPT | Mod: CPTII,,, | Performed by: INTERNAL MEDICINE

## 2024-06-25 PROCEDURE — 1160F RVW MEDS BY RX/DR IN RCRD: CPT | Mod: CPTII,,, | Performed by: INTERNAL MEDICINE

## 2024-06-25 PROCEDURE — 99499 UNLISTED E&M SERVICE: CPT | Mod: ,,, | Performed by: INTERNAL MEDICINE

## 2024-06-25 PROCEDURE — 3044F HG A1C LEVEL LT 7.0%: CPT | Mod: CPTII,,, | Performed by: INTERNAL MEDICINE

## 2024-06-25 PROCEDURE — 1159F MED LIST DOCD IN RCRD: CPT | Mod: CPTII,,, | Performed by: INTERNAL MEDICINE

## 2024-06-25 PROCEDURE — 4010F ACE/ARB THERAPY RXD/TAKEN: CPT | Mod: CPTII,,, | Performed by: INTERNAL MEDICINE

## 2024-06-25 PROCEDURE — 3074F SYST BP LT 130 MM HG: CPT | Mod: CPTII,,, | Performed by: INTERNAL MEDICINE

## 2024-06-25 RX ORDER — LISINOPRIL 10 MG/1
10 TABLET ORAL DAILY
Qty: 90 TABLET | Refills: 3 | Status: SHIPPED | OUTPATIENT
Start: 2024-06-25

## 2024-06-25 RX ORDER — ACYCLOVIR 400 MG/1
400 TABLET ORAL DAILY
Qty: 90 TABLET | Refills: 3 | Status: SHIPPED | OUTPATIENT
Start: 2024-06-25

## 2024-06-25 RX ORDER — DULOXETIN HYDROCHLORIDE 60 MG/1
120 CAPSULE, DELAYED RELEASE ORAL DAILY
Qty: 180 CAPSULE | Refills: 3 | Status: SHIPPED | OUTPATIENT
Start: 2024-06-25

## 2024-06-25 NOTE — PROGRESS NOTES
Internal Medicine    Patient Name:  Rob Dennison   Patient ID: [id number]     Chief Complaint: Employment Physical (Here for us to fill out paperwork for her promotion with current job/)      HPI:     Rob Dennison is a 44 y.o. 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  Past medical history significant for HTN, anxiety, breast cancer s/p mastectomy.  Overall she is doing well.  No acute concerns or issues today.  BP at goal in office today, reports she does not check BP at home.  Employment physical requirements reviewed and discussed with patient.  She will obtain vision portion of physical from eye doctor.    Last AWV: 5/17/24      Past Medical History:   Diagnosis Date    Anemia     Oct. Nov of 2022    Anxiety     Breast cancer in female     right    Depression     Elevated troponin 11/4/2022    Hypertension     Menopause     Hot flashes began before chemo in July        Past Surgical History:   Procedure Laterality Date    ABDOMINAL SURGERY      Gall bladder removal 2021    AXILLARY NODE DISSECTION Right 01/30/2023    Procedure: LYMPHADENECTOMY, AXILLARY;  Surgeon: Genie Josue MD;  Location: [facility] OR;  Service: General;  Laterality: Right;    BACK SURGERY      CHOLECYSTECTOMY      ENDOSCOPIC RELEASE OF BOTH CARPAL TUNNELS Bilateral     SENTINEL LYMPH NODE BIOPSY Right 01/30/2023    Procedure: BIOPSY, LYMPH NODE, SENTINEL Right MagTrace Injected in office SentiMag needed No NUC MED;  Surgeon: Genie Josue MD;  Location: [facility] OR;  Service: General;  Laterality: Right;  MagTrace Injected in office  SentiMag needed  No NUC MED    SIMPLE MASTECTOMY Bilateral 01/30/2023    Procedure: MASTECTOMY, SIMPLE  Bilateral;  Surgeon: Genie Josue MD;  Location: [facility] OR;  Service: General;  Laterality: Bilateral;    SPINE SURGERY      Shaved herniated disc    TOTAL ABDOMINAL HYSTERECTOMY W/ BILATERAL SALPINGOOPHORECTOMY      TRIGGER FINGER RELEASE Right     WISDOM  TOOTH EXTRACTION          Social History     Tobacco Use    Smoking status: Never    Smokeless tobacco: Never   Substance and Sexual Activity    Alcohol use: Yes     Comment: Occasionally    Drug use: Never    Sexual activity: Yes     Partners: Female     Birth control/protection: None        Current Outpatient Medications   Medication Instructions    acyclovir (ZOVIRAX) 400 mg, Oral, Daily    busPIRone (BUSPAR) 5 mg, Oral, 3 times daily PRN    carvediloL (COREG) 3.125 MG tablet SMARTSI Tablet(s) By Mouth Morning-Evening    DULoxetine (CYMBALTA) 120 mg, Oral, Daily    fluticasone propionate (FLONASE) 50 mcg/actuation nasal spray 1 spray, Each Nostril, Daily    LIDOcaine-prilocaine (EMLA) cream Topical (Top), As needed (PRN)    lisinopriL 10 mg, Oral, Daily    loratadine (CLARITIN) 10 mg, Oral, Daily    multivit-iron-FA-calcium-mins 9 mg iron-400 mcg Tab tablet 1 tablet, Oral, Daily    traZODone (DESYREL) 25 mg, Oral, Nightly PRN       Review of patient's allergies indicates:   Allergen Reactions    Fosaprepitant Other (See Comments)     Flushing, nausea, abdominal discomfort    Adhesive     Erythromycin base Other (See Comments) and Rash     Fever        Patient Care Team:  Richa Waller MD as PCP - General (Internal Medicine)  Shala Levin MD (Cardiology)  Aliya Winslow MD as Consulting Physician (Obstetrics and Gynecology)  Marcy Del Valle MD (Internal Medicine)     Subjective:     Review of Systems   Constitutional:  Negative for appetite change, chills, diaphoresis, fatigue and fever.   HENT:  Negative for sinus pain and sore throat.    Eyes:  Negative for visual disturbance.   Respiratory:  Positive for shortness of breath (one recent episodes which she thought was related to anxiety.). Negative for cough and wheezing.    Cardiovascular:  Negative for chest pain, palpitations and leg swelling.   Gastrointestinal:  Negative for abdominal pain, constipation, diarrhea, nausea and vomiting.    Endocrine: Negative for cold intolerance and heat intolerance.   Genitourinary:  Negative for dysuria, frequency and hematuria.   Musculoskeletal:  Negative for arthralgias, back pain, joint swelling and myalgias.   Skin:  Negative for color change and rash.   Allergic/Immunologic: Negative.    Neurological: Negative.  Negative for dizziness, syncope, weakness, light-headedness and numbness.   Hematological: Negative.  Does not bruise/bleed easily.   Psychiatric/Behavioral: Negative.  The patient is not nervous/anxious.    All other systems reviewed and are negative.      Objective:     Visit Vitals  /62 (BP Location: Left arm, Patient Position: Sitting, BP Method: Small (Manual))   Pulse 75   Resp 18   Ht 5' (1.524 m)   Wt 79.4 kg (175 lb)   SpO2 97%   BMI 34.18 kg/m²       Physical Exam  Vitals and nursing note reviewed.   Constitutional:       General: She is not in acute distress.     Appearance: She is obese. She is not ill-appearing.   HENT:      Head: Normocephalic and atraumatic.      Mouth/Throat:      Mouth: Mucous membranes are moist.      Pharynx: Oropharynx is clear.   Eyes:      General: No scleral icterus.     Extraocular Movements: Extraocular movements intact.      Conjunctiva/sclera: Conjunctivae normal.      Pupils: Pupils are equal, round, and reactive to light.   Neck:      Vascular: No carotid bruit.   Cardiovascular:      Rate and Rhythm: Normal rate and regular rhythm.      Heart sounds: Normal heart sounds. No murmur heard.     No friction rub. No gallop.   Pulmonary:      Effort: Pulmonary effort is normal. No respiratory distress.      Breath sounds: Normal breath sounds. No wheezing, rhonchi or rales.   Abdominal:      General: Bowel sounds are normal. There is no distension.      Palpations: Abdomen is soft. There is no mass.      Tenderness: There is no abdominal tenderness.   Musculoskeletal:         General: Normal range of motion.      Cervical back: Normal range of motion  and neck supple.   Skin:     General: Skin is warm and dry.      Capillary Refill: Capillary refill takes less than 2 seconds.   Neurological:      General: No focal deficit present.      Mental Status: She is alert and oriented to person, place, and time.   Psychiatric:         Mood and Affect: Mood normal.         Behavior: Behavior normal.         Labs Reviewed:     Chemistry:  Lab Results   Component Value Date     05/16/2024    K 4.1 05/16/2024    BUN 12.0 05/16/2024    CREATININE 0.91 05/16/2024    EGFRNORACEVR >60 05/16/2024    GLUCOSE 117 (H) 05/16/2024    CALCIUM 9.4 05/16/2024    ALKPHOS 121 05/16/2024    LABPROT 7.4 05/16/2024    ALBUMIN 4.1 05/16/2024    BILIDIR 0.3 02/21/2022    IBILI 0.30 02/21/2022    AST 33 05/16/2024    ALT 41 05/16/2024    TSH 2.380 06/19/2023        Lab Results   Component Value Date    HGBA1C 5.6 05/16/2024        Hematology:  Lab Results   Component Value Date    WBC 4.99 05/16/2024    HGB 13.6 05/16/2024    HCT 39.7 05/16/2024     05/16/2024       Lipid Panel:  Lab Results   Component Value Date    CHOL 214 (H) 05/16/2024    HDL 59 05/16/2024    .00 05/16/2024    TRIG 197 (H) 05/16/2024    TOTALCHOLEST 4 05/16/2024        Urine:  Lab Results   Component Value Date    APPEARANCEUA CLEAR 08/02/2019    PROTEINUA Negative 08/02/2019    LEUKOCYTESUR Negative 08/02/2019    RBCUA NONE SEEN 08/02/2019    WBCUA NONE SEEN 08/02/2019    BACTERIA NONE SEEN 08/02/2019        Assessment:       ICD-10-CM ICD-9-CM   1. Encounter for physical examination related to employment  Z02.89 V70.5        Plan:     1. Encounter for physical examination related to employment  Assessment & Plan:  Employment physical completed and given to patient.  She will have vision and hearing sections completed by specialist.           Follow up for Previously scheduled and PRN if need. In addition to their scheduled follow up, the patient has also been instructed to follow up on as needed basis.      Future Appointments   Date Time Provider Department Center   7/9/2024  9:40 AM Shavon Rizzo PA [facility] [place]    7/15/2024  7:30 AM Tristen Kovacs [facility] [address] [place]   11/18/2024 11:00 AM Richa Waller MD [id number]        Tori Puente, P

## 2024-06-25 NOTE — ASSESSMENT & PLAN NOTE
Employment physical completed and given to patient.  She will have vision and hearing sections completed by specialist.

## 2024-07-09 ENCOUNTER — OFFICE VISIT (OUTPATIENT)
Dept: SURGERY | Facility: CLINIC | Age: 44
End: 2024-07-09
Payer: COMMERCIAL

## 2024-07-09 VITALS
HEIGHT: 60 IN | HEART RATE: 69 BPM | OXYGEN SATURATION: 100 % | DIASTOLIC BLOOD PRESSURE: 79 MMHG | SYSTOLIC BLOOD PRESSURE: 115 MMHG | RESPIRATION RATE: 18 BRPM | BODY MASS INDEX: 34.52 KG/M2 | TEMPERATURE: 98 F | WEIGHT: 175.81 LBS

## 2024-07-09 DIAGNOSIS — Z17.1 MALIGNANT NEOPLASM OF CENTRAL PORTION OF RIGHT BREAST IN FEMALE, ESTROGEN RECEPTOR NEGATIVE: Primary | ICD-10-CM

## 2024-07-09 DIAGNOSIS — C50.911 BREAST CANCER, BRCA1 POSITIVE, RIGHT: ICD-10-CM

## 2024-07-09 DIAGNOSIS — Z90.13 S/P BILATERAL MASTECTOMY: ICD-10-CM

## 2024-07-09 DIAGNOSIS — C50.111 MALIGNANT NEOPLASM OF CENTRAL PORTION OF RIGHT BREAST IN FEMALE, ESTROGEN RECEPTOR NEGATIVE: Primary | ICD-10-CM

## 2024-07-09 DIAGNOSIS — Z15.01 BREAST CANCER, BRCA1 POSITIVE, RIGHT: ICD-10-CM

## 2024-07-09 PROCEDURE — 3044F HG A1C LEVEL LT 7.0%: CPT | Mod: CPTII,S$GLB,, | Performed by: PHYSICIAN ASSISTANT

## 2024-07-09 PROCEDURE — 3074F SYST BP LT 130 MM HG: CPT | Mod: CPTII,S$GLB,, | Performed by: PHYSICIAN ASSISTANT

## 2024-07-09 PROCEDURE — 1160F RVW MEDS BY RX/DR IN RCRD: CPT | Mod: CPTII,S$GLB,, | Performed by: PHYSICIAN ASSISTANT

## 2024-07-09 PROCEDURE — 1159F MED LIST DOCD IN RCRD: CPT | Mod: CPTII,S$GLB,, | Performed by: PHYSICIAN ASSISTANT

## 2024-07-09 PROCEDURE — 99214 OFFICE O/P EST MOD 30 MIN: CPT | Mod: S$GLB,,, | Performed by: PHYSICIAN ASSISTANT

## 2024-07-09 PROCEDURE — 4010F ACE/ARB THERAPY RXD/TAKEN: CPT | Mod: CPTII,S$GLB,, | Performed by: PHYSICIAN ASSISTANT

## 2024-07-09 PROCEDURE — 99999 PR PBB SHADOW E&M-EST. PATIENT-LVL IV: CPT | Mod: PBBFAC,,, | Performed by: PHYSICIAN ASSISTANT

## 2024-07-09 PROCEDURE — 3078F DIAST BP <80 MM HG: CPT | Mod: CPTII,S$GLB,, | Performed by: PHYSICIAN ASSISTANT

## 2024-07-09 PROCEDURE — 3008F BODY MASS INDEX DOCD: CPT | Mod: CPTII,S$GLB,, | Performed by: PHYSICIAN ASSISTANT

## 2024-07-09 NOTE — PROGRESS NOTES
[facility] [place] [facility]  Breast Surgical Oncology  Follow-Up Patient Office Visit       Referring Provider: No ref. provider found  PCP: Richa Waller MD   Care Team: No care team member to display No care team member to display     Chief Complaint:   Chief Complaint   Patient presents with    Physical Exam     6 month BC follow up w/CBE        Subjective:   Treatment History:  1. 2022-Genetic Testing- BRCA 1 mutation  2. Medical Oncology Referral to Dr. Marcy Del Valle  3. 2022 Mediport Placement  4. Neoadjuvant Chemotherapy - AC/Ketruda followed by weekly Taxol x 12 cycles (last dose of Taxol 2022) + Carboplatin/Keytruda (last dose of Keytruda 2022)  5. 2023 Bilateral simple mastectomy and right SLNB - consistent with complete response  6. Adjuvant Ketruda  7. TAHBSO 2024    Interval History:  2024 - Rob Dennison presents today for follow up and breast cancer surveillance. She is doing well. She has no concerns to the bilateral mastectomies and is doing well. In the interval, she underwent risk reducing hysterectomy and oopherectomy. She also underwent screening colonoscopy which was benign. Has upcoming screening skin check with dermatology soon.    HPI:  Rob Dennison is a 42 y.o. female who presents on 2022 for evaluation of newly diagnosed right breast cancer.     A detailed patient history was obtained and reviewed. She currently denies any breast issues including rashes, redness, pain, swelling, nipple discharge, or new lumps/masses. She lives in [place] and previously lived in [place]. Offered referral to breast surgeon in [place] for surveillance for convenience. She prefers to continue follow up care here.      Imagin. 3/9/2022 BL SC MG at [facility] - which revealed a a focal asymmetry in the right breast central to the nipple, posterior depth.    No other significant masses, calcifications, or other findings are seen in either  breast.  BIRADS-0; additional imaging needed.     2. 4/14/2022 R DG MG/ R US BREAST LIMITED at [facility] - which revealed on R MG at central region posterior depth focal asymmetry recalled from screening mammography. On today's additional mammographic views, there are three adjacent oval equal density masses with indistinct margins located in the central region far posterior depth. The most posterior of these three masses correlates with the focal asymmetry recalled from screening. No other significant mammographic finding.  On R US, at 7 o'clock to 9 o'clock and subareolar region revealed three adjacent oval hypoechoic masses with indistinct margins at the 9 o'clock  subareolar position, correlating with the three adjacent oval equal density masses with indistinct margins seen on today's mammogram. These three masses measure 0.5 x 0.5 x 0.4 cm, 0.9 x 0.9 x 0.8 cm, and 0.8 x 0.4 x 0.5 cm.   Incidentally seen at 7 o'clock to 8 o'clock  are several subcentimeter cysts of varying size and complication. These are benign. No suspicious right axillary adenopathy. BIRADS-4 suspicious; biopsy recommended.    3. 6/8/2022 Xray Right Shoulder at [facility] - which revealed no acute osseous abnormality.     4. 6/9/2022 BL BREAST MRI at [facility] - which revealed in R breast, 9 o'clock subareolar right breast, posterior depth, there is a 2.7 x 1.5 x 1.6 cm oval mass with non circumscribed margins which demonstrates rapid, washout kinetics (axial image 333).  There is a signal void consistent with a T3 coil shaped HydroMARK clip within this known malignancy.  In the subareolar right breast, anterior to middle depth, there is a 0.7 x 0.3 x 0.7 cm oval, enhancing mass with circumscribed margins (axial image 335).  This mass is 3.5 cm anterior to the known malignancy and 2.8 cm deep to the nipple.   Inferomedial to the known malignancy, in the lower inner quadrant of the right breast, posterior depth, there are 2 areas of clumped, non mass  enhancement.  The deeper of the 2 areas of clumped, non mass enhancement spans 0.8 cm (axial image 341) and is 1.4 cm anterior to the pectoralis major muscle.  The more superficial area of clumped, non mass enhancement spans 0.7 cm (axial image 342).   Superomedial to the known malignancy, in the upper inner quadrant of the right breast, middle depth, there is linear, non mass enhancement spanning 1.7 cm anterior to posterior (axial image 327).  In total the abnormal enhancement spans 7.6 cm anterior to posterior.  There is no skin thickening or nipple retraction.  No axillary or internal mammary lymphadenopathy is identified.  In the L breast, there is no suspicious areas of enhancement or areas of architectural distortion are seen.  There is no skin thickening or nipple retraction.  No axillary or internal mammary lymphadenopathy is identified.  BIRADS-4 suspicious     Pathology:  1. 5/1/2022 Ultrasound-guided Core Needle Biopsy Right Breast Mass 9 o'clock Subareolar-  -Invasive ductal carcinoma, grade 3 (measuring 5.0mm)  ER 1%  WA 0%  HER2 Negative  Ki67 63%    2. 1/30/2023 Bilateral simple mastectomy and Right sentinel lymph node biopsy - Right mastectomy revealed stromal sclerosis with numerous hemosiderin-laden macrophages, negative for residual carcinoma (complete pathological response - see comment). 6 sentinel lymph nodes negative for metastatic carcinoma.    Comment: This patient's history of a previous core biopsy demonstrating a high grade   invasive ductal carcinoma (ER 1%, WA -, HER2- and Ki-67 63%) is noted. Review of   the electronic medical record reveals that this 27 mm mass was at the 9 o'clock   subareolar position, posterior depth. This region has been extensively sampled,   including 25 additional `gross recuts`.  There is no residual carcinoma. A zone   of sclerosis with numerous hemosiderin-laden macrophages represents the prior   biopsy site. The axillary lymph nodes likewise have abundant  hemosiderin-laden   macrophages in the subcapsular sinusoids. The findings are indicative of a   complete pathologic response to neoadjuvant therapy.      OB/GYN History:  Age at Menarche Onset: 10  Menopausal Status: premenopausal, LMP: 2022   Hysterectomy/Oophorectomy: no,n/a  Hormonal birth control (duration): Yes OCP (estrogen/progesterone).   Pregnancy History:   Age at first live birth: 1  Hormone Replacement Therapy: No, none  Patient admits to nipple discharge. Described as bilateral and clear after having first mammogram which has resolved   Patient denies to previous breast biopsy.   Patient denies to a personal history of breast cancer.  MG breast density: Category C (Heterogeneously dense)     Other Relevant History:  Prior thoracic RT: none  Genetic testing: yes  Ashkenazi Christian descent: No     Family History:  Father - Skin cancer possible melanoma at age 80  Paternal Grandfather - colon cancer at age 70     Past History:  Past Medical History:   Diagnosis Date    Anemia     Oct. Nov of 2022    Anxiety     Breast cancer in female     right    Depression     Elevated troponin 2022    Hypertension     Menopause     Hot flashes began before chemo in July        Past Surgical History:   Procedure Laterality Date    ABDOMINAL SURGERY      Gall bladder removal     AXILLARY NODE DISSECTION Right 2023    Procedure: LYMPHADENECTOMY, AXILLARY;  Surgeon: Genie Josue MD;  Location: [facility] OR;  Service: General;  Laterality: Right;    BACK SURGERY      CHOLECYSTECTOMY      ENDOSCOPIC RELEASE OF BOTH CARPAL TUNNELS Bilateral     SENTINEL LYMPH NODE BIOPSY Right 2023    Procedure: BIOPSY, LYMPH NODE, SENTINEL Right MagTrace Injected in office SentiMag needed No NUC MED;  Surgeon: Genie Josue MD;  Location: [facility] OR;  Service: General;  Laterality: Right;  MagTrace Injected in office  SentiMag needed  No NUC MED    SIMPLE MASTECTOMY Bilateral 2023    Procedure: MASTECTOMY,  SIMPLE  Bilateral;  Surgeon: Genie Josue MD;  Location: [facility];  Service: General;  Laterality: Bilateral;    SPINE SURGERY      Shaved herniated disc    TOTAL ABDOMINAL HYSTERECTOMY W/ BILATERAL SALPINGOOPHORECTOMY      TRIGGER FINGER RELEASE Right     WISDOM TOOTH EXTRACTION          Social History     Socioeconomic History    Marital status:    Tobacco Use    Smoking status: Never    Smokeless tobacco: Never   Substance and Sexual Activity    Alcohol use: Yes     Comment: Occasionally    Drug use: Never    Sexual activity: Yes     Partners: Female     Birth control/protection: None        Body mass index is 34.33 kg/m².     Allergy/Medications:   Review of patient's allergies indicates:   Allergen Reactions    Fosaprepitant Other (See Comments)     Flushing, nausea, abdominal discomfort    Adhesive     Erythromycin base Other (See Comments) and Rash     Fever          Current Outpatient Medications:     acyclovir (ZOVIRAX) 400 MG tablet, Take 1 tablet (400 mg total) by mouth once daily., Disp: 90 tablet, Rfl: 3    busPIRone (BUSPAR) 5 MG Tab, Take 1 tablet (5 mg total) by mouth 3 (three) times daily as needed (anxiety)., Disp: 60 tablet, Rfl: 11    carvediloL (COREG) 3.125 MG tablet, SMARTSI Tablet(s) By Mouth Morning-Evening, Disp: , Rfl:     DULoxetine (CYMBALTA) 60 MG capsule, Take 2 capsules (120 mg total) by mouth once daily., Disp: 180 capsule, Rfl: 3    fluticasone propionate (FLONASE) 50 mcg/actuation nasal spray, 1 spray by Each Nostril route once daily., Disp: , Rfl:     LIDOcaine-prilocaine (EMLA) cream, Apply topically as needed., Disp: , Rfl:     lisinopriL 10 MG tablet, Take 1 tablet (10 mg total) by mouth once daily., Disp: 90 tablet, Rfl: 3    loratadine (CLARITIN) 10 mg tablet, Take 10 mg by mouth once daily., Disp: , Rfl:     multivit-iron-FA-calcium-mins 9 mg iron-400 mcg Tab tablet, Take 1 tablet by mouth once daily., Disp: , Rfl:     traZODone (DESYREL) 50 MG tablet, Take  0.5 tablets (25 mg total) by mouth nightly as needed for Insomnia., Disp: 30 tablet, Rfl: 11  No current facility-administered medications for this visit.    Facility-Administered Medications Ordered in Other Visits:     scopolamine 1.3-1.5 mg (1 mg over 3 days) 1 patch, 1 patch, Transdermal, Q3 Days, Sridhar Mao MD, 1 patch at 07/01/22 0624       Review of Systems:  Constitutional: denies fevers, chills, weight loss  HEENT: denies blurry/double vision, changes in hearing, odynophagia, dysphagia  Respiratory: denies cough, shortness of breath  Cardiovascular: denies palpitations, swelling of the extremities  GI: denies abdominal pain, nausea/vomiting, hematochezia, frequent stools  : denies frequency, dysuria, flank pain, hematuria  Skin: denies new rashes  Neurological: denies muscular/sensory deficiencies, loss of coordination, headaches, memory changes  Endo: denies hair loss/thinning, nervousness, hot flashes, heat/cold intolerance, lumps in the neck area  Heme: denies easy bruising and fatigue  Psychological: denies anxious/depressive moods  Musculoskeletal: denies bony pain, muscle cramps, swollen joints    Objective:     Vitals:  Blood pressure 115/79, pulse 69, temperature 98 °F (36.7 °C), temperature source Oral, resp. rate 18, height 5' (1.524 m), weight 79.7 kg (175 lb 12.8 oz), last menstrual period 05/31/2022, SpO2 100%.    Physical Exam:  General: The patient is awake, alert and oriented times three. The patient is well nourished and in no acute distress.    Musculoskeletal: The patient has a normal range of motion of her bilateral upper extremities.    Breast:   Examination of the Mastectomy sites bilaterally fails to reveal any dominant masses or areas of significant focal nodularity. The nipples are surgically absent bilaterally. There are no significant skin changes overlying the breasts. There is no skin dimpling with movement of the pectoralis.   Integumentary: no rashes or skin lesions  "present  Neurologic: cranial nerves intact, no signs of peripheral neurological deficit, motor/sensory function intact      Assessment and Plan:     Encounter Diagnoses   Name Primary?    Malignant neoplasm of central portion of right breast in female, estrogen receptor negative Yes    Breast cancer, BRCA1 positive, right     S/P bilateral mastectomy                   Adarelis "Beatrice" was seen today for physical exam.    Diagnoses and all orders for this visit:    Malignant neoplasm of central portion of right breast in female, estrogen receptor negative    Breast cancer, BRCA1 positive, right    S/P bilateral mastectomy             Plan:       RTC in 6 months for CBE.  Will also schedule for same day port removal which can be done in our office with local anesthesia.  Plan to call patient with sooner appointment when they become available.    Follow up with medical oncology.    BRCA mutation - S/p preventative hysterotomy and oophorectomy and s/p BL simple mastectomy.  Continue screening for melanoma with dermatology.    Colonoscopy with Dr. Michael Wright was benign. Next screening colonoscopy recommended 2029.    Healthy lifestyle guidelines were reviewed. She was encouraged to engage in regular exercise, maintain a healthy body weight, and avoid excessive alcohol consumption. Healthy nutritional guidelines were also discussed. Self-breast examination was reviewed with the patient in detail and she was encouraged to perform this on a monthly basis.         All of her questions were answered. She was advised to call if she develops any questions or concerns.    Shavon Rizzo PA-C      Total time on the date of the visit ranged from 30-39 mins ([id number]). Total time includes both face-to-face and non-face-to-face time personally spent by myself on the day of the visit.    Non-face-to-face time included:  _X_ preparing to see the patient such as reviewing the patient record  __ obtaining and reviewing separately " obtained history  _X_ independently interpreting results  _X_ documenting clinical information in electronic health record.    Face-to-face time included:  _X_ performing an appropriate history and examination  _X_ communicating results to the patient  _X_ counseling and educating the patient  __ ordering appropriate medications  __ ordering appropriate tests  _X_ ordering appropriate procedures (including follow-up)  _X_ answering any questions the patient had    Total Time spent on date of visit: 35 minutes

## 2024-07-15 ENCOUNTER — OFFICE VISIT (OUTPATIENT)
Dept: NEUROLOGY | Facility: CLINIC | Age: 44
End: 2024-07-15
Payer: COMMERCIAL

## 2024-07-15 VITALS
SYSTOLIC BLOOD PRESSURE: 122 MMHG | WEIGHT: 175 LBS | BODY MASS INDEX: 34.36 KG/M2 | DIASTOLIC BLOOD PRESSURE: 74 MMHG | HEIGHT: 60 IN

## 2024-07-15 DIAGNOSIS — G47.19 EXCESSIVE DAYTIME SLEEPINESS: Primary | ICD-10-CM

## 2024-07-15 DIAGNOSIS — G47.33 OSA (OBSTRUCTIVE SLEEP APNEA): ICD-10-CM

## 2024-07-15 PROCEDURE — 99214 OFFICE O/P EST MOD 30 MIN: CPT | Mod: S$GLB,,, | Performed by: NURSE PRACTITIONER

## 2024-07-15 PROCEDURE — 4010F ACE/ARB THERAPY RXD/TAKEN: CPT | Mod: CPTII,S$GLB,, | Performed by: NURSE PRACTITIONER

## 2024-07-15 PROCEDURE — 3078F DIAST BP <80 MM HG: CPT | Mod: CPTII,S$GLB,, | Performed by: NURSE PRACTITIONER

## 2024-07-15 PROCEDURE — 1159F MED LIST DOCD IN RCRD: CPT | Mod: CPTII,S$GLB,, | Performed by: NURSE PRACTITIONER

## 2024-07-15 PROCEDURE — 3044F HG A1C LEVEL LT 7.0%: CPT | Mod: CPTII,S$GLB,, | Performed by: NURSE PRACTITIONER

## 2024-07-15 PROCEDURE — 99999 PR PBB SHADOW E&M-EST. PATIENT-LVL III: CPT | Mod: PBBFAC,,, | Performed by: NURSE PRACTITIONER

## 2024-07-15 PROCEDURE — 3008F BODY MASS INDEX DOCD: CPT | Mod: CPTII,S$GLB,, | Performed by: NURSE PRACTITIONER

## 2024-07-15 PROCEDURE — 3074F SYST BP LT 130 MM HG: CPT | Mod: CPTII,S$GLB,, | Performed by: NURSE PRACTITIONER

## 2024-07-15 PROCEDURE — 1160F RVW MEDS BY RX/DR IN RCRD: CPT | Mod: CPTII,S$GLB,, | Performed by: NURSE PRACTITIONER

## 2024-07-15 NOTE — PROGRESS NOTES
Neurology Note - Sleep follow up    Subjective:         Patient ID: Rob Dennison is a 44 y.o. female.    Chief Complaint: F/U EUGENE.     HPI:            Wears CPAP qhs and is tolerating it well.     Sleeps better w CPAP. Sleeps 7 hrs a night and awakens 1 time for no reason and is able to go right back to sleep. Awakens refreshed but has EDS.     Does not nap.     DME-[facility]    Compliance   06/14/2024-07/13/2024  AHI-7    > 4 hrs 73%  Autopap 5-20    ROS: as per HPI, otherwise pertinent systems review is negative              7/15/2024     7:47 AM   EPWORTH SLEEPINESS SCALE   Sitting and reading 3   Watching TV 3   Sitting, inactive in a public place (e.g. a theatre or a meeting) 1   As a passenger in a car for an hour without a break 3   Lying down to rest in the afternoon when circumstances permit 3   Sitting and talking to someone 0   Sitting quietly after a lunch without alcohol 2   In a car, while stopped for a few minutes in traffic 0   Total score 15        Past Medical History:   Diagnosis Date    Anemia     Oct. Nov of 2022    Anxiety     Breast cancer in female     right    Depression     Elevated troponin 11/4/2022    Hypertension     Menopause     Hot flashes began before chemo in July       Past Surgical History:   Procedure Laterality Date    ABDOMINAL SURGERY      Gall bladder removal 2021    AXILLARY NODE DISSECTION Right 01/30/2023    Procedure: LYMPHADENECTOMY, AXILLARY;  Surgeon: Genie Josue MD;  Location: [facility];  Service: General;  Laterality: Right;    BACK SURGERY      CHOLECYSTECTOMY      ENDOSCOPIC RELEASE OF BOTH CARPAL TUNNELS Bilateral     SENTINEL LYMPH NODE BIOPSY Right 01/30/2023    Procedure: BIOPSY, LYMPH NODE, SENTINEL Right MagTrace Injected in office SentiMag needed No NUC MED;  Surgeon: Genie Josue MD;  Location: [facility] OR;  Service: General;  Laterality: Right;  MagTrace Injected in office  SentiMag needed  No NUC MED    SIMPLE MASTECTOMY Bilateral 01/30/2023     Procedure: MASTECTOMY, SIMPLE  Bilateral;  Surgeon: Genie Josue MD;  Location: [facility] OR;  Service: General;  Laterality: Bilateral;    SPINE SURGERY      Shaved herniated disc    TOTAL ABDOMINAL HYSTERECTOMY W/ BILATERAL SALPINGOOPHORECTOMY      TRIGGER FINGER RELEASE Right     WISDOM TOOTH EXTRACTION         Family History   Problem Relation Name Age of Onset    Hypertension Mother Honey     Hyperlipidemia Mother Honey     Arthritis Mother Honey     Hyperlipidemia Father Claudy     Stroke Father Claudy     Skin cancer Father Claudy     Cataracts Father Claudy     Alcohol abuse Father Claudy     Colon cancer Paternal Grandfather         Social History     Socioeconomic History    Marital status:    Tobacco Use    Smoking status: Never    Smokeless tobacco: Never   Substance and Sexual Activity    Alcohol use: Yes     Comment: Occasionally    Drug use: Never    Sexual activity: Yes     Partners: Female     Birth control/protection: None       Review of patient's allergies indicates:   Allergen Reactions    Fosaprepitant Other (See Comments)     Flushing, nausea, abdominal discomfort    Adhesive     Erythromycin base Other (See Comments) and Rash     Fever         Current Outpatient Medications:     acyclovir (ZOVIRAX) 400 MG tablet, Take 1 tablet (400 mg total) by mouth once daily., Disp: 90 tablet, Rfl: 3    busPIRone (BUSPAR) 5 MG Tab, Take 1 tablet (5 mg total) by mouth 3 (three) times daily as needed (anxiety)., Disp: 60 tablet, Rfl: 11    carvediloL (COREG) 3.125 MG tablet, SMARTSI Tablet(s) By Mouth Morning-Evening, Disp: , Rfl:     DULoxetine (CYMBALTA) 60 MG capsule, Take 2 capsules (120 mg total) by mouth once daily., Disp: 180 capsule, Rfl: 3    fluticasone propionate (FLONASE) 50 mcg/actuation nasal spray, 1 spray by Each Nostril route once daily., Disp: , Rfl:     lisinopriL 10 MG tablet, Take 1 tablet (10 mg total) by mouth once daily., Disp: 90 tablet, Rfl: 3    loratadine (CLARITIN) 10 mg tablet,  Take 10 mg by mouth once daily., Disp: , Rfl:     multivit-iron-FA-calcium-mins 9 mg iron-400 mcg Tab tablet, Take 1 tablet by mouth once daily., Disp: , Rfl:     traZODone (DESYREL) 50 MG tablet, Take 0.5 tablets (25 mg total) by mouth nightly as needed for Insomnia., Disp: 30 tablet, Rfl: 11    LIDOcaine-prilocaine (EMLA) cream, Apply topically as needed. (Patient not taking: Reported on 7/15/2024), Disp: , Rfl:   No current facility-administered medications for this visit.    Facility-Administered Medications Ordered in Other Visits:     scopolamine 1.3-1.5 mg (1 mg over 3 days) 1 patch, 1 patch, Transdermal, Q3 Days, Sridhar Mao MD, 1 patch at 07/01/22 0624     Objective:      Exam:   /74   Ht 5' (1.524 m)   Wt 79.4 kg (175 lb)   LMP 05/31/2022   BMI 34.18 kg/m²     Physical Exam  Vitals reviewed.   Constitutional:       Appearance: Normal appearance.      Accompanied by: alone   HENT:      Ears:      Comments: Hearing normal.  Eyes:      Extraocular Movements: Extraocular movements intact.      VF's ok  Cardiovascular:      Rate and Rhythm: Normal rate and regular rhythm.   Pulmonary:      Effort: Pulmonary effort is normal.      Breath sounds: Normal breath sounds.   Musculoskeletal:         General: Normal range of motion.   Skin:     General: Skin is warm and dry.   Neurological:      General: No focal deficit present.      Mental Status: alert and oriented to person, place, and time.      Speech: nml     Face: symmetric; tongue midline     Motor: nonlateralizing     Gait: unassisted and normal.  Psychiatric:         Mood and Affect: Mood normal.         Behavior: Behavior normal.         Assessment/Plan:     Problem List Items Addressed This Visit          Other    EUGENE (obstructive sleep apnea) - Primary    Excessive daytime sleepiness    Encouraged continued use of PAP. Drowsy driving may still occur despite PAP use. Clinical follow up and replacement of supplies discussed.    She agrees  to proceed with OPO while utilizing autopap  Considered and may need PAP titration (bipap?)    Considered Modafinil - held off for now [she ask that we wait until objective testing completed]    Follow up 6 months              Tristen Kovacs, MSN, APRN, AGACNP-BC

## 2024-08-19 NOTE — PROGRESS NOTES
Internal Medicine    Patient Name:  Rob Dennison   Patient ID: [id number]     Chief Complaint: Abdominal Pain      HPI:     Rob Dennison is a 44 y.o. female, known to Dr Waller, is here today for requested visit due to abdominal pain.  She reports right lower quadrant abdominal pain for the past couple of months.  Denies fever, chills, nausea, vomiting, or diarrhea.  RLQ pain is intermittent and stabbing.  States pain is worse when she has increased flatulence.  She has tried OTC Gas-X with minimal improvement.  States RLQ pain was only relieved with relieving flatulence.  Had colonoscopy on 06/13/2024 with polyp noted in the sigmoid colon.  She reports RLQ discomfort started sometime after colonoscopy.  Had a hysterectomy at the end of 2023.  GYN gave her recommendations relating to vaginal dryness.  She does report new sexual partner.    Also has complaints of left foot near the heel.  X-ray completed in April showed no fracture, but does have small bone spur.    Last AWV:  05/17/2024      Past Medical History:   Diagnosis Date    Anemia     Oct. Nov of 2022    Anxiety     Breast cancer in female     right    Depression     Elevated troponin 11/4/2022    Hypertension     Menopause     Hot flashes began before chemo in July        Past Surgical History:   Procedure Laterality Date    ABDOMINAL SURGERY      Gall bladder removal 2021    AXILLARY NODE DISSECTION Right 01/30/2023    Procedure: LYMPHADENECTOMY, AXILLARY;  Surgeon: Genie Josue MD;  Location: [facility];  Service: General;  Laterality: Right;    BACK SURGERY      CHOLECYSTECTOMY      ENDOSCOPIC RELEASE OF BOTH CARPAL TUNNELS Bilateral     SENTINEL LYMPH NODE BIOPSY Right 01/30/2023    Procedure: BIOPSY, LYMPH NODE, SENTINEL Right MagTrace Injected in office SentiMag needed No NUC MED;  Surgeon: Genie Josue MD;  Location: [facility] OR;  Service: General;  Laterality: Right;  MagTrace Injected in office  SentiMag needed  No NUC MED    SIMPLE MASTECTOMY  Bilateral 2023    Procedure: MASTECTOMY, SIMPLE  Bilateral;  Surgeon: Genie Josue MD;  Location: [facility] OR;  Service: General;  Laterality: Bilateral;    SPINE SURGERY      Shaved herniated disc    TOTAL ABDOMINAL HYSTERECTOMY W/ BILATERAL SALPINGOOPHORECTOMY      TRIGGER FINGER RELEASE Right     WISDOM TOOTH EXTRACTION          Social History     Tobacco Use    Smoking status: Never    Smokeless tobacco: Never   Substance and Sexual Activity    Alcohol use: Yes     Comment: Occasionally    Drug use: Never    Sexual activity: Yes     Partners: Female     Birth control/protection: None        Current Outpatient Medications   Medication Instructions    acyclovir (ZOVIRAX) 400 mg, Oral, Daily    busPIRone (BUSPAR) 5 mg, Oral, 3 times daily PRN    carvediloL (COREG) 3.125 MG tablet SMARTSI Tablet(s) By Mouth Morning-Evening    DULoxetine (CYMBALTA) 120 mg, Oral, Daily    fluticasone propionate (FLONASE) 50 mcg/actuation nasal spray 1 spray, Each Nostril, Daily    LIDOcaine-prilocaine (EMLA) cream Topical (Top), As needed (PRN)    lisinopriL 10 mg, Oral, Daily    loratadine (CLARITIN) 10 mg, Oral, Daily    multivit-iron-FA-calcium-mins 9 mg iron-400 mcg Tab tablet 1 tablet, Oral, Daily    traZODone (DESYREL) 25 mg, Oral, Nightly PRN       Review of patient's allergies indicates:   Allergen Reactions    Fosaprepitant Other (See Comments)     Flushing, nausea, abdominal discomfort    Adhesive     Erythromycin base Other (See Comments) and Rash     Fever        Patient Care Team:  Richa Waller MD as PCP - General (Internal Medicine)  Shala Levin MD (Cardiology)  Aliya Winslow MD as Consulting Physician (Obstetrics and Gynecology)  Marcy Del Valle MD (Internal Medicine)  Shavon Rizzo PA as Physician Assistant (General Surgery)     Subjective:     Review of Systems   Constitutional:  Negative for appetite change, chills, diaphoresis and fever.   HENT:  Negative for ear pain, sinus pain and  sore throat.    Eyes:  Negative for pain and visual disturbance.   Respiratory:  Negative for cough, shortness of breath and wheezing.    Cardiovascular:  Negative for chest pain, palpitations and leg swelling.   Gastrointestinal:  Positive for abdominal pain (Intermittent right lower quadrant pain) and constipation (occasional). Negative for blood in stool, diarrhea, nausea and vomiting.   Endocrine: Negative for cold intolerance.   Genitourinary:  Negative for difficulty urinating, dysuria, frequency and hematuria.   Musculoskeletal:  Negative for arthralgias, joint swelling and myalgias.   Skin:  Negative for color change and rash.   Allergic/Immunologic: Negative.    Neurological: Negative.  Negative for dizziness, syncope, light-headedness and numbness.   Hematological: Negative.    Psychiatric/Behavioral: Negative.  Negative for dysphoric mood and suicidal ideas. The patient is not nervous/anxious.    All other systems reviewed and are negative.      Objective:     Visit Vitals  /78 (BP Location: Left arm, Patient Position: Sitting, BP Method: Medium (Manual))   Pulse 93   Ht 5' (1.524 m)   Wt 80 kg (176 lb 6.4 oz)   LMP 05/31/2022   SpO2 99%   BMI 34.45 kg/m²       Physical Exam  Vitals and nursing note reviewed.   Constitutional:       General: She is not in acute distress.     Appearance: She is not ill-appearing.   HENT:      Head: Normocephalic and atraumatic.      Mouth/Throat:      Mouth: Mucous membranes are moist.      Pharynx: Oropharynx is clear.   Eyes:      General: No scleral icterus.     Extraocular Movements: Extraocular movements intact.      Conjunctiva/sclera: Conjunctivae normal.      Pupils: Pupils are equal, round, and reactive to light.   Neck:      Vascular: No carotid bruit.   Cardiovascular:      Rate and Rhythm: Normal rate and regular rhythm.      Pulses:           Dorsalis pedis pulses are 2+ on the right side and 2+ on the left side.      Heart sounds: Normal heart sounds.  No murmur heard.     No friction rub. No gallop.   Pulmonary:      Effort: Pulmonary effort is normal. No respiratory distress.      Breath sounds: Normal breath sounds. No wheezing, rhonchi or rales.   Abdominal:      General: Bowel sounds are normal. There is no distension.      Palpations: Abdomen is soft. There is no mass.      Tenderness: There is no abdominal tenderness. There is no guarding or rebound.       Musculoskeletal:         General: Normal range of motion.      Cervical back: Normal range of motion and neck supple.        Feet:    Feet:      Right foot:      Skin integrity: Skin integrity normal.      Toenail Condition: Right toenails are normal.      Left foot:      Skin integrity: Skin integrity normal.      Toenail Condition: Left toenails are normal.   Skin:     General: Skin is warm and dry.      Capillary Refill: Capillary refill takes less than 2 seconds.   Neurological:      General: No focal deficit present.      Mental Status: She is alert and oriented to person, place, and time.   Psychiatric:         Mood and Affect: Mood normal.         Behavior: Behavior normal.         Labs Reviewed:     Chemistry:  Lab Results   Component Value Date     08/20/2024    K 4.1 08/20/2024    BUN 7.7 08/20/2024    CREATININE 0.82 08/20/2024    EGFRNORACEVR >60 08/20/2024    GLUCOSE 130 (H) 08/20/2024    CALCIUM 9.5 08/20/2024    ALKPHOS 121 08/20/2024    LABPROT 7.5 08/20/2024    ALBUMIN 3.8 08/20/2024    BILIDIR 0.3 02/21/2022    IBILI 0.30 02/21/2022    AST 20 08/20/2024    ALT 24 08/20/2024    TSH 2.380 06/19/2023        Lab Results   Component Value Date    HGBA1C 5.6 05/16/2024        Hematology:  Lab Results   Component Value Date    WBC 6.21 08/20/2024    HGB 13.0 08/20/2024    HCT 38.5 08/20/2024     08/20/2024       Lipid Panel:  Lab Results   Component Value Date    CHOL 214 (H) 05/16/2024    HDL 59 05/16/2024    .00 05/16/2024    TRIG 197 (H) 05/16/2024    TOTALCHOLEST 4  05/16/2024        Urine:  Lab Results   Component Value Date    APPEARANCEUA Clear 08/20/2024    SGUA 1.021 08/20/2024    PROTEINUA Negative 08/20/2024    KETONESUA Negative 08/20/2024    LEUKOCYTESUR 75 (A) 08/20/2024    RBCUA 0-5 08/20/2024    WBCUA 6-10 (A) 08/20/2024    BACTERIA None Seen 08/20/2024    SQEPUA Trace 08/20/2024        Assessment:       ICD-10-CM ICD-9-CM   1. Chronic RLQ pain  R10.31 789.03    G89.29 338.29   2. Plantar fasciitis of left foot  M72.2 728.71        Plan:     1. Chronic RLQ pain  Assessment & Plan:  Will initiate evaluation with CBC, CMP, UA, and CT abdomen pelvis.    Orders:  -     CBC Auto Differential; Future; Expected date: 08/20/2024  -     Comprehensive Metabolic Panel; Future; Expected date: 08/20/2024  -     Urinalysis, Reflex to Urine Culture; Future; Expected date: 08/20/2024  -     Cancel: US Abdomen Complete; Future; Expected date: 08/20/2024  -     CT Abdomen Pelvis  Without Contrast; Future; Expected date: 08/20/2024    2. Plantar fasciitis of left foot  Assessment & Plan:  Given handout of exercises to relieve plantar fasciitis.    Encouraged to wear supportive shoes.  If symptoms persist or worsen, could consider referral to Podiatry for further evaluation.             Follow up for Previously scheduled and PRN if need. In addition to their scheduled follow up, the patient has also been instructed to follow up on as needed basis.       Future Appointments   Date Time Provider Department Center   8/20/2024  1:15 PM LAB, [facility]   11/18/2024 11:00 AM Richa Waller MD [facility] [id number] [facility] [place]   1/14/2025 10:20 AM Shavon Rizzo PA [facility]   1/14/2025 11:00 AM [facility]   1/21/2025  8:00 AM Tristen Kovacs AGACNP[facility] [address]

## 2024-08-20 ENCOUNTER — TELEPHONE (OUTPATIENT)
Dept: INTERNAL MEDICINE | Facility: CLINIC | Age: 44
End: 2024-08-20
Payer: COMMERCIAL

## 2024-08-20 ENCOUNTER — OFFICE VISIT (OUTPATIENT)
Dept: INTERNAL MEDICINE | Facility: CLINIC | Age: 44
End: 2024-08-20
Payer: COMMERCIAL

## 2024-08-20 ENCOUNTER — LAB VISIT (OUTPATIENT)
Dept: LAB | Facility: HOSPITAL | Age: 44
End: 2024-08-20
Attending: NURSE PRACTITIONER
Payer: COMMERCIAL

## 2024-08-20 VITALS
DIASTOLIC BLOOD PRESSURE: 78 MMHG | WEIGHT: 176.38 LBS | HEART RATE: 93 BPM | BODY MASS INDEX: 34.63 KG/M2 | HEIGHT: 60 IN | OXYGEN SATURATION: 99 % | SYSTOLIC BLOOD PRESSURE: 118 MMHG

## 2024-08-20 DIAGNOSIS — G89.29 CHRONIC RLQ PAIN: ICD-10-CM

## 2024-08-20 DIAGNOSIS — G89.29 CHRONIC RLQ PAIN: Primary | ICD-10-CM

## 2024-08-20 DIAGNOSIS — M72.2 PLANTAR FASCIITIS OF LEFT FOOT: ICD-10-CM

## 2024-08-20 DIAGNOSIS — R10.31 CHRONIC RLQ PAIN: Primary | ICD-10-CM

## 2024-08-20 DIAGNOSIS — R10.31 CHRONIC RLQ PAIN: ICD-10-CM

## 2024-08-20 LAB
ALBUMIN SERPL-MCNC: 3.8 G/DL (ref 3.5–5)
ALBUMIN/GLOB SERPL: 1 RATIO (ref 1.1–2)
ALP SERPL-CCNC: 121 UNIT/L (ref 40–150)
ALT SERPL-CCNC: 24 UNIT/L (ref 0–55)
ANION GAP SERPL CALC-SCNC: 6 MEQ/L
AST SERPL-CCNC: 20 UNIT/L (ref 5–34)
BACTERIA #/AREA URNS AUTO: ABNORMAL /HPF
BASOPHILS # BLD AUTO: 0.03 X10(3)/MCL
BASOPHILS NFR BLD AUTO: 0.5 %
BILIRUB SERPL-MCNC: 0.3 MG/DL
BILIRUB UR QL STRIP.AUTO: NEGATIVE
BUN SERPL-MCNC: 7.7 MG/DL (ref 7–18.7)
CALCIUM SERPL-MCNC: 9.5 MG/DL (ref 8.4–10.2)
CHLORIDE SERPL-SCNC: 104 MMOL/L (ref 98–107)
CLARITY UR: CLEAR
CO2 SERPL-SCNC: 28 MMOL/L (ref 22–29)
COLOR UR AUTO: ABNORMAL
CREAT SERPL-MCNC: 0.82 MG/DL (ref 0.55–1.02)
CREAT/UREA NIT SERPL: 9
EOSINOPHIL # BLD AUTO: 0.08 X10(3)/MCL (ref 0–0.9)
EOSINOPHIL NFR BLD AUTO: 1.3 %
ERYTHROCYTE [DISTWIDTH] IN BLOOD BY AUTOMATED COUNT: 12.9 % (ref 11.5–17)
GFR SERPLBLD CREATININE-BSD FMLA CKD-EPI: >60 ML/MIN/1.73/M2
GLOBULIN SER-MCNC: 3.7 GM/DL (ref 2.4–3.5)
GLUCOSE SERPL-MCNC: 130 MG/DL (ref 74–100)
GLUCOSE UR QL STRIP: NORMAL
HCT VFR BLD AUTO: 38.5 % (ref 37–47)
HGB BLD-MCNC: 13 G/DL (ref 12–16)
HGB UR QL STRIP: NEGATIVE
IMM GRANULOCYTES # BLD AUTO: 0.01 X10(3)/MCL (ref 0–0.04)
IMM GRANULOCYTES NFR BLD AUTO: 0.2 %
KETONES UR QL STRIP: NEGATIVE
LEUKOCYTE ESTERASE UR QL STRIP: 75
LYMPHOCYTES # BLD AUTO: 1.81 X10(3)/MCL (ref 0.6–4.6)
LYMPHOCYTES NFR BLD AUTO: 29.1 %
MCH RBC QN AUTO: 30.2 PG (ref 27–31)
MCHC RBC AUTO-ENTMCNC: 33.8 G/DL (ref 33–36)
MCV RBC AUTO: 89.5 FL (ref 80–94)
MONOCYTES # BLD AUTO: 0.74 X10(3)/MCL (ref 0.1–1.3)
MONOCYTES NFR BLD AUTO: 11.9 %
MUCOUS THREADS URNS QL MICRO: ABNORMAL /LPF
NEUTROPHILS # BLD AUTO: 3.54 X10(3)/MCL (ref 2.1–9.2)
NEUTROPHILS NFR BLD AUTO: 57 %
NITRITE UR QL STRIP: NEGATIVE
NRBC BLD AUTO-RTO: 0 %
PH UR STRIP: 5.5 [PH]
PLATELET # BLD AUTO: 311 X10(3)/MCL (ref 130–400)
PMV BLD AUTO: 9.5 FL (ref 7.4–10.4)
POTASSIUM SERPL-SCNC: 4.1 MMOL/L (ref 3.5–5.1)
PROT SERPL-MCNC: 7.5 GM/DL (ref 6.4–8.3)
PROT UR QL STRIP: NEGATIVE
RBC # BLD AUTO: 4.3 X10(6)/MCL (ref 4.2–5.4)
RBC #/AREA URNS AUTO: ABNORMAL /HPF
SODIUM SERPL-SCNC: 138 MMOL/L (ref 136–145)
SP GR UR STRIP.AUTO: 1.02 (ref 1–1.03)
SQUAMOUS #/AREA URNS LPF: ABNORMAL /HPF
UROBILINOGEN UR STRIP-ACNC: NORMAL
WBC # BLD AUTO: 6.21 X10(3)/MCL (ref 4.5–11.5)
WBC #/AREA URNS AUTO: ABNORMAL /HPF

## 2024-08-20 PROCEDURE — 80053 COMPREHEN METABOLIC PANEL: CPT

## 2024-08-20 PROCEDURE — 3044F HG A1C LEVEL LT 7.0%: CPT | Mod: CPTII,,, | Performed by: NURSE PRACTITIONER

## 2024-08-20 PROCEDURE — 85025 COMPLETE CBC W/AUTO DIFF WBC: CPT

## 2024-08-20 PROCEDURE — 99214 OFFICE O/P EST MOD 30 MIN: CPT | Mod: ,,, | Performed by: NURSE PRACTITIONER

## 2024-08-20 PROCEDURE — 3008F BODY MASS INDEX DOCD: CPT | Mod: CPTII,,, | Performed by: NURSE PRACTITIONER

## 2024-08-20 PROCEDURE — 4010F ACE/ARB THERAPY RXD/TAKEN: CPT | Mod: CPTII,,, | Performed by: NURSE PRACTITIONER

## 2024-08-20 PROCEDURE — 3074F SYST BP LT 130 MM HG: CPT | Mod: CPTII,,, | Performed by: NURSE PRACTITIONER

## 2024-08-20 PROCEDURE — 1160F RVW MEDS BY RX/DR IN RCRD: CPT | Mod: CPTII,,, | Performed by: NURSE PRACTITIONER

## 2024-08-20 PROCEDURE — 81001 URINALYSIS AUTO W/SCOPE: CPT

## 2024-08-20 PROCEDURE — 36415 COLL VENOUS BLD VENIPUNCTURE: CPT

## 2024-08-20 PROCEDURE — 1159F MED LIST DOCD IN RCRD: CPT | Mod: CPTII,,, | Performed by: NURSE PRACTITIONER

## 2024-08-20 PROCEDURE — 3078F DIAST BP <80 MM HG: CPT | Mod: CPTII,,, | Performed by: NURSE PRACTITIONER

## 2024-08-20 NOTE — TELEPHONE ENCOUNTER
----- Message from Tami Senegal sent at 8/20/2024 10:56 AM CDT -----  .Type:  Needs Medical Advice    Who Called:  pt    Symptoms (please be specific):  no     How long has patient had these symptoms:   no    Pharmacy name and phone #:   no    Would the patient rather a call back or a response via [facility]?      Best Call Back Number:  [phone]    Additional Information:  pt states the CT MACHINE is down at [facility] until September pt is asking if she can do the ultrasound if so please advise thanks

## 2024-08-20 NOTE — ASSESSMENT & PLAN NOTE
Given handout of exercises to relieve plantar fasciitis.    Encouraged to wear supportive shoes.  If symptoms persist or worsen, could consider referral to Podiatry for further evaluation.

## 2024-09-27 ENCOUNTER — APPOINTMENT (OUTPATIENT)
Dept: LAB | Facility: HOSPITAL | Age: 44
End: 2024-09-27
Attending: NURSE PRACTITIONER
Payer: COMMERCIAL

## 2024-09-27 DIAGNOSIS — E87.29 HYPERCHLOREMIC ACIDOSIS: Primary | ICD-10-CM

## 2024-09-27 LAB
ALBUMIN SERPL-MCNC: 3.8 G/DL (ref 3.5–5)
ALBUMIN/GLOB SERPL: 1.2 RATIO (ref 1.1–2)
ALP SERPL-CCNC: 114 UNIT/L (ref 40–150)
ALT SERPL-CCNC: 33 UNIT/L (ref 0–55)
ANION GAP SERPL CALC-SCNC: 7 MEQ/L
AST SERPL-CCNC: 23 UNIT/L (ref 5–34)
BASOPHILS # BLD AUTO: 0.03 X10(3)/MCL
BASOPHILS NFR BLD AUTO: 0.4 %
BILIRUB SERPL-MCNC: 0.3 MG/DL
BUN SERPL-MCNC: 9 MG/DL (ref 7–18.7)
CALCIUM SERPL-MCNC: 9.4 MG/DL (ref 8.4–10.2)
CHLORIDE SERPL-SCNC: 102 MMOL/L (ref 98–107)
CO2 SERPL-SCNC: 29 MMOL/L (ref 22–29)
CREAT SERPL-MCNC: 0.85 MG/DL (ref 0.55–1.02)
CREAT/UREA NIT SERPL: 11
EOSINOPHIL # BLD AUTO: 0.09 X10(3)/MCL (ref 0–0.9)
EOSINOPHIL NFR BLD AUTO: 1.3 %
ERYTHROCYTE [DISTWIDTH] IN BLOOD BY AUTOMATED COUNT: 12.8 % (ref 11.5–17)
GFR SERPLBLD CREATININE-BSD FMLA CKD-EPI: >60 ML/MIN/1.73/M2
GLOBULIN SER-MCNC: 3.3 GM/DL (ref 2.4–3.5)
GLUCOSE SERPL-MCNC: 149 MG/DL (ref 74–100)
HCT VFR BLD AUTO: 39.9 % (ref 37–47)
HGB BLD-MCNC: 13.5 G/DL (ref 12–16)
IMM GRANULOCYTES # BLD AUTO: 0.04 X10(3)/MCL (ref 0–0.04)
IMM GRANULOCYTES NFR BLD AUTO: 0.6 %
LYMPHOCYTES # BLD AUTO: 1.9 X10(3)/MCL (ref 0.6–4.6)
LYMPHOCYTES NFR BLD AUTO: 28.1 %
MCH RBC QN AUTO: 29.9 PG (ref 27–31)
MCHC RBC AUTO-ENTMCNC: 33.8 G/DL (ref 33–36)
MCV RBC AUTO: 88.3 FL (ref 80–94)
MONOCYTES # BLD AUTO: 0.64 X10(3)/MCL (ref 0.1–1.3)
MONOCYTES NFR BLD AUTO: 9.5 %
NEUTROPHILS # BLD AUTO: 4.05 X10(3)/MCL (ref 2.1–9.2)
NEUTROPHILS NFR BLD AUTO: 60.1 %
NRBC BLD AUTO-RTO: 0 %
PLATELET # BLD AUTO: 281 X10(3)/MCL (ref 130–400)
PMV BLD AUTO: 9.1 FL (ref 7.4–10.4)
POTASSIUM SERPL-SCNC: 4.4 MMOL/L (ref 3.5–5.1)
PROT SERPL-MCNC: 7.1 GM/DL (ref 6.4–8.3)
RBC # BLD AUTO: 4.52 X10(6)/MCL (ref 4.2–5.4)
SODIUM SERPL-SCNC: 138 MMOL/L (ref 136–145)
WBC # BLD AUTO: 6.75 X10(3)/MCL (ref 4.5–11.5)

## 2024-09-27 PROCEDURE — 85025 COMPLETE CBC W/AUTO DIFF WBC: CPT

## 2024-09-27 PROCEDURE — 36415 COLL VENOUS BLD VENIPUNCTURE: CPT

## 2024-09-27 PROCEDURE — 80053 COMPREHEN METABOLIC PANEL: CPT

## 2024-11-11 ENCOUNTER — TELEPHONE (OUTPATIENT)
Dept: INTERNAL MEDICINE | Facility: CLINIC | Age: 44
End: 2024-11-11
Payer: COMMERCIAL

## 2024-11-11 NOTE — TELEPHONE ENCOUNTER
----- Message from Nurse Christie sent at 11/11/2024  8:14 AM CST -----  Regarding: ORALIA Waller 11/18/24 @11:00a  Are there any outstanding tasks in the chart? no    Is there any documentation of tasks? no    Please tell patient to bring living will, power of , or advance directive document to visit if they have it.

## 2024-11-18 ENCOUNTER — OFFICE VISIT (OUTPATIENT)
Dept: INTERNAL MEDICINE | Facility: CLINIC | Age: 44
End: 2024-11-18
Payer: COMMERCIAL

## 2024-11-18 VITALS
DIASTOLIC BLOOD PRESSURE: 64 MMHG | OXYGEN SATURATION: 96 % | SYSTOLIC BLOOD PRESSURE: 116 MMHG | WEIGHT: 178 LBS | RESPIRATION RATE: 18 BRPM | HEIGHT: 61 IN | HEART RATE: 86 BPM | BODY MASS INDEX: 33.61 KG/M2

## 2024-11-18 DIAGNOSIS — G47.00 INSOMNIA, UNSPECIFIED TYPE: ICD-10-CM

## 2024-11-18 DIAGNOSIS — R10.31 CHRONIC RLQ PAIN: ICD-10-CM

## 2024-11-18 DIAGNOSIS — I42.7 CARDIOTOXICITY: ICD-10-CM

## 2024-11-18 DIAGNOSIS — I10 ESSENTIAL HYPERTENSION: Primary | ICD-10-CM

## 2024-11-18 DIAGNOSIS — Z23 NEED FOR TETANUS BOOSTER: ICD-10-CM

## 2024-11-18 DIAGNOSIS — Z23 FLU VACCINE NEED: ICD-10-CM

## 2024-11-18 DIAGNOSIS — R73.9 HYPERGLYCEMIA: ICD-10-CM

## 2024-11-18 DIAGNOSIS — G89.29 CHRONIC RLQ PAIN: ICD-10-CM

## 2024-11-18 DIAGNOSIS — F32.A DEPRESSION, UNSPECIFIED DEPRESSION TYPE: ICD-10-CM

## 2024-11-18 PROCEDURE — 3074F SYST BP LT 130 MM HG: CPT | Mod: CPTII,,, | Performed by: INTERNAL MEDICINE

## 2024-11-18 PROCEDURE — 99214 OFFICE O/P EST MOD 30 MIN: CPT | Mod: 25,,, | Performed by: INTERNAL MEDICINE

## 2024-11-18 PROCEDURE — 3078F DIAST BP <80 MM HG: CPT | Mod: CPTII,,, | Performed by: INTERNAL MEDICINE

## 2024-11-18 PROCEDURE — 1159F MED LIST DOCD IN RCRD: CPT | Mod: CPTII,,, | Performed by: INTERNAL MEDICINE

## 2024-11-18 PROCEDURE — 4010F ACE/ARB THERAPY RXD/TAKEN: CPT | Mod: CPTII,,, | Performed by: INTERNAL MEDICINE

## 2024-11-18 PROCEDURE — 1160F RVW MEDS BY RX/DR IN RCRD: CPT | Mod: CPTII,,, | Performed by: INTERNAL MEDICINE

## 2024-11-18 PROCEDURE — 3044F HG A1C LEVEL LT 7.0%: CPT | Mod: CPTII,,, | Performed by: INTERNAL MEDICINE

## 2024-11-18 PROCEDURE — 90471 IMMUNIZATION ADMIN: CPT | Mod: ,,, | Performed by: INTERNAL MEDICINE

## 2024-11-18 PROCEDURE — 90714 TD VACC NO PRESV 7 YRS+ IM: CPT | Mod: ,,, | Performed by: INTERNAL MEDICINE

## 2024-11-18 PROCEDURE — 90656 IIV3 VACC NO PRSV 0.5 ML IM: CPT | Mod: ,,, | Performed by: INTERNAL MEDICINE

## 2024-11-18 PROCEDURE — 3008F BODY MASS INDEX DOCD: CPT | Mod: CPTII,,, | Performed by: INTERNAL MEDICINE

## 2024-11-18 PROCEDURE — 90472 IMMUNIZATION ADMIN EACH ADD: CPT | Mod: ,,, | Performed by: INTERNAL MEDICINE

## 2024-11-18 RX ORDER — DARIDOREXANT 25 MG/1
25 TABLET, FILM COATED ORAL NIGHTLY
Qty: 30 TABLET | Refills: 5 | Status: SHIPPED | OUTPATIENT
Start: 2024-11-18

## 2024-11-18 RX ORDER — ESTRADIOL 10 UG/1
1 INSERT VAGINAL
COMMUNITY
Start: 2024-10-01

## 2024-11-18 NOTE — PROGRESS NOTES
Subjective:      Chief Complaint: Follow-up (6 mo, discuss Trazodone, having trouble falling asleep. )      HPI:She is here for f/u htn, depression.  She c/o issues falling asleep and daytime drowsiness.  She is taking 25 mg of trazodone.  When she first started using it, it worked well.  But she feels it isn't working as well anymore.  She uses the cpap nightly.  The mood is stable.  She liked the buspar.  She takes it prn.  She was taking it at night with the trazodone and rarely during the day.  If she takes the full dose of trazodone, she sleeps too long.    Her cardiologist in  stopped her coreg.  She is still taking the lisinopril.  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     Change in Nail Appearance 12/22/2022   Depression 12/22/2022   Cardiotoxicity 11/1/2022    Formatting of this note is different from the original.  Baseline echo EF 55-65%, normal GLS 6/29/2022  EF 55-65%, normal GLS 9/16/2022  EF 55-65%, abn GLS -14.22-by echo 11/1/2022    CMR 11/2022- normal LV size and function, negative for myocarditis  Formatting of this note might be different from the original. Baseline echo EF 55-65%, normal GLS 6/29/2022 EF 55-65%, normal GLS 9/16/2022 Mild elevation in troponin October 28, 2023 EF 55-65%, abn GLS -14.22 %  11/1/2022 CMR 11/2022- normal LV size and function, negative for myocarditis EF 55-65, abn GLS -14.5% 3/2023 EF 55-65, borderline GLS -16.3  9/2023 Last Assessment & Plan: Formatting of this note might be different from the original. 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  Cont coreg, lisinopril.     Malignant Neoplasm of Upper-Outer Quadrant of Right Breast in Female, Estrogen Receptor Negative 6/2/2022    Last Assessment & Plan:   Formatting of this note might be different from the original.  · History & Physical Had first dose of chemotherapy on 1/8/2022 and it is mentioned in onc notes that she was to receive Neulasta though I do not see that it was actually administered.  Consider discussing case with heme-onc.    Discharge Summary  Patient with known history of stage IIIb triple negative breast cancer status postchemotherapy on 7/8/2022, complicated by febrile neutropenia.  She has close outpatient follow-up scheduled with Dr. Del Valle this week.     Follow-up Patient with known known history of stage IIb triple negative breast cancer status postchemotherapy 7/8/2022, complicated by neutropenia..  Will need close outpatient follow-up with clinic with Dr. Del Valle     Elevated Troponin (Resolved) 11/4/2022        The patient's Health Maintenance was reviewed and the following appears to be due:   Health Maintenance Due   Topic Date Due    COVID-19 Vaccine (3 - Pfizer risk series) 06/01/2021    Influenza Vaccine (1) 09/01/2024       Past Medical History:  Past Medical History:   Diagnosis Date    Anemia     Oct. Nov of 2022    Anxiety     Breast cancer in female     right    Depression     Elevated troponin 11/4/2022    Hypertension     Menopause     Hot flashes began before chemo in July     Review of patient's allergies indicates:   Allergen Reactions    Fosaprepitant Other (See Comments)     Flushing, nausea, abdominal discomfort    Adhesive     Erythromycin base Other (See Comments) and Rash     Fever     Current Outpatient Medications on File Prior to Visit   Medication Sig Dispense Refill    acyclovir (ZOVIRAX)  400 MG tablet Take 1 tablet (400 mg total) by mouth once daily. 90 tablet 3    busPIRone (BUSPAR) 5 MG Tab Take 1 tablet (5 mg total) by mouth 3 (three) times daily as needed (anxiety). 60 tablet 11    DULoxetine (CYMBALTA) 60 MG capsule Take 2 capsules (120 mg total) by mouth once daily. 180 capsule 3    estradioL (VAGIFEM) 10 mcg Tab Place 1 tablet vaginally twice a week.      fluticasone propionate (FLONASE) 50 mcg/actuation nasal spray 1 spray by Each Nostril route once daily.      LIDOcaine-prilocaine (EMLA) cream Apply topically as needed.      lisinopriL 10 MG tablet Take 1 tablet (10 mg total) by mouth once daily. 90 tablet 3    loratadine (CLARITIN) 10 mg tablet Take 10 mg by mouth once daily.      multivit-iron-FA-calcium-mins 9 mg iron-400 mcg Tab tablet Take 1 tablet by mouth once daily.      [DISCONTINUED] traZODone (DESYREL) 50 MG tablet Take 0.5 tablets (25 mg total) by mouth nightly as needed for Insomnia. 30 tablet 11    [DISCONTINUED] carvediloL (COREG) 3.125 MG tablet SMARTSI Tablet(s) By Mouth Morning-Evening (Patient not taking: Reported on 2024)       Current Facility-Administered Medications on File Prior to Visit   Medication Dose Route Frequency Provider Last Rate Last Admin    [DISCONTINUED] scopolamine 1.3-1.5 mg (1 mg over 3 days) 1 patch  1 patch Transdermal Q3 Days Sridhar Mao MD   1 patch at 22 0624       Review of Systems   Respiratory: Negative.     Cardiovascular:  Positive for leg swelling (rarely).   Gastrointestinal:  Positive for abdominal pain (RLQ, her oncologist rec she see a urologist and orthopedist.  Seems to be associated with gas or if she has a full bladder) and constipation (She started taking a probiotic, which seemsto be helping).        Abd pain is worse when sitting and better when she stands up.   Genitourinary: Negative.    Musculoskeletal:  Positive for back pain (lower back pain).       Objective:   /64 (BP Location: Left arm,  "Patient Position: Sitting)   Pulse 86   Resp 18   Ht 5' 0.63" (1.54 m)   Wt 80.7 kg (178 lb)   LMP 05/31/2022   SpO2 96%   BMI 34.04 kg/m²     Physical Exam  Constitutional:       General: She is not in acute distress.     Appearance: Normal appearance.   HENT:      Head: Normocephalic and atraumatic.   Eyes:      General: No scleral icterus.     Conjunctiva/sclera: Conjunctivae normal.   Neck:      Vascular: No carotid bruit.   Cardiovascular:      Rate and Rhythm: Normal rate and regular rhythm.      Pulses: Normal pulses.      Heart sounds: Normal heart sounds. No murmur heard.     No friction rub. No gallop.   Pulmonary:      Effort: Pulmonary effort is normal.      Breath sounds: Normal breath sounds.   Abdominal:      General: Bowel sounds are normal.      Palpations: Abdomen is soft. There is no mass.      Tenderness: There is no abdominal tenderness. There is no guarding or rebound.   Musculoskeletal:         General: No deformity.      Cervical back: No rigidity or tenderness.      Right lower leg: No edema.      Left lower leg: No edema.      Comments: Negative straight leg raise, int and ext rotation of the left hip is without pain.     Lymphadenopathy:      Cervical: No cervical adenopathy.   Skin:     Coloration: Skin is not jaundiced or pale.      Findings: No erythema.   Neurological:      General: No focal deficit present.      Mental Status: She is alert and oriented to person, place, and time.      Gait: Gait normal.   Psychiatric:         Mood and Affect: Mood normal.         Behavior: Behavior normal.         Thought Content: Thought content normal.         Judgment: Judgment normal.       Assessment and Plan:     Essential hypertension  Controlled.  Continue lisinopril.    Depression  Stable, cont cymbalta, buspar prn.    Chronic RLQ pain  I rec she try citrucel for the constipation.  And I told her to update me if her sx are evolving.  CT report reviewed.    Cardiotoxicity  She is seeing " a cardiologist.  She stopped her coreg.  Clinically stable.    Insomnia  Trial of Quviviq.      No follow-ups on file.    Medications Ordered This Encounter   Medications    daridorexant (QUVIVIQ) 25 mg Tab     Sig: Take 25 mg by mouth every evening.     Dispense:  30 tablet     Refill:  5     [unfilled]  No orders of the defined types were placed in this encounter.

## 2024-11-18 NOTE — ASSESSMENT & PLAN NOTE
I rec she try citrucel for the constipation.  And I told her to update me if her sx are evolving.  CT report reviewed.

## 2025-01-09 ENCOUNTER — TELEPHONE (OUTPATIENT)
Dept: INTERNAL MEDICINE | Facility: CLINIC | Age: 45
End: 2025-01-09
Payer: COMMERCIAL

## 2025-01-09 NOTE — TELEPHONE ENCOUNTER
----- Message from Marcin sent at 1/9/2025  2:28 PM CST -----  Who Called: Adaedith Diz    Refill or New Rx:New Rx    RX Name and Strength: traZODone (DESYREL) 50 MG tablet    How is the patient currently taking it? (ex. 1XDay): Sig - Route: Take 0.5 tablets (25 mg total) by mouth nightly as needed for Insomnia. - Oral    Is this a 30 day or 90 day RX: 30    Local or Mail Order: Local    List of preferred pharmacies on file (remove unneeded): [id number] - [place], [place] - [address]  [address] [facility] [address]   Phone: [phone]  Fax: [phone]    Ordering Provider: Dr. Waller    Preferred Method of Contact: Phone Call    Patient's Preferred Phone Number on File: [phone]     Best Call Back Number, if different:    Additional Information: Pt stated  daridorexant (QUVIVIQ) 25 mg Tab is not covered by her insurance and they are also needing more information on why pt needs medication. Pt stated that's a hassle. Pt is no longer wanting QUVIVIQ   and is requesting to continue medication above.

## 2025-01-10 RX ORDER — TRAZODONE HYDROCHLORIDE 50 MG/1
50 TABLET ORAL NIGHTLY PRN
Qty: 90 TABLET | Refills: 3 | Status: SHIPPED | OUTPATIENT
Start: 2025-01-10 | End: 2026-01-10

## 2025-01-10 NOTE — TELEPHONE ENCOUNTER
I have signed for the following orders and/or meds.  Please inform the patient.    No orders of the defined types were placed in this encounter.    Medications Ordered This Encounter   Medications    traZODone (DESYREL) 50 MG tablet     Sig: Take 1 tablet (50 mg total) by mouth nightly as needed for Insomnia.     Dispense:  90 tablet     Refill:  3

## 2025-01-21 ENCOUNTER — PATIENT MESSAGE (OUTPATIENT)
Dept: NEUROLOGY | Facility: CLINIC | Age: 45
End: 2025-01-21

## 2025-01-21 ENCOUNTER — OFFICE VISIT (OUTPATIENT)
Dept: NEUROLOGY | Facility: CLINIC | Age: 45
End: 2025-01-21
Payer: COMMERCIAL

## 2025-01-21 DIAGNOSIS — G47.33 OSA (OBSTRUCTIVE SLEEP APNEA): Primary | ICD-10-CM

## 2025-01-21 DIAGNOSIS — G47.19 EXCESSIVE DAYTIME SLEEPINESS: ICD-10-CM

## 2025-01-21 DIAGNOSIS — E66.01 MORBID (SEVERE) OBESITY DUE TO EXCESS CALORIES: ICD-10-CM

## 2025-01-21 PROCEDURE — 98005 SYNCH AUDIO-VIDEO EST LOW 20: CPT | Mod: 95,,, | Performed by: NURSE PRACTITIONER

## 2025-01-21 PROCEDURE — 1160F RVW MEDS BY RX/DR IN RCRD: CPT | Mod: CPTII,95,, | Performed by: NURSE PRACTITIONER

## 2025-01-21 PROCEDURE — 1159F MED LIST DOCD IN RCRD: CPT | Mod: CPTII,95,, | Performed by: NURSE PRACTITIONER

## 2025-01-21 NOTE — PROGRESS NOTES
Virtual Visit Note    Subjective:          Patient ID: Rob Dennison is a 44 y.o. female.    Chief Complaint: EUGENE, EDS    HPI:           The patient location is: home  Visit type: audiovisual    EUGENE:  Cont to feel PAP therapy is beneficial  She does exp unrefreshed awakenings  Denies issues with mask/machine    She has insomnia - Trazodone 25 mg helps, higher doses cause her to be drowsy in the mornings; she recently purchased a weighted blanket and is hoping it helps with the anxiety/insomnia    Did not complete the OPO; never received a call from anyone to get it done    Excessive daytime sleepiness:  Fatigued during the day  Dozes off at computer if not busy  Does admit to drowsy driving; she has had to pull off of the road before    Naps when she can    On weekends, wakes mid morning, drinks coffee, goes back to sleep and wakes mid afternoon and by 8pm, ready to go back to bed.        2025   EPWORTH SLEEPINESS SCALE   Sitting and reading 3   Watching TV 1   Sitting, inactive in a public place (e.g. a theatre or a meeting) 1   As a passenger in a car for an hour without a break 3   Lying down to rest in the afternoon when circumstances permit 3   Sitting and talking to someone 0   Sitting quietly after a lunch without alcohol 3   In a car, while stopped for a few minutes in traffic 1   Total score 15         This is a telemedicine note. After obtaining verbal consent/patient identification using name and , patient was treated using real time audio/video, according to [facility] protocols. Tristen WEINER NP [distant provider], conducted the visit from location identified below. The patient participated in the visit at a non-[facility] location selected by the patient (or patients representative), identified below. I am licensed in the state where the patient stated they are located. The patient (or patients representative) stated that they understood and accepted the privacy and security risks to their  information at their location.    Distant provider was located at [facility]    Each patient to whom he or she provides medical services by telemedicine is:  (1) informed of the relationship between the physician and patient and the respective role of any other health care provider with respect to management of the patient; and (2) notified that he or she may decline to receive medical services by telemedicine and may withdraw from such care at any time.          Past Medical History:   Diagnosis Date    Anemia     Oct. Nov of 2022    Anxiety     Breast cancer in female     right    Depression     Elevated troponin 11/4/2022    Hypertension     Menopause     Hot flashes began before chemo in July       Past Surgical History:   Procedure Laterality Date    ABDOMINAL SURGERY      Gall bladder removal 2021    AXILLARY NODE DISSECTION Right 01/30/2023    Procedure: LYMPHADENECTOMY, AXILLARY;  Surgeon: Genie Josue MD;  Location: [facility];  Service: General;  Laterality: Right;    BACK SURGERY      CHOLECYSTECTOMY      ENDOSCOPIC RELEASE OF BOTH CARPAL TUNNELS Bilateral     SENTINEL LYMPH NODE BIOPSY Right 01/30/2023    Procedure: BIOPSY, LYMPH NODE, SENTINEL Right MagTrace Injected in office SentiMag needed No NUC MED;  Surgeon: Genie Josue MD;  Location: [facility] OR;  Service: General;  Laterality: Right;  MagTrace Injected in office  SentiMag needed  No NUC MED    SIMPLE MASTECTOMY Bilateral 01/30/2023    Procedure: MASTECTOMY, SIMPLE  Bilateral;  Surgeon: Genie Josue MD;  Location: [facility] OR;  Service: General;  Laterality: Bilateral;    SPINE SURGERY      Shaved herniated disc    TOTAL ABDOMINAL HYSTERECTOMY W/ BILATERAL SALPINGOOPHORECTOMY      TRIGGER FINGER RELEASE Right     WISDOM TOOTH EXTRACTION         Family History   Problem Relation Name Age of Onset    Hypertension Mother Honey     Hyperlipidemia Mother Honey     Arthritis Mother Honey     Hyperlipidemia Father Claudy     Stroke  Father Claudy     Skin cancer Father Claudy     Cataracts Father Claudy     Alcohol abuse Father Claudy     Colon cancer Paternal Grandfather         Social History     Socioeconomic History    Marital status:    Tobacco Use    Smoking status: Never    Smokeless tobacco: Never   Substance and Sexual Activity    Alcohol use: Yes     Comment: Occasionally    Drug use: Never    Sexual activity: Yes     Partners: Female     Birth control/protection: None     Social Drivers of Health     Financial Resource Strain: Low Risk  (8/20/2024)    Overall Financial Resource Strain (CARDIA)     Difficulty of Paying Living Expenses: Not hard at all   Food Insecurity: No Food Insecurity (8/20/2024)    Hunger Vital Sign     Worried About Running Out of Food in the Last Year: Never true     Ran Out of Food in the Last Year: Never true   Transportation Needs: No Transportation Needs (8/20/2024)    TRANSPORTATION NEEDS     Transportation : No   Physical Activity: Inactive (8/20/2024)    Exercise Vital Sign     Days of Exercise per Week: 0 days     Minutes of Exercise per Session: 0 min   Stress: No Stress Concern Present (8/20/2024)    [place] citizen [place] [employer] - Occupational Stress Questionnaire     Feeling of Stress : Not at all   Housing Stability: Low Risk  (8/20/2024)    Housing Stability Vital Sign     Unable to Pay for Housing in the Last Year: No     Homeless in the Last Year: No       Review of patient's allergies indicates:   Allergen Reactions    Fosaprepitant Other (See Comments)     Flushing, nausea, abdominal discomfort    Adhesive     Erythromycin base Other (See Comments) and Rash     Fever       Current Outpatient Medications   Medication Instructions    acyclovir (ZOVIRAX) 400 mg, Oral, Daily    busPIRone (BUSPAR) 5 mg, Oral, 3 times daily PRN    DULoxetine (CYMBALTA) 120 mg, Oral, Daily    estradioL (VAGIFEM) 10 mcg Tab 1 tablet, Twice weekly    fluticasone propionate (FLONASE) 50 mcg/actuation nasal  spray 1 spray, Daily    LIDOcaine-prilocaine (EMLA) cream As needed (PRN)    lisinopriL 10 mg, Oral, Daily    loratadine (CLARITIN) 10 mg, Daily    multivit-iron-FA-calcium-mins 9 mg iron-400 mcg Tab tablet 1 tablet, Daily    traZODone (DESYREL) 50 mg, Oral, Nightly PRN         Objective:      Physical Exam:  General- Patient alert and oriented x3 in NAD  Speech - nml  HEENT- EOMI  Resp- No increased WOB noted. Not using accessory muscles.  Skin-  No Jaundice. No visible skin lesions.        Assessment/Plan:     Problem List Items Addressed This Visit       EUGENE (obstructive sleep apnea) - Primary  Excessive daytime sleepiness      Patient is benefiting from PAP therapy; Encouraged continued use of PAP. Drowsy driving may still occur despite PAP use. Clinical follow up and replacement of supplies discussed.    No PAP data today - will have team download data Thursday when we return to the office. Once I review, if AHI >5, consider OPO.    Discussed adding Modafinil; she asked to hold off for now.     FU 3 months, may change contingent upon the PAP data           Tristen Kovacs, MSN, APRN, AGACNP-BC    Update 01/24/2025:  PAP data:   date range 12/24/2024-01/22/2025  days used >=4hr 27/30  90%  AHI 7.2  Auto 5-20    I am asking for PAP titration night given suboptimal AHI and c/o excessive daytime sleepiness. ?bipap

## 2025-01-24 NOTE — PROGRESS NOTES
[facility] - Breast [place] [facility]  Breast Surgical Oncology  Follow-Up Patient Office Visit       Referring Provider: No ref. provider found  PCP: Richa Waller MD   Care Team: No care team member to display No care team member to display     Chief Complaint:   Chief Complaint   Patient presents with    Follow-up     Patient reports no breast related concerns         Subjective:   Treatment History:  1. 2022-Genetic Testing- BRCA 1 mutation  2. Medical Oncology Referral to Dr. Marcy Del Valle  3. 2022 Mediport Placement  4. Neoadjuvant Chemotherapy - AC/Ketruda followed by weekly Taxol x 12 cycles (last dose of Taxol 2022) + Carboplatin/Keytruda (last dose of Keytruda 2022)  5. 2023 Bilateral simple mastectomy and right SLNB - consistent with complete response  6. Adjuvant Ketruda  7. TAHBSO 2024    Interval History:  2025 - Rob Dennison presents today for follow up and breast cancer surveillance. She is doing well. She has no concerns to the bilateral mastectomies and is doing well.     HPI:  Rob Dennison is a 42 y.o. female who presents on 2022 for evaluation of newly diagnosed right breast cancer.     A detailed patient history was obtained and reviewed. She currently denies any breast issues including rashes, redness, pain, swelling, nipple discharge, or new lumps/masses. She lives in [place] and previously lived in [place]. Offered referral to breast surgeon in [place] for surveillance for convenience. She prefers to continue follow up care here.      Imagin. 3/9/2022 BL SC MG at [facility] - which revealed a a focal asymmetry in the right breast central to the nipple, posterior depth.    No other significant masses, calcifications, or other findings are seen in either breast.  BIRADS-0; additional imaging needed.     2. 2022 R DG MG/ R US BREAST LIMITED at [facility] - which revealed on R MG at central region posterior depth focal asymmetry  recalled from screening mammography. On today's additional mammographic views, there are three adjacent oval equal density masses with indistinct margins located in the central region far posterior depth. The most posterior of these three masses correlates with the focal asymmetry recalled from screening. No other significant mammographic finding.  On R US, at 7 o'clock to 9 o'clock and subareolar region revealed three adjacent oval hypoechoic masses with indistinct margins at the 9 o'clock  subareolar position, correlating with the three adjacent oval equal density masses with indistinct margins seen on today's mammogram. These three masses measure 0.5 x 0.5 x 0.4 cm, 0.9 x 0.9 x 0.8 cm, and 0.8 x 0.4 x 0.5 cm.   Incidentally seen at 7 o'clock to 8 o'clock  are several subcentimeter cysts of varying size and complication. These are benign. No suspicious right axillary adenopathy. BIRADS-4 suspicious; biopsy recommended.    3. 6/8/2022 Xray Right Shoulder at [facility] - which revealed no acute osseous abnormality.     4. 6/9/2022 BL BREAST MRI at [facility] - which revealed in R breast, 9 o'clock subareolar right breast, posterior depth, there is a 2.7 x 1.5 x 1.6 cm oval mass with non circumscribed margins which demonstrates rapid, washout kinetics (axial image 333).  There is a signal void consistent with a T3 coil shaped HydroMARK clip within this known malignancy.  In the subareolar right breast, anterior to middle depth, there is a 0.7 x 0.3 x 0.7 cm oval, enhancing mass with circumscribed margins (axial image 335).  This mass is 3.5 cm anterior to the known malignancy and 2.8 cm deep to the nipple.   Inferomedial to the known malignancy, in the lower inner quadrant of the right breast, posterior depth, there are 2 areas of clumped, non mass enhancement.  The deeper of the 2 areas of clumped, non mass enhancement spans 0.8 cm (axial image 341) and is 1.4 cm anterior to the pectoralis major muscle.  The more superficial area  of clumped, non mass enhancement spans 0.7 cm (axial image 342).   Superomedial to the known malignancy, in the upper inner quadrant of the right breast, middle depth, there is linear, non mass enhancement spanning 1.7 cm anterior to posterior (axial image 327).  In total the abnormal enhancement spans 7.6 cm anterior to posterior.  There is no skin thickening or nipple retraction.  No axillary or internal mammary lymphadenopathy is identified.  In the L breast, there is no suspicious areas of enhancement or areas of architectural distortion are seen.  There is no skin thickening or nipple retraction.  No axillary or internal mammary lymphadenopathy is identified.  BIRADS-4 suspicious     Pathology:  1. 5/1/2022 Ultrasound-guided Core Needle Biopsy Right Breast Mass 9 o'clock Subareolar-  -Invasive ductal carcinoma, grade 3 (measuring 5.0mm)  ER 1%  AZ 0%  HER2 Negative  Ki67 63%    2. 1/30/2023 Bilateral simple mastectomy and Right sentinel lymph node biopsy - Right mastectomy revealed stromal sclerosis with numerous hemosiderin-laden macrophages, negative for residual carcinoma (complete pathological response - see comment). 6 sentinel lymph nodes negative for metastatic carcinoma.    Comment: This patient's history of a previous core biopsy demonstrating a high grade   invasive ductal carcinoma (ER 1%, AZ -, HER2- and Ki-67 63%) is noted. Review of   the electronic medical record reveals that this 27 mm mass was at the 9 o'clock   subareolar position, posterior depth. This region has been extensively sampled,   including 25 additional `gross recuts`.  There is no residual carcinoma. A zone   of sclerosis with numerous hemosiderin-laden macrophages represents the prior   biopsy site. The axillary lymph nodes likewise have abundant hemosiderin-laden   macrophages in the subcapsular sinusoids. The findings are indicative of a   complete pathologic response to neoadjuvant therapy.      OB/GYN History:  Age at  Menarche Onset: 10  Menopausal Status: premenopausal, LMP: 2022   Hysterectomy/Oophorectomy: no,n/a  Hormonal birth control (duration): Yes OCP (estrogen/progesterone).   Pregnancy History:   Age at first live birth: 1  Hormone Replacement Therapy: No, none  Patient admits to nipple discharge. Described as bilateral and clear after having first mammogram which has resolved   Patient denies to previous breast biopsy.   Patient denies to a personal history of breast cancer.  MG breast density: Category C (Heterogeneously dense)     Other Relevant History:  Prior thoracic RT: none  Genetic testing: yes  Ashkenazi [employer] descent: No     Family History:  Father - Skin cancer possible melanoma at age 80  Paternal Grandfather - colon cancer at age 70     Past History:  Past Medical History:   Diagnosis Date    Anemia     Oct. Nov of 2022    Anxiety     Breast cancer in female     right    Depression     Elevated troponin 2022    Hypertension     Menopause     Hot flashes began before chemo in July        Past Surgical History:   Procedure Laterality Date    ABDOMINAL SURGERY      Gall bladder removal     AXILLARY NODE DISSECTION Right 2023    Procedure: LYMPHADENECTOMY, AXILLARY;  Surgeon: Genie Josue MD;  Location: [facility] OR;  Service: General;  Laterality: Right;    BACK SURGERY      BREAST SURGERY      Bilateral mastectomy    CHOLECYSTECTOMY      ENDOSCOPIC RELEASE OF BOTH CARPAL TUNNELS Bilateral     HYSTERECTOMY      SENTINEL LYMPH NODE BIOPSY Right 2023    Procedure: BIOPSY, LYMPH NODE, SENTINEL Right MagTrace Injected in office SentiMag needed No NUC MED;  Surgeon: Genie Josue MD;  Location: [facility] OR;  Service: General;  Laterality: Right;  MagTrace Injected in office  SentiMag needed  No NUC MED    SIMPLE MASTECTOMY Bilateral 2023    Procedure: MASTECTOMY, SIMPLE  Bilateral;  Surgeon: Genie Josue MD;  Location: [facility] OR;  Service: General;  Laterality: Bilateral;     SPINE SURGERY      Shaved herniated disc    TOTAL ABDOMINAL HYSTERECTOMY W/ BILATERAL SALPINGOOPHORECTOMY      TRIGGER FINGER RELEASE Right     WISDOM TOOTH EXTRACTION          Social History     Socioeconomic History    Marital status:    Tobacco Use    Smoking status: Never    Smokeless tobacco: Never   Substance and Sexual Activity    Alcohol use: Yes     Comment: Occasionally    Drug use: Never    Sexual activity: Yes     Partners: Female     Birth control/protection: None     Social Drivers of Health     Financial Resource Strain: Low Risk  (8/20/2024)    Overall Financial Resource Strain (CARDIA)     Difficulty of Paying Living Expenses: Not hard at all   Food Insecurity: No Food Insecurity (8/20/2024)    Hunger Vital Sign     Worried About Running Out of Food in the Last Year: Never true     Ran Out of Food in the Last Year: Never true   Transportation Needs: No Transportation Needs (8/20/2024)    TRANSPORTATION NEEDS     Transportation : No   Physical Activity: Inactive (8/20/2024)    Exercise Vital Sign     Days of Exercise per Week: 0 days     Minutes of Exercise per Session: 0 min   Stress: No Stress Concern Present (8/20/2024)    [employer] - Occupational Stress Questionnaire     Feeling of Stress : Not at all   Housing Stability: Low Risk  (8/20/2024)    Housing Stability Vital Sign     Unable to Pay for Housing in the Last Year: No     Homeless in the Last Year: No        Body mass index is 34.76 kg/m².     Allergy/Medications:   Review of patient's allergies indicates:   Allergen Reactions    Fosaprepitant Other (See Comments)     Flushing, nausea, abdominal discomfort    Adhesive     Erythromycin base Other (See Comments) and Rash     Fever          Current Outpatient Medications:     acyclovir (ZOVIRAX) 400 MG tablet, Take 1 tablet (400 mg total) by mouth once daily., Disp: 90 tablet, Rfl: 3    busPIRone (BUSPAR) 5 MG Tab, Take 1 tablet (5 mg total) by mouth 3  (three) times daily as needed (anxiety)., Disp: 60 tablet, Rfl: 11    DULoxetine (CYMBALTA) 60 MG capsule, Take 2 capsules (120 mg total) by mouth once daily., Disp: 180 capsule, Rfl: 3    estradioL (VAGIFEM) 10 mcg Tab, Place 1 tablet vaginally twice a week., Disp: , Rfl:     fluticasone propionate (FLONASE) 50 mcg/actuation nasal spray, 1 spray by Each Nostril route once daily., Disp: , Rfl:     LIDOcaine-prilocaine (EMLA) cream, Apply topically as needed., Disp: , Rfl:     lisinopriL 10 MG tablet, Take 1 tablet (10 mg total) by mouth once daily., Disp: 90 tablet, Rfl: 3    loratadine (CLARITIN) 10 mg tablet, Take 10 mg by mouth once daily., Disp: , Rfl:     multivit-iron-FA-calcium-mins 9 mg iron-400 mcg Tab tablet, Take 1 tablet by mouth once daily., Disp: , Rfl:     traZODone (DESYREL) 50 MG tablet, Take 1 tablet (50 mg total) by mouth nightly as needed for Insomnia., Disp: 90 tablet, Rfl: 3       Review of Systems:  Constitutional: denies fevers, chills, weight loss  HEENT: denies blurry/double vision, changes in hearing, odynophagia, dysphagia  Respiratory: denies cough, shortness of breath  Cardiovascular: denies palpitations, swelling of the extremities  GI: denies abdominal pain, nausea/vomiting, hematochezia, frequent stools  : denies frequency, dysuria, flank pain, hematuria  Skin: denies new rashes  Neurological: denies muscular/sensory deficiencies, loss of coordination, headaches, memory changes  Endo: denies hair loss/thinning, nervousness, hot flashes, heat/cold intolerance, lumps in the neck area  Heme: denies easy bruising and fatigue  Psychological: denies anxious/depressive moods  Musculoskeletal: denies bony pain, muscle cramps, swollen joints    Objective:     Vitals:  Blood pressure 114/71, pulse 86, temperature 98.4 °F (36.9 °C), temperature source Oral, resp. rate 18, height 5' (1.524 m), weight 80.7 kg (178 lb), last menstrual period 05/31/2022, SpO2 98%.    Physical Exam:  General: The  "patient is awake, alert and oriented times three. The patient is well nourished and in no acute distress.    Musculoskeletal: The patient has a normal range of motion of her bilateral upper extremities.    Breast:   Examination of the Mastectomy sites bilaterally fails to reveal any dominant masses or areas of significant focal nodularity. The nipples are surgically absent bilaterally. There are no significant skin changes overlying the breasts. There is no skin dimpling with movement of the pectoralis.   Integumentary: no rashes or skin lesions present  Neurologic: cranial nerves intact, no signs of peripheral neurological deficit, motor/sensory function intact      Assessment and Plan:     Encounter Diagnoses   Name Primary?    Breast cancer, BRCA1 positive, right Yes    Malignant neoplasm of central portion of right breast in female, estrogen receptor negative     S/P bilateral mastectomy                     Adarelis "Beatrice" was seen today for follow-up.    Diagnoses and all orders for this visit:    Breast cancer, BRCA1 positive, right    Malignant neoplasm of central portion of right breast in female, estrogen receptor negative    S/P bilateral mastectomy               Plan:       RTC in 1 year for CBE.      Port removed today by Dr. Mckee    Follow up with medical oncology.    BRCA mutation - S/p preventative hysterotomy and oophorectomy and s/p BL simple mastectomy.  Continue screening for melanoma with dermatology.    Colonoscopy with Dr. Michael Wright was benign. Next screening colonoscopy recommended 2029.    Healthy lifestyle guidelines were reviewed. She was encouraged to engage in regular exercise, maintain a healthy body weight, and avoid excessive alcohol consumption. Healthy nutritional guidelines were also discussed. Self-breast examination was reviewed with the patient in detail and she was encouraged to perform this on a monthly basis.         All of her questions were answered. She was advised to " call if she develops any questions or concerns.    Shavon Rizzo PA-C      Total time on the date of the visit ranged from 30-39 mins (48742). Total time includes both face-to-face and non-face-to-face time personally spent by myself on the day of the visit.    Non-face-to-face time included:  _X_ preparing to see the patient such as reviewing the patient record  __ obtaining and reviewing separately obtained history  _X_ independently interpreting results  _X_ documenting clinical information in electronic health record.    Face-to-face time included:  _X_ performing an appropriate history and examination  _X_ communicating results to the patient  _X_ counseling and educating the patient  __ ordering appropriate medications  __ ordering appropriate tests  _X_ ordering appropriate procedures (including follow-up)  _X_ answering any questions the patient had    Total Time spent on date of visit: 35 minutes

## 2025-01-28 ENCOUNTER — OFFICE VISIT (OUTPATIENT)
Dept: SURGERY | Facility: CLINIC | Age: 45
End: 2025-01-28
Payer: COMMERCIAL

## 2025-01-28 ENCOUNTER — CLINICAL SUPPORT (OUTPATIENT)
Dept: SURGERY | Facility: CLINIC | Age: 45
End: 2025-01-28
Payer: COMMERCIAL

## 2025-01-28 VITALS
RESPIRATION RATE: 18 BRPM | OXYGEN SATURATION: 98 % | WEIGHT: 178 LBS | TEMPERATURE: 98 F | HEART RATE: 86 BPM | BODY MASS INDEX: 34.95 KG/M2 | DIASTOLIC BLOOD PRESSURE: 71 MMHG | HEIGHT: 60 IN | SYSTOLIC BLOOD PRESSURE: 114 MMHG

## 2025-01-28 DIAGNOSIS — C50.111 MALIGNANT NEOPLASM OF CENTRAL PORTION OF RIGHT BREAST IN FEMALE, ESTROGEN RECEPTOR NEGATIVE: ICD-10-CM

## 2025-01-28 DIAGNOSIS — Z17.1 MALIGNANT NEOPLASM OF CENTRAL PORTION OF RIGHT BREAST IN FEMALE, ESTROGEN RECEPTOR NEGATIVE: ICD-10-CM

## 2025-01-28 DIAGNOSIS — Z95.828 PORT-A-CATH IN PLACE: Primary | ICD-10-CM

## 2025-01-28 DIAGNOSIS — Z15.01 BREAST CANCER, BRCA1 POSITIVE, RIGHT: Primary | ICD-10-CM

## 2025-01-28 DIAGNOSIS — C50.911 BREAST CANCER, BRCA1 POSITIVE, RIGHT: Primary | ICD-10-CM

## 2025-01-28 DIAGNOSIS — Z90.13 S/P BILATERAL MASTECTOMY: ICD-10-CM

## 2025-01-28 PROCEDURE — 99214 OFFICE O/P EST MOD 30 MIN: CPT | Mod: 25,S$GLB,, | Performed by: PHYSICIAN ASSISTANT

## 2025-01-28 PROCEDURE — 36590 REMOVAL TUNNELED CV CATH: CPT | Mod: S$GLB,,, | Performed by: SURGERY

## 2025-01-28 PROCEDURE — 3078F DIAST BP <80 MM HG: CPT | Mod: CPTII,S$GLB,, | Performed by: PHYSICIAN ASSISTANT

## 2025-01-28 PROCEDURE — 99999 PR PBB SHADOW E&M-EST. PATIENT-LVL II: CPT | Mod: PBBFAC,,, | Performed by: SURGERY

## 2025-01-28 PROCEDURE — 99999 PR PBB SHADOW E&M-EST. PATIENT-LVL IV: CPT | Mod: PBBFAC,,, | Performed by: PHYSICIAN ASSISTANT

## 2025-01-28 PROCEDURE — 3008F BODY MASS INDEX DOCD: CPT | Mod: CPTII,S$GLB,, | Performed by: PHYSICIAN ASSISTANT

## 2025-01-28 PROCEDURE — 3074F SYST BP LT 130 MM HG: CPT | Mod: CPTII,S$GLB,, | Performed by: PHYSICIAN ASSISTANT

## 2025-01-28 PROCEDURE — 1159F MED LIST DOCD IN RCRD: CPT | Mod: CPTII,S$GLB,, | Performed by: PHYSICIAN ASSISTANT

## 2025-01-28 NOTE — PROGRESS NOTES
PREOPERATIVE DIAGNOSIS: tunneled subcutaneous central venous catheter     POSTOPERATIVE DIAGNOSIS: Same as above     Informed written consent was obtained prior to surgery after discussion of the risk and benefits.     ANESTHESIA: 15 ml of local infiltration of Lidocaine 1% with epinephrine     PROCEDURES PERFORMED: Removal of left subclavian Mediport tunneled catheter and subcutaneous port     PROCEDURE IN DETAIL:   The patient was placed supine position. The skin of the chest, at site of Mediport and previous incision was prepped and draped in standard sterile surgical fashion. An incision was planned of the right infraclavicular area about 2 fingerbreadths below the clavicle at the site of previous incision. Using Lidocaine 1% with Epinephrine the skin and subcutaneous tissues were anesthetized. The incision was made with a 15-blade. The subcutaneous tissue was dissected using sharp dissection. Hemostasis was checked and maintained. The catheter was identified with in the cavity. Using a 3-0 Monocryl suture and stitch was placed around the entry point of catheter. The catheter was there removed from the subclavian vein and clamped with hemostat. The 3-0 Monocryl suture was tied down. The subcutaneous pocket housing the Mediport was entered and the Mediport was dissected free. The Mediport and catheter were removed and photographed. Hemostasis was again checked and maintained.    The subcutaneous tissues were closed with 3-0 Monocryl and the skin closed with running 4-0 Monocryl subcuticular stitches. Dermabond was applied followed by sterile dressings.         ESTIMATED BLOOD LOSS: 1 ml          Condition: Good     Disposition: Patient tolerated the procedure well with no immediate complications     Post-procedure instructions were provided  Remove outer dressings in 24 hours  May shower after 24 hours, no baths for 2 weeks (do not submerge under water for 2 weeks)  Steri-strips to remain for up to 7 days and can  be removed if they have not fallen off by 7 days.  Apply ice pack as needed for pain - 20 minutes at a time  Over the counter pain medications - ibuprofen or Tylenol as needed      All of questions were answered    Irene Mckee MD  Breast Surgical Oncology

## 2025-02-08 ENCOUNTER — PROCEDURE VISIT (OUTPATIENT)
Dept: SLEEP MEDICINE | Facility: HOSPITAL | Age: 45
End: 2025-02-08
Attending: NURSE PRACTITIONER
Payer: COMMERCIAL

## 2025-02-08 DIAGNOSIS — G47.19 EXCESSIVE DAYTIME SLEEPINESS: ICD-10-CM

## 2025-02-08 DIAGNOSIS — G47.33 OSA (OBSTRUCTIVE SLEEP APNEA): ICD-10-CM

## 2025-02-08 PROCEDURE — 95811 POLYSOM 6/>YRS CPAP 4/> PARM: CPT | Mod: 26,,, | Performed by: SPECIALIST

## 2025-02-08 PROCEDURE — 95811 POLYSOM 6/>YRS CPAP 4/> PARM: CPT

## 2025-02-11 ENCOUNTER — OFFICE VISIT (OUTPATIENT)
Dept: SURGERY | Facility: CLINIC | Age: 45
End: 2025-02-11
Payer: COMMERCIAL

## 2025-02-11 VITALS
SYSTOLIC BLOOD PRESSURE: 122 MMHG | BODY MASS INDEX: 34.95 KG/M2 | HEIGHT: 60 IN | WEIGHT: 178 LBS | OXYGEN SATURATION: 100 % | HEART RATE: 101 BPM | RESPIRATION RATE: 18 BRPM | TEMPERATURE: 99 F | DIASTOLIC BLOOD PRESSURE: 82 MMHG

## 2025-02-11 DIAGNOSIS — Z48.89 ENCOUNTER FOR POST SURGICAL WOUND CHECK: Primary | ICD-10-CM

## 2025-02-11 PROCEDURE — 1159F MED LIST DOCD IN RCRD: CPT | Mod: CPTII,S$GLB,, | Performed by: SURGERY

## 2025-02-11 PROCEDURE — 99999 PR PBB SHADOW E&M-EST. PATIENT-LVL IV: CPT | Mod: PBBFAC,,, | Performed by: SURGERY

## 2025-02-11 PROCEDURE — 99212 OFFICE O/P EST SF 10 MIN: CPT | Mod: S$GLB,,, | Performed by: SURGERY

## 2025-02-11 PROCEDURE — 3008F BODY MASS INDEX DOCD: CPT | Mod: CPTII,S$GLB,, | Performed by: SURGERY

## 2025-02-11 PROCEDURE — 1160F RVW MEDS BY RX/DR IN RCRD: CPT | Mod: CPTII,S$GLB,, | Performed by: SURGERY

## 2025-02-11 PROCEDURE — 3079F DIAST BP 80-89 MM HG: CPT | Mod: CPTII,S$GLB,, | Performed by: SURGERY

## 2025-02-11 PROCEDURE — 3074F SYST BP LT 130 MM HG: CPT | Mod: CPTII,S$GLB,, | Performed by: SURGERY

## 2025-02-14 NOTE — PROGRESS NOTES
[facility] - [facility]  Breast Surgical Oncology  Follow-Up Patient Office Visit       Referring Provider: No ref. provider found  PCP: Rciha Waller MD   Care Team: No care team member to display No care team member to display     Chief Complaint:   Chief Complaint   Patient presents with    Follow-up     Patient c/o Mediport incision open left side x 2 days redness, mild swelling, drainage          Subjective:   Treatment History:  1. 2022-Genetic Testing- BRCA 1 mutation  2. Medical Oncology Referral to Dr. Marcy Del Valle  3. 2022 Mediport Placement  4. Neoadjuvant Chemotherapy - AC/Ketruda followed by weekly Taxol x 12 cycles (last dose of Taxol 2022) + Carboplatin/Keytruda (last dose of Keytruda 2022)  5. 2023 Bilateral simple mastectomy and right SLNB - consistent with complete response  6. Adjuvant Ketruda  7. TAHBSO 2024    Interval History:  2025 - Rob Dennison presents today for evaluation of left Mediport removal site. She states the area started itching and becoming red. She removed the Dermabond. She denies fevers, chills, and other symptoms. Seems to be an allergic reaction.    HPI:  Rob Dennison is a 42 y.o. female who presents on 2022 for evaluation of newly diagnosed right breast cancer.     A detailed patient history was obtained and reviewed. She currently denies any breast issues including rashes, redness, pain, swelling, nipple discharge, or new lumps/masses. She lives in [place] and previously lived in [place]. Offered referral to breast surgeon in [place] for surveillance for convenience. She prefers to continue follow up care here.      Imagin. 3/9/2022 BL SC MG at [facility] - which revealed a a focal asymmetry in the right breast central to the nipple, posterior depth.    No other significant masses, calcifications, or other findings are seen in either breast.  BIRADS-0; additional imaging needed.     2. 2022 RA  DG MG/ R US BREAST LIMITED at [facility] - which revealed on R MG at central region posterior depth focal asymmetry recalled from screening mammography. On today's additional mammographic views, there are three adjacent oval equal density masses with indistinct margins located in the central region far posterior depth. The most posterior of these three masses correlates with the focal asymmetry recalled from screening. No other significant mammographic finding.  On R US, at 7 o'clock to 9 o'clock and subareolar region revealed three adjacent oval hypoechoic masses with indistinct margins at the 9 o'clock  subareolar position, correlating with the three adjacent oval equal density masses with indistinct margins seen on today's mammogram. These three masses measure 0.5 x 0.5 x 0.4 cm, 0.9 x 0.9 x 0.8 cm, and 0.8 x 0.4 x 0.5 cm.   Incidentally seen at 7 o'clock to 8 o'clock  are several subcentimeter cysts of varying size and complication. These are benign. No suspicious right axillary adenopathy. BIRADS-4 suspicious; biopsy recommended.    3. 6/8/2022 Xray Right Shoulder at [facility] - which revealed no acute osseous abnormality.     4. 6/9/2022 BL BREAST MRI at [facility] - which revealed in R breast, 9 o'clock subareolar right breast, posterior depth, there is a 2.7 x 1.5 x 1.6 cm oval mass with non circumscribed margins which demonstrates rapid, washout kinetics (axial image 333).  There is a signal void consistent with a T3 coil shaped HydroMARK clip within this known malignancy.  In the subareolar right breast, anterior to middle depth, there is a 0.7 x 0.3 x 0.7 cm oval, enhancing mass with circumscribed margins (axial image 335).  This mass is 3.5 cm anterior to the known malignancy and 2.8 cm deep to the nipple.   Inferomedial to the known malignancy, in the lower inner quadrant of the right breast, posterior depth, there are 2 areas of clumped, non mass enhancement.  The deeper of the 2 areas of clumped, non mass enhancement  spans 0.8 cm (axial image 341) and is 1.4 cm anterior to the pectoralis major muscle.  The more superficial area of clumped, non mass enhancement spans 0.7 cm (axial image 342).   Superomedial to the known malignancy, in the upper inner quadrant of the right breast, middle depth, there is linear, non mass enhancement spanning 1.7 cm anterior to posterior (axial image 327).  In total the abnormal enhancement spans 7.6 cm anterior to posterior.  There is no skin thickening or nipple retraction.  No axillary or internal mammary lymphadenopathy is identified.  In the L breast, there is no suspicious areas of enhancement or areas of architectural distortion are seen.  There is no skin thickening or nipple retraction.  No axillary or internal mammary lymphadenopathy is identified.  BIRADS-4 suspicious     Pathology:  1. 5/1/2022 Ultrasound-guided Core Needle Biopsy Right Breast Mass 9 o'clock Subareolar-  -Invasive ductal carcinoma, grade 3 (measuring 5.0mm)  ER 1%  KY 0%  HER2 Negative  Ki67 63%    2. 1/30/2023 Bilateral simple mastectomy and Right sentinel lymph node biopsy - Right mastectomy revealed stromal sclerosis with numerous hemosiderin-laden macrophages, negative for residual carcinoma (complete pathological response - see comment). 6 sentinel lymph nodes negative for metastatic carcinoma.    Comment: This patient's history of a previous core biopsy demonstrating a high grade   invasive ductal carcinoma (ER 1%, KY -, HER2- and Ki-67 63%) is noted. Review of   the electronic medical record reveals that this 27 mm mass was at the 9 o'clock   subareolar position, posterior depth. This region has been extensively sampled,   including 25 additional `gross recuts`.  There is no residual carcinoma. A zone   of sclerosis with numerous hemosiderin-laden macrophages represents the prior   biopsy site. The axillary lymph nodes likewise have abundant hemosiderin-laden   macrophages in the subcapsular sinusoids. The  findings are indicative of a   complete pathologic response to neoadjuvant therapy.      OB/GYN History:  Age at Menarche Onset: 10  Menopausal Status: premenopausal, LMP: 2022   Hysterectomy/Oophorectomy: no,n/a  Hormonal birth control (duration): Yes OCP (estrogen/progesterone).   Pregnancy History:   Age at first live birth: 1  Hormone Replacement Therapy: No, none  Patient admits to nipple discharge. Described as bilateral and clear after having first mammogram which has resolved   Patient denies to previous breast biopsy.   Patient denies to a personal history of breast cancer.  MG breast density: Category C (Heterogeneously dense)     Other Relevant History:  Prior thoracic RT: none  Genetic testing: yes  Ashkenazi Quaker descent: No     Family History:  Father - Skin cancer possible melanoma at age 80  Paternal Grandfather - colon cancer at age 70     Past History:  Past Medical History:   Diagnosis Date    Anemia     Oct. Nov of 2022    Anxiety     Breast cancer in female     right    Depression     Elevated troponin 2022    Hypertension     Menopause     Hot flashes began before chemo in July        Past Surgical History:   Procedure Laterality Date    ABDOMINAL SURGERY      Gall bladder removal     AXILLARY NODE DISSECTION Right 2023    Procedure: LYMPHADENECTOMY, AXILLARY;  Surgeon: Genie Josue MD;  Location: [facility] OR;  Service: General;  Laterality: Right;    BACK SURGERY      BREAST SURGERY      Bilateral mastectomy    CHOLECYSTECTOMY      ENDOSCOPIC RELEASE OF BOTH CARPAL TUNNELS Bilateral     HYSTERECTOMY      SENTINEL LYMPH NODE BIOPSY Right 2023    Procedure: BIOPSY, LYMPH NODE, SENTINEL Right MagTrace Injected in office SentiMag needed No NUC MED;  Surgeon: Genie Josue MD;  Location: [facility] OR;  Service: General;  Laterality: Right;  MagTrace Injected in office  SentiMag needed  No NUC MED    SIMPLE MASTECTOMY Bilateral 2023    Procedure: MASTECTOMY,  SIMPLE  Bilateral;  Surgeon: Genie Josue MD;  Location: [facility];  Service: General;  Laterality: Bilateral;    SPINE SURGERY      Shaved herniated disc    TOTAL ABDOMINAL HYSTERECTOMY W/ BILATERAL SALPINGOOPHORECTOMY      TRIGGER FINGER RELEASE Right     WISDOM TOOTH EXTRACTION          Social History     Socioeconomic History    Marital status:    Tobacco Use    Smoking status: Never    Smokeless tobacco: Never   Substance and Sexual Activity    Alcohol use: Yes     Comment: Occasionally    Drug use: Never    Sexual activity: Yes     Partners: Female     Birth control/protection: None     Social Drivers of Health     Financial Resource Strain: Low Risk  (8/20/2024)    Overall Financial Resource Strain (CARDIA)     Difficulty of Paying Living Expenses: Not hard at all   Food Insecurity: No Food Insecurity (8/20/2024)    Hunger Vital Sign     Worried About Running Out of Food in the Last Year: Never true     Ran Out of Food in the Last Year: Never true   Transportation Needs: No Transportation Needs (8/20/2024)    TRANSPORTATION NEEDS     Transportation : No   Physical Activity: Inactive (8/20/2024)    Exercise Vital Sign     Days of Exercise per Week: 0 days     Minutes of Exercise per Session: 0 min   Stress: No Stress Concern Present (8/20/2024)    [employer] - Occupational Stress Questionnaire     Feeling of Stress : Not at all   Housing Stability: Unknown (8/20/2024)    Housing Stability Vital Sign     Unable to Pay for Housing in the Last Year: No     Homeless in the Last Year: No        Body mass index is 34.76 kg/m².     Allergy/Medications:   Review of patient's allergies indicates:   Allergen Reactions    Fosaprepitant Other (See Comments)     Flushing, nausea, abdominal discomfort    Adhesive     Erythromycin base Other (See Comments) and Rash     Fever          Current Outpatient Medications:     acyclovir (ZOVIRAX) 400 MG tablet, Take 1 tablet (400 mg total) by  mouth once daily., Disp: 90 tablet, Rfl: 3    busPIRone (BUSPAR) 5 MG Tab, Take 1 tablet (5 mg total) by mouth 3 (three) times daily as needed (anxiety)., Disp: 60 tablet, Rfl: 11    DULoxetine (CYMBALTA) 60 MG capsule, Take 2 capsules (120 mg total) by mouth once daily., Disp: 180 capsule, Rfl: 3    estradioL (VAGIFEM) 10 mcg Tab, Place 1 tablet vaginally twice a week., Disp: , Rfl:     fluticasone propionate (FLONASE) 50 mcg/actuation nasal spray, 1 spray by Each Nostril route once daily., Disp: , Rfl:     LIDOcaine-prilocaine (EMLA) cream, Apply topically as needed., Disp: , Rfl:     lisinopriL 10 MG tablet, Take 1 tablet (10 mg total) by mouth once daily., Disp: 90 tablet, Rfl: 3    loratadine (CLARITIN) 10 mg tablet, Take 10 mg by mouth once daily., Disp: , Rfl:     multivit-iron-FA-calcium-mins 9 mg iron-400 mcg Tab tablet, Take 1 tablet by mouth once daily., Disp: , Rfl:     traZODone (DESYREL) 50 MG tablet, Take 1 tablet (50 mg total) by mouth nightly as needed for Insomnia., Disp: 90 tablet, Rfl: 3       Review of Systems:  Constitutional: denies fevers, chills, weight loss  HEENT: denies blurry/double vision, changes in hearing, odynophagia, dysphagia  Respiratory: denies cough, shortness of breath  Cardiovascular: denies palpitations, swelling of the extremities  GI: denies abdominal pain, nausea/vomiting, hematochezia, frequent stools  : denies frequency, dysuria, flank pain, hematuria  Skin: denies new rashes  Neurological: denies muscular/sensory deficiencies, loss of coordination, headaches, memory changes  Endo: denies hair loss/thinning, nervousness, hot flashes, heat/cold intolerance, lumps in the neck area  Heme: denies easy bruising and fatigue  Psychological: denies anxious/depressive moods  Musculoskeletal: denies bony pain, muscle cramps, swollen joints    Objective:     Vitals:  Blood pressure 122/82, pulse 101, temperature 98.8 °F (37.1 °C), temperature source Oral, resp. rate 18, height  "5' (1.524 m), weight 80.7 kg (178 lb), last menstrual period 05/31/2022, SpO2 100%.    Physical Exam:  General: The patient is awake, alert and oriented times three. The patient is well nourished and in no acute distress.    Chest: Left infraclavicular incision has some redness consistent with an allergic reaction. There is no evidence of infection. The redness is in the shape where the Dermabond once was.         Assessment and Plan:     Encounter Diagnoses   Name Primary?    Encounter for post surgical wound check Yes            Adarelis "Beatrice" was seen today for follow-up.    Diagnoses and all orders for this visit:    Encounter for post surgical wound check             Plan:         Will continue to monitor. Will have her place triple antibiotic ointment on the area which should help with the itching and redness.     All of questions were answered    Genie Josue MD  Breast Surgical Oncology        Total time on the date of the visit ranged from 30-39 mins (46941). Total time includes both face-to-face and non-face-to-face time personally spent by myself on the day of the visit.    Non-face-to-face time included:  _X_ preparing to see the patient such as reviewing the patient record  __ obtaining and reviewing separately obtained history  _X_ independently interpreting results  _X_ documenting clinical information in electronic health record.    Face-to-face time included:  _X_ performing an appropriate history and examination  _X_ communicating results to the patient  _X_ counseling and educating the patient  __ ordering appropriate medications  __ ordering appropriate tests  _X_ ordering appropriate procedures (including follow-up)  _X_ answering any questions the patient had    Total Time spent on date of visit: 21 minutes               "

## 2025-02-27 ENCOUNTER — OFFICE VISIT (OUTPATIENT)
Dept: NEUROLOGY | Facility: CLINIC | Age: 45
End: 2025-02-27
Payer: COMMERCIAL

## 2025-02-27 DIAGNOSIS — G47.33 OSA (OBSTRUCTIVE SLEEP APNEA): Primary | ICD-10-CM

## 2025-02-27 DIAGNOSIS — G47.19 EXCESSIVE DAYTIME SLEEPINESS: ICD-10-CM

## 2025-02-27 RX ORDER — MODAFINIL 200 MG/1
200 TABLET ORAL DAILY
Qty: 30 TABLET | Refills: 0 | Status: SHIPPED | OUTPATIENT
Start: 2025-02-27

## 2025-02-27 NOTE — PROGRESS NOTES
Virtual Visit Note    Subjective:        Patient ID: Rob Dennison is a 45 y.o. female.    Chief Complaint: EUGENE; results of CPAP titration    HPI:           The patient location is: work  Visit type: audiovisual    Suboptimal AHI and excessive daytime sleepiness so asked for PAP titration. Here to review.    PAP titration:  AHI 4.3  Jn 85%  <88% 1.9 minutes     Excessive daytime sleepiness:  Fatigued during the day  Dozes off at computer if not busy  Does admit to drowsy driving; she has had to pull off of the road before     Naps when she can     On weekends, wakes mid morning, drinks coffee, goes back to sleep and wakes mid afternoon and by 8pm, ready to go back to bed.      This is a telemedicine note. After obtaining verbal consent/patient identification using name and , patient was treated using real time audio/video, according to [facility] protocols. Tristen WEINER NP [distant provider], conducted the visit from location identified below. The patient participated in the visit at a non-[facility] location selected by the patient (or patients representative), identified below. I am licensed in the state where the patient stated they are located. The patient (or patients representative) stated that they understood and accepted the privacy and security risks to their information at their location.    Distant provider was located at [facility]    Each patient to whom he or she provides medical services by telemedicine is:  (1) informed of the relationship between the physician and patient and the respective role of any other health care provider with respect to management of the patient; and (2) notified that he or she may decline to receive medical services by telemedicine and may withdraw from such care at any time.          Past Medical History:   Diagnosis Date    Anemia     Oct. Nov of 2022    Anxiety     Breast cancer in female     right    Depression     Elevated troponin 2022     Hypertension     Menopause     Hot flashes began before chemo in July Past Surgical History:   Procedure Laterality Date    ABDOMINAL SURGERY      Gall bladder removal 2021    AXILLARY NODE DISSECTION Right 01/30/2023    Procedure: LYMPHADENECTOMY, AXILLARY;  Surgeon: Genie Josue MD;  Location: [facility];  Service: General;  Laterality: Right;    BACK SURGERY      BREAST SURGERY      Bilateral mastectomy    CHOLECYSTECTOMY      ENDOSCOPIC RELEASE OF BOTH CARPAL TUNNELS Bilateral     HYSTERECTOMY      SENTINEL LYMPH NODE BIOPSY Right 01/30/2023    Procedure: BIOPSY, LYMPH NODE, SENTINEL Right MagTrace Injected in office SentiMag needed No NUC MED;  Surgeon: Genie Josue MD;  Location: [facility] OR;  Service: General;  Laterality: Right;  MagTrace Injected in office  SentiMag needed  No NUC MED    SIMPLE MASTECTOMY Bilateral 01/30/2023    Procedure: MASTECTOMY, SIMPLE  Bilateral;  Surgeon: Genie Josue MD;  Location: [facility];  Service: General;  Laterality: Bilateral;    SPINE SURGERY      Shaved herniated disc    TOTAL ABDOMINAL HYSTERECTOMY W/ BILATERAL SALPINGOOPHORECTOMY      TRIGGER FINGER RELEASE Right     WISDOM TOOTH EXTRACTION         Family History   Problem Relation Name Age of Onset    Hypertension Mother Honey     Hyperlipidemia Mother Honey     Arthritis Mother Honey     Cancer Mother Honey         Lymphoma    Miscarriages / Stillbirths Mother Honey     Hyperlipidemia Father Claudy     Stroke Father Claudy     Skin cancer Father Claudy     Cataracts Father Claudy     Alcohol abuse Father Claudy     Colon cancer Paternal Grandfather         Social History     Socioeconomic History    Marital status:    Tobacco Use    Smoking status: Never    Smokeless tobacco: Never   Substance and Sexual Activity    Alcohol use: Yes     Comment: Occasionally    Drug use: Never    Sexual activity: Yes     Partners: Female     Birth control/protection: None     Social Drivers of Health     Financial Resource  Strain: Low Risk  (8/20/2024)    Overall Financial Resource Strain (CARDIA)     Difficulty of Paying Living Expenses: Not hard at all   Food Insecurity: No Food Insecurity (8/20/2024)    Hunger Vital Sign     Worried About Running Out of Food in the Last Year: Never true     Ran Out of Food in the Last Year: Never true   Transportation Needs: No Transportation Needs (8/20/2024)    TRANSPORTATION NEEDS     Transportation : No   Physical Activity: Inactive (8/20/2024)    Exercise Vital Sign     Days of Exercise per Week: 0 days     Minutes of Exercise per Session: 0 min   Stress: No Stress Concern Present (8/20/2024)    [employer] - Occupational Stress Questionnaire     Feeling of Stress : Not at all   Housing Stability: Unknown (8/20/2024)    Housing Stability Vital Sign     Unable to Pay for Housing in the Last Year: No     Homeless in the Last Year: No       Review of patient's allergies indicates:   Allergen Reactions    Fosaprepitant Other (See Comments)     Flushing, nausea, abdominal discomfort    Adhesive     Erythromycin base Other (See Comments) and Rash     Fever     Current Outpatient Medications   Medication Instructions    acyclovir (ZOVIRAX) 400 mg, Oral, Daily    busPIRone (BUSPAR) 5 mg, Oral, 3 times daily PRN    DULoxetine (CYMBALTA) 120 mg, Oral, Daily    estradioL (VAGIFEM) 10 mcg Tab 1 tablet, Twice weekly    fluticasone propionate (FLONASE) 50 mcg/actuation nasal spray 1 spray, Daily    lisinopriL 10 mg, Oral, Daily    loratadine (CLARITIN) 10 mg, Daily    multivit-iron-FA-calcium-mins 9 mg iron-400 mcg Tab tablet 1 tablet, Daily    traZODone (DESYREL) 50 mg, Oral, Nightly PRN     Objective:      Physical Exam:  General- Patient alert and oriented x3 in NAD  Speech - nml  HEENT- EOMI  Resp- No increased WOB noted. Not using accessory muscles.  Skin-  No Jaundice. No visible skin lesions.        Assessment/Plan:     Problem List Items Addressed This Visit       EUGENE  (obstructive sleep apnea) - Primary    Excessive daytime sleepiness      Excessive daytime sleepiness:  Fatigued during the day  Dozes off at computer if not busy  Does admit to drowsy driving; she has had to pull off of the road before     Naps when she can     On weekends, wakes mid morning, drinks coffee, goes back to sleep and wakes mid afternoon and by 8pm, ready to go back to bed.        Change auto pap settings to 6-14cm    Start Modafanil 200 mg; 0.5 tab every morning for a week then 1 tab every morning thereafter. Medication administration personally reviewed with patient by MD/provider. Potential or actual medication changes discussed. Common and potentially serious side effects of medications or medication changes discussed.      Follow up 8-10 weeks         Tristen Kovacs, MSN, APRN, AGACNP-BC

## 2025-04-15 ENCOUNTER — LAB VISIT (OUTPATIENT)
Dept: LAB | Facility: HOSPITAL | Age: 45
End: 2025-04-15
Attending: FAMILY MEDICINE
Payer: COMMERCIAL

## 2025-04-15 DIAGNOSIS — Z15.02 BRCA1-ASSOCIATED PROTEIN-1 TUMOR PREDISPOSITION SYNDROME: ICD-10-CM

## 2025-04-15 DIAGNOSIS — I10 PRIMARY HYPERTENSION: ICD-10-CM

## 2025-04-15 DIAGNOSIS — E66.811 CLASS 1 OBESITY DUE TO EXCESS CALORIES WITHOUT SERIOUS COMORBIDITY WITH BODY MASS INDEX (BMI) OF 34.0 TO 34.9 IN ADULT: ICD-10-CM

## 2025-04-15 DIAGNOSIS — Z15.01 BRCA1-ASSOCIATED PROTEIN-1 TUMOR PREDISPOSITION SYNDROME: ICD-10-CM

## 2025-04-15 DIAGNOSIS — E66.09 CLASS 1 OBESITY DUE TO EXCESS CALORIES WITHOUT SERIOUS COMORBIDITY WITH BODY MASS INDEX (BMI) OF 34.0 TO 34.9 IN ADULT: ICD-10-CM

## 2025-04-15 DIAGNOSIS — R63.5 WEIGHT GAIN: ICD-10-CM

## 2025-04-15 DIAGNOSIS — G47.33 OSA (OBSTRUCTIVE SLEEP APNEA): ICD-10-CM

## 2025-04-15 DIAGNOSIS — G47.00 INSOMNIA, UNSPECIFIED TYPE: ICD-10-CM

## 2025-04-15 DIAGNOSIS — L65.9 HAIR THINNING: ICD-10-CM

## 2025-04-15 DIAGNOSIS — K59.00 CONSTIPATION, UNSPECIFIED CONSTIPATION TYPE: ICD-10-CM

## 2025-04-15 DIAGNOSIS — F41.9 ANXIETY: ICD-10-CM

## 2025-04-15 DIAGNOSIS — Z85.3 HISTORY OF BREAST CANCER: ICD-10-CM

## 2025-04-15 DIAGNOSIS — E11.65 TYPE 2 DIABETES MELLITUS WITH HYPERGLYCEMIA, WITHOUT LONG-TERM CURRENT USE OF INSULIN: Primary | ICD-10-CM

## 2025-04-15 DIAGNOSIS — Z15.09 BRCA1-ASSOCIATED PROTEIN-1 TUMOR PREDISPOSITION SYNDROME: ICD-10-CM

## 2025-04-15 LAB
ALBUMIN SERPL-MCNC: 3.8 G/DL (ref 3.5–5)
ALBUMIN/GLOB SERPL: 1.2 RATIO (ref 1.1–2)
ALP SERPL-CCNC: 99 UNIT/L (ref 40–150)
ALT SERPL-CCNC: 27 UNIT/L (ref 0–55)
ANION GAP SERPL CALC-SCNC: 9 MEQ/L
AST SERPL-CCNC: 23 UNIT/L (ref 11–45)
BILIRUB SERPL-MCNC: 0.4 MG/DL
BUN SERPL-MCNC: 11.5 MG/DL (ref 7–18.7)
CALCIUM SERPL-MCNC: 9.2 MG/DL (ref 8.4–10.2)
CHLORIDE SERPL-SCNC: 105 MMOL/L (ref 98–107)
CHOLEST SERPL-MCNC: 192 MG/DL
CHOLEST/HDLC SERPL: 4 {RATIO} (ref 0–5)
CO2 SERPL-SCNC: 26 MMOL/L (ref 22–29)
CREAT SERPL-MCNC: 0.78 MG/DL (ref 0.55–1.02)
CREAT UR-MCNC: 92.6 MG/DL (ref 45–106)
CREAT/UREA NIT SERPL: 15
EST. AVERAGE GLUCOSE BLD GHB EST-MCNC: 116.9 MG/DL
GFR SERPLBLD CREATININE-BSD FMLA CKD-EPI: >60 ML/MIN/1.73/M2
GLOBULIN SER-MCNC: 3.2 GM/DL (ref 2.4–3.5)
GLUCOSE SERPL-MCNC: 104 MG/DL (ref 74–100)
HBA1C MFR BLD: 5.7 %
HDLC SERPL-MCNC: 52 MG/DL (ref 35–60)
LDLC SERPL CALC-MCNC: 106 MG/DL (ref 50–140)
MICROALBUMIN UR-MCNC: <5 UG/ML
MICROALBUMIN/CREAT RATIO PNL UR: NORMAL
POTASSIUM SERPL-SCNC: 3.9 MMOL/L (ref 3.5–5.1)
PROT SERPL-MCNC: 7 GM/DL (ref 6.4–8.3)
SODIUM SERPL-SCNC: 140 MMOL/L (ref 136–145)
T3FREE SERPL-MCNC: 2.93 PG/ML (ref 1.58–3.91)
T4 FREE SERPL-MCNC: 0.84 NG/DL (ref 0.7–1.48)
TRIGL SERPL-MCNC: 171 MG/DL (ref 37–140)
TSH SERPL-ACNC: 2.52 UIU/ML (ref 0.35–4.94)
VLDLC SERPL CALC-MCNC: 34 MG/DL

## 2025-04-15 PROCEDURE — 84481 FREE ASSAY (FT-3): CPT

## 2025-04-15 PROCEDURE — 84443 ASSAY THYROID STIM HORMONE: CPT

## 2025-04-15 PROCEDURE — 83036 HEMOGLOBIN GLYCOSYLATED A1C: CPT

## 2025-04-15 PROCEDURE — 84439 ASSAY OF FREE THYROXINE: CPT

## 2025-04-15 PROCEDURE — 36415 COLL VENOUS BLD VENIPUNCTURE: CPT

## 2025-04-15 PROCEDURE — 80061 LIPID PANEL: CPT

## 2025-04-15 PROCEDURE — 80053 COMPREHEN METABOLIC PANEL: CPT

## 2025-04-15 PROCEDURE — 82043 UR ALBUMIN QUANTITATIVE: CPT

## 2025-05-01 ENCOUNTER — OFFICE VISIT (OUTPATIENT)
Facility: CLINIC | Age: 45
End: 2025-05-01
Payer: COMMERCIAL

## 2025-05-01 DIAGNOSIS — G47.19 EXCESSIVE DAYTIME SLEEPINESS: ICD-10-CM

## 2025-05-01 DIAGNOSIS — G47.33 OSA (OBSTRUCTIVE SLEEP APNEA): Primary | ICD-10-CM

## 2025-05-01 PROCEDURE — 1159F MED LIST DOCD IN RCRD: CPT | Mod: CPTII,95,, | Performed by: NURSE PRACTITIONER

## 2025-05-01 PROCEDURE — 1160F RVW MEDS BY RX/DR IN RCRD: CPT | Mod: CPTII,95,, | Performed by: NURSE PRACTITIONER

## 2025-05-01 PROCEDURE — 3061F NEG MICROALBUMINURIA REV: CPT | Mod: CPTII,95,, | Performed by: NURSE PRACTITIONER

## 2025-05-01 PROCEDURE — 98004 SYNCH AUDIO-VIDEO EST SF 10: CPT | Mod: 95,,, | Performed by: NURSE PRACTITIONER

## 2025-05-01 PROCEDURE — 4010F ACE/ARB THERAPY RXD/TAKEN: CPT | Mod: CPTII,95,, | Performed by: NURSE PRACTITIONER

## 2025-05-01 PROCEDURE — 3066F NEPHROPATHY DOC TX: CPT | Mod: CPTII,95,, | Performed by: NURSE PRACTITIONER

## 2025-05-01 PROCEDURE — 3044F HG A1C LEVEL LT 7.0%: CPT | Mod: CPTII,95,, | Performed by: NURSE PRACTITIONER

## 2025-05-01 RX ORDER — MODAFINIL 200 MG/1
200 TABLET ORAL DAILY
Qty: 30 TABLET | Refills: 5 | Status: SHIPPED | OUTPATIENT
Start: 2025-05-01

## 2025-05-01 NOTE — PROGRESS NOTES
Virtual Visit Note    Subjective:          Patient ID: Rob Dennison is a 45 y.o. female.    Chief Complaint: 8-10 week follow up for EUGENE and med refill    HPI:           The patient location is: vehicle  Visit type: audiovisual      Cont to feel PAP therapy is beneficial; feels refreshed when awakening; denies EDS  Denies issues with mask/machine    Feels better, less fatigued    PAP data:  date range 2025-2025  days used >=4hr 29/30  97%  AHI 7.5  Auto 6-14    Started Provigil 200 mg every morning - has been a big help; takes 0.5 tab every morning on weekends most of the time.     EPSS 15 prior to Modafinil, now 9   ON PROVIGIL     2025   EPWORTH SLEEPINESS SCALE   Sitting and reading 1   Watching TV 1   Sitting, inactive in a public place (e.g. a theatre or a meeting) 1   As a passenger in a car for an hour without a break 1   Lying down to rest in the afternoon when circumstances permit 2   Sitting and talking to someone 0   Sitting quietly after a lunch without alcohol 3   In a car, while stopped for a few minutes in traffic 0   Total score 9     This is a telemedicine note. After obtaining verbal consent/patient identification using name and , patient was treated using real time audio/video, according to [facility] protocols. ITristen NP [distant provider], conducted the visit from location identified below. The patient participated in the visit at a non-[facility] location selected by the patient (or patients representative), identified below. I am licensed in the state where the patient stated they are located. The patient (or patients representative) stated that they understood and accepted the privacy and security risks to their information at their location.    Distant provider was located at [facility]    Each patient to whom he or she provides medical services by telemedicine is:  (1) informed of the relationship between the physician and patient and the  respective role of any other health care provider with respect to management of the patient; and (2) notified that he or she may decline to receive medical services by telemedicine and may withdraw from such care at any time.            Past Medical History:   Diagnosis Date    Anemia     Oct. Nov of 2022    Anxiety     Breast cancer in female     right    Depression     Elevated troponin 11/4/2022    Hypertension     Menopause     Hot flashes began before chemo in July       Past Surgical History:   Procedure Laterality Date    ABDOMINAL SURGERY      Gall bladder removal 2021    AXILLARY NODE DISSECTION Right 01/30/2023    Procedure: LYMPHADENECTOMY, AXILLARY;  Surgeon: Genie Josue MD;  Location: [facility] OR;  Service: General;  Laterality: Right;    BACK SURGERY      BREAST SURGERY      Bilateral mastectomy    CHOLECYSTECTOMY      ENDOSCOPIC RELEASE OF BOTH CARPAL TUNNELS Bilateral     HYSTERECTOMY      SENTINEL LYMPH NODE BIOPSY Right 01/30/2023    Procedure: BIOPSY, LYMPH NODE, SENTINEL Right MagTrace Injected in office SentiMag needed No NUC MED;  Surgeon: Genie Josue MD;  Location: [facility] OR;  Service: General;  Laterality: Right;  MagTrace Injected in office  SentiMag needed  No NUC MED    SIMPLE MASTECTOMY Bilateral 01/30/2023    Procedure: MASTECTOMY, SIMPLE  Bilateral;  Surgeon: Genie Josue MD;  Location: [facility] OR;  Service: General;  Laterality: Bilateral;    SPINE SURGERY      Shaved herniated disc    TOTAL ABDOMINAL HYSTERECTOMY W/ BILATERAL SALPINGOOPHORECTOMY      TRIGGER FINGER RELEASE Right     WISDOM TOOTH EXTRACTION         Family History   Problem Relation Name Age of Onset    Hypertension Mother Honey     Hyperlipidemia Mother Honey     Arthritis Mother Honey     Cancer Mother Honey         Lymphoma    Miscarriages / Stillbirths Mother Honey     Hyperlipidemia Father Claudy     Stroke Father Claudy     Skin cancer Father Claudy     Cataracts Father Claudy     Alcohol abuse Father Claudy     Colon  cancer Paternal Grandfather         Social History     Socioeconomic History    Marital status:    Tobacco Use    Smoking status: Never    Smokeless tobacco: Never   Substance and Sexual Activity    Alcohol use: Yes     Comment: Occasionally    Drug use: Never    Sexual activity: Yes     Partners: Female     Birth control/protection: None     Social Drivers of Health     Financial Resource Strain: Low Risk  (5/1/2025)    Overall Financial Resource Strain (CARDIA)     Difficulty of Paying Living Expenses: Not very hard   Food Insecurity: No Food Insecurity (5/1/2025)    Hunger Vital Sign     Worried About Running Out of Food in the Last Year: Never true     Ran Out of Food in the Last Year: Never true   Transportation Needs: No Transportation Needs (5/1/2025)    PRAPARE - Transportation     Lack of Transportation (Medical): No     Lack of Transportation (Non-Medical): No   Physical Activity: Insufficiently Active (5/1/2025)    Exercise Vital Sign     Days of Exercise per Week: 3 days     Minutes of Exercise per Session: 20 min   Stress: Stress Concern Present (5/1/2025)    [employer] - Occupational Stress Questionnaire     Feeling of Stress : Very much   Housing Stability: Low Risk  (5/1/2025)    Housing Stability Vital Sign     Unable to Pay for Housing in the Last Year: No     Number of Times Moved in the Last Year: 0     Homeless in the Last Year: No   Recent Concern: Housing Stability - High Risk (4/14/2025)    Received from [facility]    Housing Stability Vital Sign     Unable to Pay for Housing in the Last Year: Yes     Number of Times Moved in the Last Year: 0     Homeless in the Last Year: No       Review of patient's allergies indicates:   Allergen Reactions    Fosaprepitant Other (See Comments)     Flushing, nausea, abdominal discomfort    Adhesive     Erythromycin base Other (See Comments) and Rash     Fever       Current Outpatient Medications   Medication  Instructions    acyclovir (ZOVIRAX) 400 mg, Oral, Daily    busPIRone (BUSPAR) 5 mg, Oral, 3 times daily PRN    DULoxetine (CYMBALTA) 120 mg, Oral, Daily    estradioL (VAGIFEM) 10 mcg Tab 1 tablet, Twice weekly    fluticasone propionate (FLONASE) 50 mcg/actuation nasal spray 1 spray, Daily    lisinopriL 10 mg, Oral, Daily    loratadine (CLARITIN) 10 mg, Daily    modafiniL (PROVIGIL) 200 mg, Oral, Daily    multivit-iron-FA-calcium-mins 9 mg iron-400 mcg Tab tablet 1 tablet, Daily    traZODone (DESYREL) 50 mg, Oral, Nightly PRN         Objective:      Physical Exam:  General- Patient alert and oriented x3 in NAD  Speech - nml  HEENT- EOMI  Resp- No increased WOB noted. Not using accessory muscles.  Skin-  No Jaundice. No visible skin lesions.        Assessment/Plan:     Problem List Items Addressed This Visit       EUGENE (obstructive sleep apnea) - Primary    Excessive daytime sleepiness    Patient is benefiting from PAP therapy; Encouraged continued use of PAP. Drowsy driving may still occur despite PAP use. Clinical follow up and replacement of supplies discussed.    AHI 7.5 - will increase max auto from 14 cm to 15 cm     Continue the Modafinil 200 mg every morning; refills generated     DME: [facility] FU in 1 year for EUGENE, 6 months for med refills           Tristen Kovacs, MSN, APRN, AGACNP-BC

## 2025-05-09 ENCOUNTER — TELEPHONE (OUTPATIENT)
Dept: INTERNAL MEDICINE | Facility: CLINIC | Age: 45
End: 2025-05-09
Payer: COMMERCIAL

## 2025-05-09 NOTE — TELEPHONE ENCOUNTER
No additional labs needed. She had CBC in sept 2024 and it was normal. Patient notified and verbalized understanding.

## 2025-05-09 NOTE — TELEPHONE ENCOUNTER
Copied from CRM #[id number]. Topic: General Inquiry - Patient Advice  >> May 9, 2025 10:31 AM Daisy wrote:  Who Called: Rob Dennison    Caller is requesting assistance/information from provider's office.    Symptoms (please be specific): n/a   How long has patient had these symptoms:  n/a  List of preferred pharmacies on file (remove unneeded): n/a      Preferred Method of Contact: Phone Call  Patient's Preferred Phone Number on File: [phone]   Best Call Back Number, if different:  Additional Information: Pt stated that she did blood work on 4/15/25. Pt got all blood work done except for CBC. Pt would like to know if she needs to redo these labs. Please advise.

## 2025-05-28 ENCOUNTER — LAB VISIT (OUTPATIENT)
Dept: LAB | Facility: HOSPITAL | Age: 45
End: 2025-05-28
Attending: FAMILY MEDICINE
Payer: COMMERCIAL

## 2025-05-28 ENCOUNTER — TELEPHONE (OUTPATIENT)
Dept: INTERNAL MEDICINE | Facility: CLINIC | Age: 45
End: 2025-05-28
Payer: COMMERCIAL

## 2025-05-28 DIAGNOSIS — L65.9 HAIR THINNING: ICD-10-CM

## 2025-05-28 DIAGNOSIS — I10 PRIMARY HYPERTENSION: Primary | ICD-10-CM

## 2025-05-28 DIAGNOSIS — R53.83 OTHER FATIGUE: ICD-10-CM

## 2025-05-28 DIAGNOSIS — Z15.01 GENETIC SUSCEPTIBILITY TO MALIGNANT NEOPLASM OF BREAST: ICD-10-CM

## 2025-05-28 DIAGNOSIS — Z15.02 GENETIC SUSCEPTIBILITY TO MALIGNANT NEOPLASM OF OVARY: ICD-10-CM

## 2025-05-28 DIAGNOSIS — E66.09 EXOGENOUS OBESITY: ICD-10-CM

## 2025-05-28 DIAGNOSIS — Z85.3 HX: BREAST CANCER: ICD-10-CM

## 2025-05-28 DIAGNOSIS — G47.33 OSA (OBSTRUCTIVE SLEEP APNEA): ICD-10-CM

## 2025-05-28 DIAGNOSIS — G47.00 INSOMNIA, UNSPECIFIED TYPE: ICD-10-CM

## 2025-05-28 DIAGNOSIS — R63.5 ABNORMAL WEIGHT GAIN: ICD-10-CM

## 2025-05-28 DIAGNOSIS — F41.9 ANXIETY: ICD-10-CM

## 2025-05-28 DIAGNOSIS — E11.65 INADEQUATELY CONTROLLED DIABETES MELLITUS: ICD-10-CM

## 2025-05-28 DIAGNOSIS — Z15.09 GENETIC SUSCEPTIBILITY TO OTHER MALIGNANT NEOPLASM: ICD-10-CM

## 2025-05-28 DIAGNOSIS — E66.811 OBESITY, CLASS I, BMI 30-34.9: ICD-10-CM

## 2025-05-28 DIAGNOSIS — K59.00 CONSTIPATION, UNSPECIFIED CONSTIPATION TYPE: ICD-10-CM

## 2025-05-28 LAB
25(OH)D3+25(OH)D2 SERPL-MCNC: 50 NG/ML (ref 30–80)
FERRITIN SERPL-MCNC: 43.72 NG/ML (ref 4.63–204)
FOLATE SERPL-MCNC: 18.8 NG/ML (ref 7–31.4)
IRON SATN MFR SERPL: 20 % (ref 20–50)
IRON SERPL-MCNC: 63 UG/DL (ref 50–170)
TIBC SERPL-MCNC: 254 UG/DL (ref 70–310)
TIBC SERPL-MCNC: 317 UG/DL (ref 250–450)
TRANSFERRIN SERPL-MCNC: 290 MG/DL (ref 180–382)
VIT B12 SERPL-MCNC: 923 PG/ML (ref 213–816)

## 2025-05-28 PROCEDURE — 36415 COLL VENOUS BLD VENIPUNCTURE: CPT

## 2025-05-28 PROCEDURE — 82746 ASSAY OF FOLIC ACID SERUM: CPT

## 2025-05-28 PROCEDURE — 83550 IRON BINDING TEST: CPT

## 2025-05-28 PROCEDURE — 82306 VITAMIN D 25 HYDROXY: CPT

## 2025-05-28 PROCEDURE — 82728 ASSAY OF FERRITIN: CPT

## 2025-05-28 PROCEDURE — 82607 VITAMIN B-12: CPT

## 2025-05-30 ENCOUNTER — OFFICE VISIT (OUTPATIENT)
Dept: INTERNAL MEDICINE | Facility: CLINIC | Age: 45
End: 2025-05-30
Payer: COMMERCIAL

## 2025-05-30 ENCOUNTER — TELEPHONE (OUTPATIENT)
Dept: INTERNAL MEDICINE | Facility: CLINIC | Age: 45
End: 2025-05-30
Payer: COMMERCIAL

## 2025-05-30 VITALS
OXYGEN SATURATION: 98 % | SYSTOLIC BLOOD PRESSURE: 118 MMHG | WEIGHT: 161 LBS | DIASTOLIC BLOOD PRESSURE: 64 MMHG | BODY MASS INDEX: 31.61 KG/M2 | HEIGHT: 60 IN | HEART RATE: 92 BPM

## 2025-05-30 DIAGNOSIS — I10 PRIMARY HYPERTENSION: Chronic | ICD-10-CM

## 2025-05-30 DIAGNOSIS — R73.09 ABNORMAL GLUCOSE MEASUREMENT: ICD-10-CM

## 2025-05-30 DIAGNOSIS — E66.09 CLASS 1 OBESITY DUE TO EXCESS CALORIES WITHOUT SERIOUS COMORBIDITY WITH BODY MASS INDEX (BMI) OF 31.0 TO 31.9 IN ADULT: Chronic | ICD-10-CM

## 2025-05-30 DIAGNOSIS — F32.A DEPRESSION, UNSPECIFIED DEPRESSION TYPE: Chronic | ICD-10-CM

## 2025-05-30 DIAGNOSIS — G47.33 OSA (OBSTRUCTIVE SLEEP APNEA): Chronic | ICD-10-CM

## 2025-05-30 DIAGNOSIS — J30.89 ENVIRONMENTAL AND SEASONAL ALLERGIES: Chronic | ICD-10-CM

## 2025-05-30 DIAGNOSIS — F41.9 ANXIETY: Chronic | ICD-10-CM

## 2025-05-30 DIAGNOSIS — E66.811 CLASS 1 OBESITY DUE TO EXCESS CALORIES WITHOUT SERIOUS COMORBIDITY WITH BODY MASS INDEX (BMI) OF 31.0 TO 31.9 IN ADULT: Chronic | ICD-10-CM

## 2025-05-30 DIAGNOSIS — M65.332 TRIGGER MIDDLE FINGER OF LEFT HAND: ICD-10-CM

## 2025-05-30 DIAGNOSIS — G47.00 INSOMNIA, UNSPECIFIED TYPE: Chronic | ICD-10-CM

## 2025-05-30 DIAGNOSIS — Z00.00 WELLNESS EXAMINATION: Primary | ICD-10-CM

## 2025-05-30 DIAGNOSIS — Z79.890 ON HORMONE REPLACEMENT THERAPY: Chronic | ICD-10-CM

## 2025-05-30 DIAGNOSIS — B00.9 HSV-2 INFECTION: Chronic | ICD-10-CM

## 2025-05-30 DIAGNOSIS — C50.411 MALIGNANT NEOPLASM OF UPPER-OUTER QUADRANT OF RIGHT BREAST IN FEMALE, ESTROGEN RECEPTOR NEGATIVE: Chronic | ICD-10-CM

## 2025-05-30 DIAGNOSIS — Z17.1 MALIGNANT NEOPLASM OF UPPER-OUTER QUADRANT OF RIGHT BREAST IN FEMALE, ESTROGEN RECEPTOR NEGATIVE: Chronic | ICD-10-CM

## 2025-05-30 PROBLEM — E11.9 TYPE 2 DIABETES MELLITUS WITHOUT COMPLICATION, WITHOUT LONG-TERM CURRENT USE OF INSULIN: Status: ACTIVE | Noted: 2025-05-30

## 2025-05-30 PROBLEM — E11.9 TYPE 2 DIABETES MELLITUS WITHOUT COMPLICATION, WITHOUT LONG-TERM CURRENT USE OF INSULIN: Status: RESOLVED | Noted: 2025-05-30 | Resolved: 2025-05-30

## 2025-05-30 RX ORDER — METFORMIN HYDROCHLORIDE 500 MG/1
500 TABLET, EXTENDED RELEASE ORAL NIGHTLY
COMMUNITY
Start: 2025-04-14 | End: 2026-04-14

## 2025-05-30 NOTE — ASSESSMENT & PLAN NOTE
-requesting we take over on management  -per patient seen by external provider due to serum glucose 219 (03/06/2025) [Patient believes labs were fasting]  -appears new patient visit with external MD 04/14/2025 and diagnosed with DM 2 for elevated glucose and polydipsia  -HGB A1c 5.7 on 04/15/25, the following day, leaning towards insulin resistance/prediabetes  -see separate note on collaborative discussion with PCP   -currently on metformin XR 1000 mg nightly (initiated 04/14/25); decrease to 500 mg nightly     Since patient wishing for us to take over on care, we will manage according to our diagnostics/workup.  Orders Placed This Encounter    Glucose Tolerance, 3 Hours    Comprehensive Metabolic Panel    Lipid Panel    Hemoglobin A1C    Insulin, Random

## 2025-05-30 NOTE — PROGRESS NOTES
Patient ID: Rob Dennison is a 45 y.o. female.    Chief Complaint: Annual Exam (Annual Wellness- Patient wants provider to take over new diagnosis of Diabetes TX. )    Rob Dennison is a 45 y.o. female, known to Dr Waller, presents today for a wellness visit.  Medical comorbidities include HTN, anxiety/depression, daytime somnolence, insomnia    History of Present Illness    Patient presents today for yearly physical exam.  Since last visit patient reports he has been fairly well without major injury or illness.  Did have recent orthopedic urgent care presentation for left middle finger pain.  Subsequently had steroid injection to trigger finger, recommended orthopedic follow up for persistence .  Per patient, recently diagnosed with DM 2 this past April by external provider.  Noted serum glucose 219 (03/06/25) with reported symptom of polydipsia.  Reports believes she obtained labs fasting.  Started on metformin XR 1000 mg q.h.s. however, noted HGB A1c 5.7 4/15/25, the following day. Patient requesting we take over on diabetes management, however discussed validating diagnosis diabetes vs prediabetes and discussing further with PCP.  Does have history of hypertension currently doing well on lisinopril 10 mg.  Followed by Neurology as well on modafinil for daytime sleepiness and trazodone for insomnia.  Age-appropriate screenings reviewed and up-to-date.  Of note, patient is status post bilateral mastectomy and hysterectomy.       Wellness:  05/30/2025  MEDICAL HISTORY:    Past Medical History:   Diagnosis Date    Anemia     Oct. Nov of 2022    Anxiety     Breast cancer in female     right    Depression     Elevated troponin 11/4/2022    Hypertension     Menopause     Hot flashes began before chemo in July      Past Surgical History:   Procedure Laterality Date    ABDOMINAL SURGERY      Gall bladder removal 2021    AXILLARY NODE DISSECTION Right 01/30/2023    Procedure: LYMPHADENECTOMY, AXILLARY;  Surgeon: Genie HOLDEN  MD Liat;  Location: [facility] OR;  Service: General;  Laterality: Right;    BACK SURGERY      BREAST SURGERY      Bilateral mastectomy    CHOLECYSTECTOMY      ENDOSCOPIC RELEASE OF BOTH CARPAL TUNNELS Bilateral     HYSTERECTOMY      SENTINEL LYMPH NODE BIOPSY Right 01/30/2023    Procedure: BIOPSY, LYMPH NODE, SENTINEL Right MagTrace Injected in office SentiMag needed No NUC MED;  Surgeon: Genie Josue MD;  Location: [facility] OR;  Service: General;  Laterality: Right;  MagTrace Injected in office  SentiMag needed  No NUC MED    SIMPLE MASTECTOMY Bilateral 01/30/2023    Procedure: MASTECTOMY, SIMPLE  Bilateral;  Surgeon: Genie Josue MD;  Location: [facility] OR;  Service: General;  Laterality: Bilateral;    SPINE SURGERY      Shaved herniated disc    TOTAL ABDOMINAL HYSTERECTOMY W/ BILATERAL SALPINGOOPHORECTOMY      TRIGGER FINGER RELEASE Right     WISDOM TOOTH EXTRACTION        Social History[1]       Health Maintenance Due   Topic Date Due    COVID-19 Vaccine (3 - Pfizer risk series) 06/01/2021          Patient Care Team:  Richa Waller MD as PCP - General (Internal Medicine)  Shala Levin MD (Cardiology)  Aliya Winslow MD as Consulting Physician (Obstetrics and Gynecology)  Marcy Del Valle MD (Internal Medicine)  Shavon Rizzo PA as Physician Assistant (General Surgery)      Review of Systems   Constitutional:  Negative for fatigue and fever.   HENT:  Negative for congestion, rhinorrhea, sore throat and trouble swallowing.    Eyes:  Negative for redness and visual disturbance.   Respiratory:  Negative for cough, chest tightness and shortness of breath.    Cardiovascular:  Negative for chest pain and palpitations.   Gastrointestinal:  Negative for abdominal pain, constipation, diarrhea, nausea and vomiting.   Genitourinary:  Negative for dysuria, flank pain, frequency and urgency.   Musculoskeletal:  Negative for arthralgias, gait problem and myalgias.   Skin:  Negative for rash and wound.    Neurological:  Negative for facial asymmetry, speech difficulty, weakness and headaches.   All other systems reviewed and are negative.      Objective:   /64 (BP Location: Left arm, Patient Position: Sitting)   Pulse 92   Ht 5' (1.524 m)   Wt 73 kg (161 lb)   LMP 05/31/2022   SpO2 98%   BMI 31.44 kg/m²      Physical Exam  Constitutional:       General: She is not in acute distress.     Appearance: Normal appearance. She is obese.   HENT:      Right Ear: Tympanic membrane, ear canal and external ear normal.      Left Ear: Tympanic membrane, ear canal and external ear normal.      Nose: Nose normal.      Mouth/Throat:      Mouth: Mucous membranes are moist.      Pharynx: Oropharynx is clear.   Eyes:      Extraocular Movements: Extraocular movements intact.      Conjunctiva/sclera: Conjunctivae normal.      Pupils: Pupils are equal, round, and reactive to light.   Cardiovascular:      Rate and Rhythm: Normal rate and regular rhythm.      Pulses: Normal pulses.      Heart sounds: Normal heart sounds. No murmur heard.     No gallop.   Pulmonary:      Effort: Pulmonary effort is normal.      Breath sounds: Normal breath sounds. No wheezing.   Abdominal:      General: Bowel sounds are normal. There is no distension.      Palpations: Abdomen is soft. There is no mass.      Tenderness: There is no abdominal tenderness. There is no guarding.   Musculoskeletal:         General: Normal range of motion.   Skin:     General: Skin is warm and dry.   Neurological:      Mental Status: She is alert. Mental status is at baseline.      Sensory: No sensory deficit.      Motor: No weakness.           Assessment:       ICD-10-CM ICD-9-CM   1. Wellness examination  Z00.00 V70.0   2. Abnormal glucose measurement  R73.09 790.29   3. Class 1 obesity due to excess calories without serious comorbidity with body mass index (BMI) of 31.0 to 31.9 in adult  E66.811 278.00    E66.09 V85.31    Z68.31    4. Primary hypertension  I10  401.9   5. Depression, unspecified depression type  F32.A 311   6. Anxiety  F41.9 300.00   7. HSV-2 infection  B00.9 054.9   8. Malignant neoplasm of upper-outer quadrant of right breast in female, estrogen receptor negative  C50.411 174.4    Z17.1 V86.1   9. EUGENE (obstructive sleep apnea)  G47.33 327.23   10. Insomnia, unspecified type  G47.00 780.52   11. Environmental and seasonal allergies  J30.89 477.8   12. On hormone replacement therapy  Z79.890 V07.4   13. Trigger middle finger of left hand  M65.332 727.03        Plan:   1. Wellness examination  Assessment & Plan:  -patient feeling generally well today  -most recent wellness labs reviewed and generally stable  -age-appropriate screenings reviewed and discussed  -immunizations reviewed and discussed  -encourage routine aerobic exercise 2 to 3 times a week  -increase fluid hydration        2. Abnormal glucose measurement  Assessment & Plan:  -requesting we take over on management  -per patient seen by external provider due to serum glucose 219 (03/06/2025) [Patient believes labs were fasting]  -appears new patient visit with external MD 04/14/2025 and diagnosed with DM 2 for elevated glucose and polydipsia  -HGB A1c 5.7 on 04/15/25, the following day, leaning towards insulin resistance/prediabetes  -see separate note on collaborative discussion with PCP   -currently on metformin XR 1000 mg nightly (initiated 04/14/25); decrease to 500 mg nightly     Since patient wishing for us to take over on care, we will manage according to our diagnostics/workup.  Orders Placed This Encounter    Glucose Tolerance, 3 Hours    Comprehensive Metabolic Panel    Lipid Panel    Hemoglobin A1C    Insulin, Random       Orders:  -     Glucose Tolerance, 3 Hours; Future; Expected date: 06/16/2025  -     Comprehensive Metabolic Panel; Future; Expected date: 06/16/2025  -     Lipid Panel; Future; Expected date: 06/16/2025  -     Hemoglobin A1C; Future; Expected date: 06/16/2025  -      Insulin, Random; Future; Expected date: 05/30/2025    3. Class 1 obesity due to excess calories without serious comorbidity with body mass index (BMI) of 31.0 to 31.9 in adult  Assessment & Plan:  Estimated body mass index is 34.76 kg/m² as calculated from the following:    Height as of 2/11/25: 5' (1.524 m).    Weight as of 2/11/25: 80.7 kg (178 lb).    - Encourage exorcise for 30-45 min a day, 5x a week   - Log foods and track calories    - Eat lean meats (chicken, turkey, fish, lean ground beef)   - Avoid fried, fatty or processed foods.   - Avoid excessive carbohydrate rich foods (pasta, bread, rice, potatoes)      Orders:  -     Glucose Tolerance, 3 Hours; Future; Expected date: 06/16/2025  -     Comprehensive Metabolic Panel; Future; Expected date: 06/16/2025  -     Lipid Panel; Future; Expected date: 06/16/2025  -     Hemoglobin A1C; Future; Expected date: 06/16/2025  -     Insulin, Random; Future; Expected date: 05/30/2025    4. Primary hypertension  Assessment & Plan:  -stable   -currently on lisinopril 10 mg, continue   -low-sodium diet      5. Depression, unspecified depression type  Assessment & Plan:  -stable   -currently on Cymbalta 120 mg q.d. and BuSpar 5 mg t.i.d. p.r.n., continue      6. Anxiety  Assessment & Plan:  -stable   -currently on Cymbalta 120 mg q.d. and BuSpar 5 mg t.i.d. p.r.n., continue      7. HSV-2 infection  Assessment & Plan:  -stable   -on chronic suppression with acyclovir, continue      8. Malignant neoplasm of upper-outer quadrant of right breast in female, estrogen receptor negative  Overview:  Last Assessment & Plan:   Formatting of this note might be different from the original.  · History & Physical Had first dose of chemotherapy on 1/8/2022 and it is mentioned in onc notes that she was to receive Neulasta though I do not see that it was actually administered.  Consider discussing case with heme-onc.    Discharge Summary  Patient with known history of stage IIIb triple  negative breast cancer status postchemotherapy on 7/8/2022, complicated by febrile neutropenia.  She has close outpatient follow-up scheduled with Dr. Del Valle this week.     Follow-up Patient with known known history of stage IIb triple negative breast cancer status postchemotherapy 7/8/2022, complicated by neutropenia..  Will need close outpatient follow-up with clinic with Dr. Del Valle    Assessment & Plan:  -s/p bilateral mastectomy   - followed by Dr. Del Valle in [place].      9. EUGENE (obstructive sleep apnea)  Assessment & Plan:  -utilizes CPAP, continue   -encourage device hygiene  -encourage device compliance, Life time use expected  -Avoid excessive alcohol, smoking, and overuse of sedatives at bedtime  -Goal BMI <30.       10. Insomnia, unspecified type  Assessment & Plan:  -stable   -Currently on trazodone 50 mg nightly, continue      11. Environmental and seasonal allergies  Assessment & Plan:  -stable   -currently on loratadine and fluticasone nasal, continue   -avoid triggers      12. On hormone replacement therapy  Assessment & Plan:  -established with gyn   -currently on estradiol 10 mcg twice weekly      13. Trigger middle finger of left hand  Assessment & Plan:  -recent orthopedic urgent care visit   -steroid injection at trigger finger   -recommended follow up with her LOS orthopedist if symptoms persist or worsen            Follow up in about 6 months (around 11/30/2025) for Blood Pressure, General Checkup.   -plan specifics discussed above    Orders Placed This Encounter    Glucose Tolerance, 3 Hours    Comprehensive Metabolic Panel    Lipid Panel    Hemoglobin A1C    Insulin, Random        Medication List with Changes/Refills   Current Medications    ACYCLOVIR (ZOVIRAX) 400 MG TABLET    Take 1 tablet (400 mg total) by mouth once daily.    BUSPIRONE (BUSPAR) 5 MG TAB    Take 1 tablet (5 mg total) by mouth 3 (three) times daily as needed (anxiety).    DULOXETINE (CYMBALTA) 60 MG CAPSULE    Take 2  capsules (120 mg total) by mouth once daily.    ESTRADIOL (VAGIFEM) 10 MCG TAB    Place 1 tablet vaginally twice a week.    FLUTICASONE PROPIONATE (FLONASE) 50 MCG/ACTUATION NASAL SPRAY    1 spray by Each Nostril route once daily.    LISINOPRIL 10 MG TABLET    Take 1 tablet (10 mg total) by mouth once daily.    LORATADINE (CLARITIN) 10 MG TABLET    Take 10 mg by mouth once daily.    METFORMIN (GLUCOPHAGE-XR) 500 MG ER 24HR TABLET    Take 500 mg by mouth every evening.    MODAFINIL (PROVIGIL) 200 MG TAB    Take 1 tablet (200 mg total) by mouth once daily.    MULTIVIT-IRON-FA-CALCIUM-MINS 9 MG IRON-400 MCG TAB TABLET    Take 1 tablet by mouth once daily.    TRAZODONE (DESYREL) 50 MG TABLET    Take 1 tablet (50 mg total) by mouth nightly as needed for Insomnia.      This note was generated with the assistance of ambient listening technology. I attest to having reviewed and edited the generated note for accuracy, though some syntax or spelling errors may persist. Please contact the author of this note for any clarification.          [1]   Social History  Tobacco Use    Smoking status: Never    Smokeless tobacco: Never   Substance Use Topics    Alcohol use: Yes     Comment: Occasionally    Drug use: Never

## 2025-05-30 NOTE — Clinical Note
Patient may need to have glucose tolerance test scheduled?  Can we reach out to lab and see if that is the case

## 2025-05-30 NOTE — PROGRESS NOTES
Discussed case with Dr. Waller.    Since patient initially started metformin XR 1000 mg on 4/14/25 and the following day 4/15/25 had an HGB A1c of 5.7; we will reduce her metformin XR from a 1000 mg to 500 mg.  And place for a glucose tolerance test in addition to others for further delineation.      Since patient wishing for us to take over on care, we will manage according to our diagnostics/workup.    Orders Placed This Encounter    Glucose Tolerance, 3 Hours    Comprehensive Metabolic Panel    Lipid Panel    Hemoglobin A1C    Insulin, Random

## 2025-05-30 NOTE — ASSESSMENT & PLAN NOTE
-utilizes CPAP, continue   -encourage device hygiene  -encourage device compliance, Life time use expected  -Avoid excessive alcohol, smoking, and overuse of sedatives at bedtime  -Goal BMI <30.

## 2025-05-30 NOTE — TELEPHONE ENCOUNTER
----- Message from BIJAL Javed sent at 5/30/2025 10:08 AM CDT -----  Patient may need to have glucose tolerance test scheduled?  Can we reach out to lab and see if that is the case   Ilumya Counseling: I discussed with the patient the risks of tildrakizumab including but not limited to immunosuppression, malignancy, posterior leukoencephalopathy syndrome, and serious infections.  The patient understands that monitoring is required including a PPD at baseline and must alert us or the primary physician if symptoms of infection or other concerning signs are noted.

## 2025-05-30 NOTE — TELEPHONE ENCOUNTER
Spoke to Chelsey at the lab, patient does not need to make an appt but can if they want. Patient advised at this time and also made aware to fast before going. Patient verbalized understanding

## 2025-05-30 NOTE — ASSESSMENT & PLAN NOTE
-recent orthopedic urgent care visit   -steroid injection at trigger finger   -recommended follow up with her [place] orthopedist if symptoms persist or worsen

## 2025-05-30 NOTE — ASSESSMENT & PLAN NOTE
Estimated body mass index is 34.76 kg/m² as calculated from the following:    Height as of 2/11/25: 5' (1.524 m).    Weight as of 2/11/25: 80.7 kg (178 lb).    - Encourage exorcise for 30-45 min a day, 5x a week   - Log foods and track calories    - Eat lean meats (chicken, turkey, fish, lean ground beef)   - Avoid fried, fatty or processed foods.   - Avoid excessive carbohydrate rich foods (pasta, bread, rice, potatoes)

## 2025-06-05 ENCOUNTER — PATIENT MESSAGE (OUTPATIENT)
Dept: INTERNAL MEDICINE | Facility: CLINIC | Age: 45
End: 2025-06-05
Payer: COMMERCIAL

## 2025-06-16 DIAGNOSIS — F32.A DEPRESSION, UNSPECIFIED DEPRESSION TYPE: Primary | ICD-10-CM

## 2025-06-16 RX ORDER — BUSPIRONE HYDROCHLORIDE 5 MG/1
TABLET ORAL
Qty: 60 TABLET | Refills: 11 | Status: SHIPPED | OUTPATIENT
Start: 2025-06-16

## 2025-06-30 ENCOUNTER — PATIENT OUTREACH (OUTPATIENT)
Facility: CLINIC | Age: 45
End: 2025-06-30
Payer: COMMERCIAL

## 2025-06-30 LAB
LEFT EYE DM RETINOPATHY: NEGATIVE
RIGHT EYE DM RETINOPATHY: NEGATIVE

## 2025-06-30 NOTE — PROGRESS NOTES
Health Maintenance Topic(s) Outreach Outcomes & Actions Taken:    Eye Exam - Outreach Outcomes & Actions Taken  : Diabetic Eye External Records Uploaded, Care Team & History Updated if Applicable       Additional Notes:  Upload Diabetic Eye Exam: 6/30/2025 Dr Larry Pablo

## 2025-07-07 ENCOUNTER — TELEPHONE (OUTPATIENT)
Dept: INTERNAL MEDICINE | Facility: CLINIC | Age: 45
End: 2025-07-07
Payer: COMMERCIAL

## 2025-07-07 NOTE — TELEPHONE ENCOUNTER
Copied from CRM #[id number]. Topic: General Inquiry - Patient Advice  >> Jul 7, 2025  1:53 PM Alena wrote:  .Who Called: Rob Dennison    Patient is returning phone call    Who Left Message for Patient:  Does the patient know what this is regarding?:Pt currently at office. stated she needs her lab orders faxed to [phone] [facility] (Dr gunn office)      Preferred Method of Contact: Phone Call  Patient's Preferred Phone Number on File: [phone]   Best Call Back Number, if different:  Additional Information:

## 2025-07-07 NOTE — TELEPHONE ENCOUNTER
Copied from CRM #[id number]. Topic: General Inquiry - Patient Advice  >> Jul 7, 2025  7:05 AM Rodolfo wrote:  Who Called: Rob Dennison    Caller is requesting assistance/information from provider's office.    Symptoms (please be specific):    How long has patient had these symptoms:    List of preferred pharmacies on file (remove unneeded): [unfilled]  If different, enter pharmacy into here including location and phone number:         Patient's Preferred Phone Number on File: [phone]   Best Call Back Number, if different:  Additional Information: pt called to req labs are faxed to [facility] phone# [phone].

## 2025-07-18 ENCOUNTER — LAB VISIT (OUTPATIENT)
Dept: LAB | Facility: HOSPITAL | Age: 45
End: 2025-07-18
Payer: COMMERCIAL

## 2025-07-18 DIAGNOSIS — R73.09 ABNORMAL GLUCOSE MEASUREMENT: ICD-10-CM

## 2025-07-18 DIAGNOSIS — E66.09 CLASS 1 OBESITY DUE TO EXCESS CALORIES WITHOUT SERIOUS COMORBIDITY WITH BODY MASS INDEX (BMI) OF 31.0 TO 31.9 IN ADULT: ICD-10-CM

## 2025-07-18 DIAGNOSIS — E66.811 CLASS 1 OBESITY DUE TO EXCESS CALORIES WITHOUT SERIOUS COMORBIDITY WITH BODY MASS INDEX (BMI) OF 31.0 TO 31.9 IN ADULT: ICD-10-CM

## 2025-07-18 LAB
ALBUMIN SERPL-MCNC: 3.8 G/DL (ref 3.5–5)
ALBUMIN/GLOB SERPL: 1.2 RATIO (ref 1.1–2)
ALP SERPL-CCNC: 80 UNIT/L (ref 40–150)
ALT SERPL-CCNC: 25 UNIT/L (ref 0–55)
ANION GAP SERPL CALC-SCNC: 8 MEQ/L
AST SERPL-CCNC: 23 UNIT/L (ref 11–45)
BILIRUB SERPL-MCNC: 0.6 MG/DL
BUN SERPL-MCNC: 10.3 MG/DL (ref 7–18.7)
CALCIUM SERPL-MCNC: 9.4 MG/DL (ref 8.4–10.2)
CHLORIDE SERPL-SCNC: 105 MMOL/L (ref 98–107)
CHOLEST SERPL-MCNC: 157 MG/DL
CHOLEST/HDLC SERPL: 3 {RATIO} (ref 0–5)
CO2 SERPL-SCNC: 28 MMOL/L (ref 22–29)
CREAT SERPL-MCNC: 0.82 MG/DL (ref 0.55–1.02)
CREAT/UREA NIT SERPL: 13
EST. AVERAGE GLUCOSE BLD GHB EST-MCNC: 108.3 MG/DL
GFR SERPLBLD CREATININE-BSD FMLA CKD-EPI: >60 ML/MIN/1.73/M2
GLOBULIN SER-MCNC: 3.3 GM/DL (ref 2.4–3.5)
GLUCOSE 1H P 100 G GLC PO SERPL-MCNC: 175 MG/DL (ref 100–180)
GLUCOSE 2H P 100 G GLC PO SERPL-MCNC: 173 MG/DL (ref 70–140)
GLUCOSE 3H P 100 G GLC PO SERPL-MCNC: 211 MG/DL (ref 70–115)
GLUCOSE P FAST SERPL-MCNC: 104 MG/DL (ref 70–100)
GLUCOSE SERPL-MCNC: 104 MG/DL (ref 74–100)
HBA1C MFR BLD: 5.4 %
HDLC SERPL-MCNC: 57 MG/DL (ref 35–60)
INSULIN SERPL-MCNC: 6.5 UU/ML
LDLC SERPL CALC-MCNC: 74 MG/DL (ref 50–140)
PATH REV: NORMAL
POCT GLUCOSE: 110 MG/DL (ref 70–110)
POCT GLUCOSE: 178 MG/DL (ref 70–110)
POTASSIUM SERPL-SCNC: 4.2 MMOL/L (ref 3.5–5.1)
PROT SERPL-MCNC: 7.1 GM/DL (ref 6.4–8.3)
SODIUM SERPL-SCNC: 141 MMOL/L (ref 136–145)
TRIGL SERPL-MCNC: 131 MG/DL (ref 37–140)
VLDLC SERPL CALC-MCNC: 26 MG/DL

## 2025-07-18 PROCEDURE — 83525 ASSAY OF INSULIN: CPT

## 2025-07-18 PROCEDURE — 82952 GTT-ADDED SAMPLES: CPT

## 2025-07-18 PROCEDURE — 82951 GLUCOSE TOLERANCE TEST (GTT): CPT

## 2025-07-18 PROCEDURE — 36415 COLL VENOUS BLD VENIPUNCTURE: CPT

## 2025-07-18 PROCEDURE — 82947 ASSAY GLUCOSE BLOOD QUANT: CPT

## 2025-07-18 PROCEDURE — 80053 COMPREHEN METABOLIC PANEL: CPT

## 2025-07-18 PROCEDURE — 82950 GLUCOSE TEST: CPT

## 2025-07-18 PROCEDURE — 83036 HEMOGLOBIN GLYCOSYLATED A1C: CPT

## 2025-07-18 PROCEDURE — 80061 LIPID PANEL: CPT

## 2025-08-05 ENCOUNTER — PATIENT MESSAGE (OUTPATIENT)
Dept: INTERNAL MEDICINE | Facility: CLINIC | Age: 45
End: 2025-08-05
Payer: COMMERCIAL

## 2025-08-16 DIAGNOSIS — B00.9 HSV-2 INFECTION: ICD-10-CM

## 2025-08-18 DIAGNOSIS — F32.A DEPRESSION, UNSPECIFIED DEPRESSION TYPE: ICD-10-CM

## 2025-08-18 RX ORDER — DULOXETIN HYDROCHLORIDE 60 MG/1
120 CAPSULE, DELAYED RELEASE ORAL DAILY
Qty: 180 CAPSULE | Refills: 3 | Status: SHIPPED | OUTPATIENT
Start: 2025-08-18

## 2025-08-18 RX ORDER — ACYCLOVIR 400 MG/1
400 TABLET ORAL DAILY
Qty: 90 TABLET | Refills: 3 | Status: SHIPPED | OUTPATIENT
Start: 2025-08-18

## (undated) DEVICE — SUT MCRYL PLUS 3-0 PS2 27IN

## (undated) DEVICE — GLOVE PROTEXIS BLUE LATEX 7

## (undated) DEVICE — DRESSING TRANS 4X4 TEGADERM

## (undated) DEVICE — BLADE SURG STAINLESS STEEL #15

## (undated) DEVICE — GLOVE PROTEXIS LTX MICRO 6.5

## (undated) DEVICE — RESERVOIR JACKSON-PRATT 100CC

## (undated) DEVICE — ELECTRODE PATIENT RETURN DISP

## (undated) DEVICE — SOL IRRI STRL WATER 1000ML

## (undated) DEVICE — PAD ABD 8X10 STERILE

## (undated) DEVICE — STAPLER SKIN PROXIMATE WIDE

## (undated) DEVICE — GLOVE 6.0 PROTEXIS PI MICRO

## (undated) DEVICE — GLOVE PROTEXIS PI SYN SURG 6.0

## (undated) DEVICE — NDL HYPO REG 25G X 1 1/2

## (undated) DEVICE — NDL SYR 10ML 18X1.5 LL BLUNT

## (undated) DEVICE — GLOVE PROTEXIS PI SYN SURG 6.5

## (undated) DEVICE — ADHESIVE DERMABOND ADVANCED

## (undated) DEVICE — SUT SILK 3-0 BLK BR SH 30IN

## (undated) DEVICE — SUPPORT ULNA NERVE PROTECTOR

## (undated) DEVICE — DRAPE FLUID WARMER ORS 44X44IN

## (undated) DEVICE — SPONGE LAP STRL 18X18IN

## (undated) DEVICE — SUT 3-0 ETHILON 18 FS-1

## (undated) DEVICE — APPLICATOR CHLORAPREP ORN 26ML

## (undated) DEVICE — Device

## (undated) DEVICE — BAG MEDI-PLAST DECANTER C-FLOW

## (undated) DEVICE — COVER C-ARM STRAP BAND 44X80IN

## (undated) DEVICE — GOWN X-LG STERILE BACK

## (undated) DEVICE — SUT STRATAFIX 4-0 30CM PS-2

## (undated) DEVICE — ELECTRODE BLADE INSULATED 1 IN

## (undated) DEVICE — DRAIN SURG HUBLESS 30CM 15FR

## (undated) DEVICE — CLOSURE SKIN STERI STRIP 1/2X4

## (undated) DEVICE — SOL NACL .9P 500ML

## (undated) DEVICE — APPLIER CLIP LIAGCLIP 9.375IN

## (undated) DEVICE — SPONGE KERLIX ANTIMIC 6X6.75IN

## (undated) DEVICE — DRAPE FULL SHEET 70X100IN

## (undated) DEVICE — BLANKET SNUGGLE WARM LOWER BDY

## (undated) DEVICE — MARENA BRA

## (undated) DEVICE — ILLUMINATOR PHOTONBLADE 8/11IN

## (undated) DEVICE — BLADE SURG STAINLESS STEEL #11